# Patient Record
Sex: MALE | Race: WHITE | Employment: OTHER | URBAN - METROPOLITAN AREA
[De-identification: names, ages, dates, MRNs, and addresses within clinical notes are randomized per-mention and may not be internally consistent; named-entity substitution may affect disease eponyms.]

---

## 2017-03-14 DIAGNOSIS — E88.81 METABOLIC SYNDROME X: ICD-10-CM

## 2017-03-14 DIAGNOSIS — I10 ESSENTIAL HYPERTENSION: ICD-10-CM

## 2017-03-14 DIAGNOSIS — E78.5 HYPERLIPIDEMIA WITH TARGET LDL LESS THAN 100: ICD-10-CM

## 2017-03-14 RX ORDER — ATENOLOL 50 MG/1
50 TABLET ORAL DAILY
Qty: 90 TABLET | Refills: 1 | Status: SHIPPED | OUTPATIENT
Start: 2017-03-14 | End: 2017-09-11 | Stop reason: SDUPTHER

## 2017-03-14 RX ORDER — BENAZEPRIL HYDROCHLORIDE 40 MG/1
TABLET, FILM COATED ORAL
Qty: 90 TABLET | Refills: 0 | Status: SHIPPED | OUTPATIENT
Start: 2017-03-14 | End: 2017-06-21 | Stop reason: SDUPTHER

## 2017-03-14 RX ORDER — PRAVASTATIN SODIUM 20 MG
20 TABLET ORAL DAILY
Qty: 90 TABLET | Refills: 1 | Status: SHIPPED | OUTPATIENT
Start: 2017-03-14 | End: 2018-02-19 | Stop reason: SDUPTHER

## 2017-03-17 ENCOUNTER — OFFICE VISIT (OUTPATIENT)
Dept: FAMILY MEDICINE CLINIC | Age: 57
End: 2017-03-17

## 2017-03-17 VITALS
OXYGEN SATURATION: 96 % | DIASTOLIC BLOOD PRESSURE: 76 MMHG | RESPIRATION RATE: 18 BRPM | SYSTOLIC BLOOD PRESSURE: 118 MMHG | HEIGHT: 69 IN | WEIGHT: 315 LBS | TEMPERATURE: 98.5 F | HEART RATE: 68 BPM | BODY MASS INDEX: 46.65 KG/M2

## 2017-03-17 DIAGNOSIS — Z11.4 SCREENING FOR HIV (HUMAN IMMUNODEFICIENCY VIRUS): ICD-10-CM

## 2017-03-17 DIAGNOSIS — E78.5 HYPERLIPIDEMIA WITH TARGET LDL LESS THAN 100: ICD-10-CM

## 2017-03-17 DIAGNOSIS — E88.81 METABOLIC SYNDROME X: ICD-10-CM

## 2017-03-17 DIAGNOSIS — Z23 NEED FOR PNEUMOCOCCAL VACCINATION: ICD-10-CM

## 2017-03-17 DIAGNOSIS — Z11.59 NEED FOR HEPATITIS C SCREENING TEST: ICD-10-CM

## 2017-03-17 DIAGNOSIS — I10 ESSENTIAL HYPERTENSION: ICD-10-CM

## 2017-03-17 DIAGNOSIS — R45.86 MOOD SWINGS: ICD-10-CM

## 2017-03-17 DIAGNOSIS — Z12.5 SPECIAL SCREENING FOR MALIGNANT NEOPLASM OF PROSTATE: ICD-10-CM

## 2017-03-17 DIAGNOSIS — Z12.11 SPECIAL SCREENING FOR MALIGNANT NEOPLASMS, COLON: ICD-10-CM

## 2017-03-17 DIAGNOSIS — I10 ESSENTIAL HYPERTENSION: Primary | ICD-10-CM

## 2017-03-17 LAB
A/G RATIO: 1.6 (ref 1.1–2.2)
ALBUMIN SERPL-MCNC: 4.1 G/DL (ref 3.4–5)
ALP BLD-CCNC: 56 U/L (ref 40–129)
ALT SERPL-CCNC: 28 U/L (ref 10–40)
ANION GAP SERPL CALCULATED.3IONS-SCNC: 13 MMOL/L (ref 3–16)
AST SERPL-CCNC: 20 U/L (ref 15–37)
BILIRUB SERPL-MCNC: 0.6 MG/DL (ref 0–1)
BUN BLDV-MCNC: 16 MG/DL (ref 7–20)
CALCIUM SERPL-MCNC: 9.4 MG/DL (ref 8.3–10.6)
CHLORIDE BLD-SCNC: 103 MMOL/L (ref 99–110)
CHOLESTEROL, TOTAL: 165 MG/DL (ref 0–199)
CO2: 23 MMOL/L (ref 21–32)
CREAT SERPL-MCNC: 0.8 MG/DL (ref 0.9–1.3)
ESTIMATED AVERAGE GLUCOSE: 114 MG/DL
GFR AFRICAN AMERICAN: >60
GFR NON-AFRICAN AMERICAN: >60
GLOBULIN: 2.5 G/DL
GLUCOSE BLD-MCNC: 120 MG/DL (ref 70–99)
HBA1C MFR BLD: 5.6 %
HDLC SERPL-MCNC: 41 MG/DL (ref 40–60)
HEPATITIS C ANTIBODY INTERPRETATION: NORMAL
LDL CHOLESTEROL CALCULATED: 90 MG/DL
POTASSIUM SERPL-SCNC: 4.7 MMOL/L (ref 3.5–5.1)
PROSTATE SPECIFIC ANTIGEN: 0.85 NG/ML (ref 0–4)
SODIUM BLD-SCNC: 139 MMOL/L (ref 136–145)
TOTAL PROTEIN: 6.6 G/DL (ref 6.4–8.2)
TRIGL SERPL-MCNC: 168 MG/DL (ref 0–150)
VLDLC SERPL CALC-MCNC: 34 MG/DL

## 2017-03-17 PROCEDURE — 99214 OFFICE O/P EST MOD 30 MIN: CPT | Performed by: FAMILY MEDICINE

## 2017-03-17 PROCEDURE — 90471 IMMUNIZATION ADMIN: CPT | Performed by: FAMILY MEDICINE

## 2017-03-17 PROCEDURE — 90732 PPSV23 VACC 2 YRS+ SUBQ/IM: CPT | Performed by: FAMILY MEDICINE

## 2017-03-20 LAB — HIV-1 AND HIV-2 ANTIBODIES: NORMAL

## 2017-03-24 ENCOUNTER — NURSE ONLY (OUTPATIENT)
Dept: FAMILY MEDICINE CLINIC | Age: 57
End: 2017-03-24

## 2017-03-24 DIAGNOSIS — Z12.11 SPECIAL SCREENING FOR MALIGNANT NEOPLASMS, COLON: ICD-10-CM

## 2017-03-24 LAB
CONTROL: POSITIVE
HEMOCCULT STL QL: NEGATIVE

## 2017-03-24 PROCEDURE — 82274 ASSAY TEST FOR BLOOD FECAL: CPT | Performed by: FAMILY MEDICINE

## 2017-06-21 DIAGNOSIS — I10 ESSENTIAL HYPERTENSION: ICD-10-CM

## 2017-06-21 DIAGNOSIS — E88.81 METABOLIC SYNDROME X: ICD-10-CM

## 2017-06-21 RX ORDER — BENAZEPRIL HYDROCHLORIDE 40 MG/1
TABLET, FILM COATED ORAL
Qty: 90 TABLET | Refills: 0 | Status: SHIPPED | OUTPATIENT
Start: 2017-06-21 | End: 2017-09-11 | Stop reason: SDUPTHER

## 2017-08-10 ENCOUNTER — TELEPHONE (OUTPATIENT)
Dept: FAMILY MEDICINE CLINIC | Age: 57
End: 2017-08-10

## 2017-08-11 ENCOUNTER — OFFICE VISIT (OUTPATIENT)
Dept: FAMILY MEDICINE CLINIC | Age: 57
End: 2017-08-11

## 2017-08-11 VITALS
TEMPERATURE: 98.7 F | DIASTOLIC BLOOD PRESSURE: 72 MMHG | WEIGHT: 315 LBS | HEIGHT: 69 IN | SYSTOLIC BLOOD PRESSURE: 124 MMHG | RESPIRATION RATE: 20 BRPM | HEART RATE: 58 BPM | BODY MASS INDEX: 46.65 KG/M2

## 2017-08-11 DIAGNOSIS — I10 ESSENTIAL HYPERTENSION: Primary | ICD-10-CM

## 2017-08-11 DIAGNOSIS — E78.5 HYPERLIPIDEMIA WITH TARGET LDL LESS THAN 100: ICD-10-CM

## 2017-08-11 DIAGNOSIS — R45.4 OUTBURSTS OF ANGER: ICD-10-CM

## 2017-08-11 DIAGNOSIS — F51.01 PRIMARY INSOMNIA: ICD-10-CM

## 2017-08-11 DIAGNOSIS — M48.061 SPINAL STENOSIS OF LUMBAR REGION: ICD-10-CM

## 2017-08-11 DIAGNOSIS — E88.81 METABOLIC SYNDROME X: ICD-10-CM

## 2017-08-11 DIAGNOSIS — G47.33 OBSTRUCTIVE SLEEP APNEA SYNDROME: ICD-10-CM

## 2017-08-11 DIAGNOSIS — R45.86 MOOD SWINGS: ICD-10-CM

## 2017-08-11 LAB — HBA1C MFR BLD: 6.2 %

## 2017-08-11 PROCEDURE — 99214 OFFICE O/P EST MOD 30 MIN: CPT | Performed by: FAMILY MEDICINE

## 2017-08-11 PROCEDURE — 83036 HEMOGLOBIN GLYCOSYLATED A1C: CPT | Performed by: FAMILY MEDICINE

## 2017-08-11 RX ORDER — BUSPIRONE HYDROCHLORIDE 15 MG/1
TABLET ORAL
Qty: 60 TABLET | Refills: 1 | Status: SHIPPED | OUTPATIENT
Start: 2017-08-11 | End: 2018-08-16

## 2017-08-11 RX ORDER — AMITRIPTYLINE HYDROCHLORIDE 50 MG/1
50 TABLET, FILM COATED ORAL NIGHTLY
Qty: 30 TABLET | Refills: 3 | Status: SHIPPED | OUTPATIENT
Start: 2017-08-11 | End: 2017-11-17

## 2017-09-11 DIAGNOSIS — I10 ESSENTIAL HYPERTENSION: ICD-10-CM

## 2017-09-11 DIAGNOSIS — E88.81 METABOLIC SYNDROME X: ICD-10-CM

## 2017-09-11 RX ORDER — BENAZEPRIL HYDROCHLORIDE 40 MG/1
TABLET, FILM COATED ORAL
Qty: 90 TABLET | Refills: 0 | Status: SHIPPED | OUTPATIENT
Start: 2017-09-11 | End: 2017-11-17 | Stop reason: SDUPTHER

## 2017-09-11 RX ORDER — ATENOLOL 50 MG/1
50 TABLET ORAL DAILY
Qty: 90 TABLET | Refills: 1 | Status: SHIPPED | OUTPATIENT
Start: 2017-09-11 | End: 2017-09-18

## 2017-09-18 ENCOUNTER — TELEPHONE (OUTPATIENT)
Dept: FAMILY MEDICINE CLINIC | Age: 57
End: 2017-09-18

## 2017-09-18 RX ORDER — METOPROLOL SUCCINATE 50 MG/1
50 TABLET, EXTENDED RELEASE ORAL DAILY
Qty: 90 TABLET | Refills: 1 | Status: SHIPPED | OUTPATIENT
Start: 2017-09-18 | End: 2018-03-09 | Stop reason: SDUPTHER

## 2017-10-02 ENCOUNTER — OFFICE VISIT (OUTPATIENT)
Dept: FAMILY MEDICINE CLINIC | Age: 57
End: 2017-10-02

## 2017-10-02 VITALS
SYSTOLIC BLOOD PRESSURE: 120 MMHG | HEIGHT: 69 IN | BODY MASS INDEX: 46.65 KG/M2 | DIASTOLIC BLOOD PRESSURE: 70 MMHG | RESPIRATION RATE: 18 BRPM | TEMPERATURE: 98.3 F | HEART RATE: 62 BPM | WEIGHT: 315 LBS

## 2017-10-02 DIAGNOSIS — G89.29 CHRONIC NONINTRACTABLE HEADACHE, UNSPECIFIED HEADACHE TYPE: ICD-10-CM

## 2017-10-02 DIAGNOSIS — R51.9 CHRONIC NONINTRACTABLE HEADACHE, UNSPECIFIED HEADACHE TYPE: ICD-10-CM

## 2017-10-02 DIAGNOSIS — R45.86 MOOD SWINGS: Primary | ICD-10-CM

## 2017-10-02 DIAGNOSIS — J30.9 ALLERGIC RHINITIS, UNSPECIFIED ALLERGIC RHINITIS TRIGGER, UNSPECIFIED RHINITIS SEASONALITY: ICD-10-CM

## 2017-10-02 PROCEDURE — 99214 OFFICE O/P EST MOD 30 MIN: CPT | Performed by: FAMILY MEDICINE

## 2017-10-02 RX ORDER — MONTELUKAST SODIUM 10 MG/1
10 TABLET ORAL NIGHTLY
Qty: 30 TABLET | Refills: 3 | Status: SHIPPED | OUTPATIENT
Start: 2017-10-02 | End: 2019-06-20

## 2017-10-02 RX ORDER — DULOXETIN HYDROCHLORIDE 30 MG/1
CAPSULE, DELAYED RELEASE ORAL
Qty: 30 CAPSULE | Refills: 0 | Status: SHIPPED | OUTPATIENT
Start: 2017-10-02 | End: 2017-11-17

## 2017-10-02 RX ORDER — DULOXETIN HYDROCHLORIDE 60 MG/1
60 CAPSULE, DELAYED RELEASE ORAL DAILY
Qty: 30 CAPSULE | Refills: 3 | Status: SHIPPED | OUTPATIENT
Start: 2017-10-02 | End: 2018-03-09 | Stop reason: SDUPTHER

## 2017-10-02 RX ORDER — FLUTICASONE PROPIONATE 50 MCG
1 SPRAY, SUSPENSION (ML) NASAL DAILY
Qty: 1 BOTTLE | Refills: 3 | Status: SHIPPED | OUTPATIENT
Start: 2017-10-02 | End: 2021-03-16

## 2017-10-02 ASSESSMENT — PATIENT HEALTH QUESTIONNAIRE - PHQ9
2. FEELING DOWN, DEPRESSED OR HOPELESS: 0
SUM OF ALL RESPONSES TO PHQ QUESTIONS 1-9: 0
1. LITTLE INTEREST OR PLEASURE IN DOING THINGS: 0
SUM OF ALL RESPONSES TO PHQ9 QUESTIONS 1 & 2: 0

## 2017-10-02 NOTE — PROGRESS NOTES
CHART REVIEW  Health Maintenance   Topic Date Due    Flu vaccine (1) 09/01/2017    Colon Cancer Screen FIT/FOBT  03/24/2018    Diabetes screen  08/11/2020    Lipid screen  03/17/2022    DTaP/Tdap/Td vaccine (4 - Td) 09/01/2025    Hepatitis C screen  Completed    HIV screen  Completed     Social History     Social History    Marital status:      Spouse name: N/A    Number of children: N/A    Years of education: N/A     Occupational History          Social History Main Topics    Smoking status: Never Smoker    Smokeless tobacco: Never Used      Comment: Advised to not start    Alcohol use 0.0 oz/week     0 Standard drinks or equivalent per week      Comment: 3 beers/9 weeks    Drug use: No    Sexual activity: Not Asked     Other Topics Concern    None     Social History Narrative    /deliveries    Lives alone. Exercise: physical job loading trucks     Seatbelt use: Always    Living will: no,   additional information provided                 Prior to Visit Medications    Medication Sig Taking? Authorizing Provider   montelukast (SINGULAIR) 10 MG tablet Take 1 tablet by mouth nightly Yes Vanesa Rodriguez MD   fluticasone (FLONASE) 50 MCG/ACT nasal spray 1 spray by Nasal route daily Yes Vanesa Rodriguez MD   DULoxetine (CYMBALTA) 30 MG extended release capsule Take 1 daily for 7 days then take 2 daily until gone. Then start the 60 mg.  Yes Vanesa Rodriguez MD   DULoxetine (CYMBALTA) 60 MG extended release capsule Take 1 capsule by mouth daily Yes Vanesa Rodriguez MD   metoprolol succinate (TOPROL XL) 50 MG extended release tablet Take 1 tablet by mouth daily Yes Vanesa Rodriguez MD   metFORMIN (GLUCOPHAGE) 1000 MG tablet Take 2 tablets by mouth daily (with breakfast) Yes Vanesa Rodriguez MD   benazepril (LOTENSIN) 40 MG tablet TAKE ONE TABLET BY MOUTH ONCE DAILY Yes Vanesa Rodriguez MD   busPIRone (BUSPAR) 15 MG tablet Take 1/3 tab BID for 5 days, then 1/2 tab BID for 5 days, then 2/3 tab BID for 5 days, then 1 po BID. Yes Maribel Azul MD   amitriptyline (ELAVIL) 50 MG tablet Take 1 tablet by mouth nightly Yes Maribel Azul MD   pravastatin (PRAVACHOL) 20 MG tablet Take 1 tablet by mouth daily Evening preferred Yes Maribel Azul MD   etodolac (LODINE) 400 MG tablet TAKE ONE TABLET BY MOUTH TWICE DAILY AS NEEDED Yes Maribel Azul MD   omeprazole (PRILOSEC) 20 MG capsule Take 1 capsule by mouth daily Yes Maribel Azul MD   albuterol sulfate  (90 BASE) MCG/ACT inhaler Inhale 2 puffs into the lungs every 4 hours as needed for Wheezing May substitute for insurance preferred (Ventolin, Proventil, ProAir) Yes Maribel Azul MD   aspirin 81 MG tablet Take 81 mg by mouth daily. Yes Historical Provider, MD   multivitamin [facility]) per tablet Take 1 tablet by mouth daily. Yes Historical Provider, MD   Garlic (ODORLESS GARLIC) 3816 MG TABS Take 1 tablet by mouth daily.    Yes Historical Provider, MD      Patient Active Problem List   Diagnosis    Special screening for malignant neoplasms, colon    Special screening for malignant neoplasm of prostate    Plantar fasciitis    Lumbosacral radiculopathy-L5 by EMG, Epidural spring 2011    Spinal stenosis    HTN (hypertension)    Metabolic syndrome X    Hyperlipidemia with target LDL less than 100    Seborrheic dermatitis    Mood swings (HCC)    Insomnia    Obesity    Foot drop    Tinnitus    History of nephrolithiasis    Allergic rhinitis    Needs flu shot    Well adult health check    Chronic headache    Obstructive sleep apnea syndrome    Outbursts of anger    Primary insomnia      Cholesterol, Total   Date Value Ref Range Status   03/17/2017 165 0 - 199 mg/dL Final     LDL Calculated   Date Value Ref Range Status   03/17/2017 90 <100 mg/dL Final     HDL   Date Value Ref Range Status   03/17/2017 41 40 - 60 mg/dL Final   05/21/2012 41 40 - 60 mg/dl Final     Triglycerides   Date Value Ref Range Status   03/17/2017 168 (H) 0 - 150 mg/dL Final     Lab Results   Component Value Date    GLUCOSE 120 (H) 03/17/2017     Lab Results   Component Value Date     03/17/2017    K 4.7 03/17/2017    CREATININE 0.8 (L) 03/17/2017     Lab Results   Component Value Date    WBC 9.2 07/03/2014    HGB 15.7 07/03/2014    HCT 46.1 07/03/2014    MCV 95.2 07/03/2014     07/03/2014     Lab Results   Component Value Date    ALT 28 03/17/2017    AST 20 03/17/2017    ALKPHOS 56 03/17/2017    BILITOT 0.6 03/17/2017     No results found for: TSH  Lab Results   Component Value Date    LABA1C 6.2 08/11/2017     VISIT NOTE  Subjective:     Chief Complaint   Patient presents with    Check-Up     6 week mood check up       Frantz Hernandez is a 59 y.o. male who presents for follow up of mood issue and sinus    6 weeks of buspar. No change in irritability, feeling more anxious. Sleep better    HISTORY  Are you working with a psychologist / psychiatrist?  No  Have you felt your symptoms are better, worse, or unchanged since your last visit   worse  Mood is fair. Sleep is good. Patient denies depression symptoms of: recurrent thoughts of death and suicidal intention  Patient denies anxiety symptoms of: feelings of losing control, losing control. Sinus act up off on x yrs. Frontal and pressure behind nose, ears plugged. Nasal drainage constant but clear. Occasional sneeze. Has failed zyrtec and flonase no help. Not related to eating. Review of Systems  Pertinent items are noted in HPI. Patient's medications, allergies, past medical, surgical, social and family histories were reviewed and updated as appropriate. Objective:   PHYSICAL EXAM  /70 (Site: Right Arm, Position: Sitting, Cuff Size: Large Adult)  Pulse 62  Temp 98.3 °F (36.8 °C) (Oral)   Resp 18  Ht 5' 9\" (1.753 m)  Wt (!) 358 lb (162.4 kg)  BMI 52.87 kg/u0Naefoj is increased. Blood pressure is Excellent.   BP Readings from Last 3 Encounters:   10/02/17 120/70   08/11/17 124/72 patient questions answered. Pt voiced understanding. INSTRUCTIONS  NEXT APPOINTMENT: Please schedule check-up in 2 months. · PLEASE TAKE THIS FORM TO CHECK-OUT WINDOW TO SCHEDULE NEXT VISIT.   · REFILL POLICY:  If not getting refills today, then PLEASE make next appointment on way out today. Will need to see that future appointment scheudled when pharamcy contacts Dr. Herve Smith for a refill. · Return sooner if having any problems. · STOP sudafed  · Restart Flonase  · Add singulair  · See allergist  · Continue zyrtec  · Taper off buspar half tab twice a day or 3 days then stop. · Start cymbalta- 30 mg daily for 7 days then 60 mg daily.

## 2017-10-02 NOTE — LETTER
For questions regarding your [employer] account call [phone]. If you have a clinical question, please call your doctor's office.     Sincerely,  Pablo Bolden MD

## 2017-10-02 NOTE — PATIENT INSTRUCTIONS
INSTRUCTIONS  NEXT APPOINTMENT: Please schedule check-up in 2 months. · PLEASE TAKE THIS FORM TO CHECK-OUT WINDOW TO SCHEDULE NEXT VISIT.   · REFILL POLICY:  If not getting refills today, then PLEASE make next appointment on way out today. Will need to see that future appointment scheudled when pharamcy contacts Dr. Jorge De La Fuente for a refill. · Return sooner if having any problems. · STOP sudafed  · Restart Flonase  · Add singulair  · See allergist  · Continue zyrtec  · Taper off buspar half tab twice a day or 3 days then stop. · Start cymbalta- 30 mg daily for 7 days then 60 mg daily. ALLERGIC RHINITIS    Overview   What is an allergy? You have an allergy when your body overreacts to things that don't cause problems for most people. These things are called allergens. Your body's overreaction to the allergens is what causes symptoms. What is hay fever? Hay fever, also known as allergic rhinitis (say: rine-EYE-tis), is an allergic reaction to pollen. Symptoms of hay fever are seasonal, meaning you will feel worse when the pollens that affect you are at their highest levels. Hay fever is the most common form of allergy. It affects 1 in 5 people. Symptoms   What are common allergy symptoms? Symptoms vary depending on the severity of your allergies. Symptoms can include:  Sneezing   Coughing   Itching (mostly eyes, nose, mouth, throat and skin)   Runny nose   Stuffy nose   Headache   Pressure in the nose and cheeks   Ear fullness and popping   Sore throat   Watery, red or swollen eyes   Dark circles under your eyes   Trouble smelling   Hives     How does hay fever differ from a cold or the flu? Hay fever lasts longer than a cold or the fluup to several weeksand does not cause fever. The nasal discharge from hay fever is thin, watery and clear, while nasal discharge from a cold or the flu tends to be thicker.  Itching (mostly eyes, nose, mouth, throat and skin) is common with hay fever but not with a cold or the flu. Sneezing is more prominent with hay fever and can occur in rather violent bouts. When should I see a doctor? If your symptoms interfere with your life, consider seeing your family doctor. Your doctor will probably do a physical exam and ask you questions about your symptoms. Keeping a record of your symptoms over a period of time can help your doctor determine what triggers your allergies. Causes & Risk Factors   What causes allergies? You have an allergy when your body overreacts to things that don't cause problems for most people. These things are called allergens. If you have allergies, when you are exposed to an allergen, your body releases chemicals. One type of chemical that your body releases is called histamine. Histamine is your bodys defense against the allergen. The release of histamine causes your symptoms. What are the most common allergens? Pollen from trees, grass and weeds. Allergies that occur in the spring (late April and May) are often due to tree pollen. Allergies that occur in the summer (late May to mid-July) are often due to grass and weed pollen. Allergies that occur in the fall (late August to the first frost) are often due to ragweed. If you are allergic to pollen, you will notice your symptoms are worse on hot, dry days when wind carries the pollen. On rainy days, pollen often is washed to the ground, which means you are less likely to breathe it. Mold. Mold is common where water tends to collect, such as shower curtains, window moldings and damp basements. It can also be found in rotting logs, hay, mulches, commercial peat bonilla, compost piles and leaf litter. This allergy is usually worse during humid and rainy weather. Animal dander. Proteins found in the skin, saliva, and urine of furry pets such as cats and dogs are allergens. You can be exposed to dander when handling an animal or from house dust that contains dander.     Dust. Many allergens, Cover mattress and pillows with plastic covers. Lower the humidity in your home using a dehumidifier. Things that can make your allergy symptoms worse  Aerosol sprays   Air pollution   Cold temperatures   Humidity   Irritating fumes   Tobacco smoke   Wind   Wood smoke               EUSTACHIAN TUBE DYSFUNCTION    Overview   What is eustachian tube dysfunction? The eustachian tubes are small passageways that connect the upper part of your throat (pharynx) to your middle ears. When you sneeze, swallow or yawn, your eustachian tubes open, allowing air to flow in and out. But sometimes a eustachian tube might get plugged. This is called eustachian tube dysfunction. When this happens, sounds may be muffled and your ear may feel full, or you may have ear pain. Anatomy of the ear  Symptoms   What are symptoms of eustachian tube dysfunction? If you have eustachian tube dysfunction:   Your ears may feel plugged or full. Sounds may seem muffled. You may feel a popping or clicking sensation (children may say their ear tickles). You may have pain in one or both ears. You may hear ringing in your ears (called tinnitus). You may sometimes have trouble keeping your balance. Your symptoms may get worse when you are flying (because of altitude changes). Riding in [employer], driving through [address] [place] [address] or diving may also make your symptoms worse. Causes & Risk Factors   What causes eustachian tube dysfunction? The most common cause of eustachian tube dysfunction is excessive mucus and inflammation of the tube caused by a cold, the flu, a sinus infection or allergies. Who is at risk for eustachian tube dysfunction? Children are at greater risk of eustachian tube dysfunction because their tubes are shorter and straighter than those of an adult. This makes it easier for germs to reach the middle ear and for fluid to become trapped there.  Also, childrens immune systems are not fully developed, so its harder

## 2017-10-02 NOTE — MR AVS SNAPSHOT
After Visit Summary             Frantz Hernandez   10/2/2017 8:00 AM   Office Visit    Description:  Male : 1960   Provider:  Chloé Soriano MD   Department:  [address] [place] [address] and Future Appointments         Below is a list of your follow-up and future appointments. This may not be a complete list as you may have made appointments directly with providers that we are not aware of or your providers may have made some for you. Please call your providers to confirm appointments. It is important to keep your appointments. Please bring your current insurance card, photo ID, co-pay, and all medication bottles to your appointment. If self-pay, payment is expected at the time of service. Your To-Do List     Follow-Up    Return in about 6 weeks (around 2017) for Diabetes, Mood. Information from Your Visit        Department     Name Address Phone Fax    [address]  [facility]. [address] [place] [address] [phone]      You Were Seen for:         Comments    Mood swings [facility])   [[id number]]         Vital Signs     Blood Pressure Pulse Temperature Respirations Height Weight    120/70 (Site: Right Arm, Position: Sitting, Cuff Size: Large Adult) 62 98.3 °F (36.8 °C) (Oral) 18 5' 9\" (1.753 m) 358 lb (162.4 kg)    Body Mass Index Smoking Status                52.87 kg/m2 Never Smoker          Additional Information about your Body Mass Index (BMI)           Your BMI as listed above is considered obese (30 or more). BMI is an estimate of body fat, calculated from your height and weight. The higher your BMI, the greater your risk of heart disease, high blood pressure, type 2 diabetes, stroke, gallstones, arthritis, sleep apnea, and certain cancers. BMI is not perfect. It may overestimate body fat in athletes and people who are more muscular.   Even a small weight loss (between 5 and 10 percent of your current weight) by decreasing your calorie intake and becoming more physically active will help lower your risk of developing or worsening diseases associated with obesity. Learn more at: Spire.co.uk          Instructions    INSTRUCTIONS  NEXT APPOINTMENT: Please schedule check-up in 2 months. · PLEASE TAKE THIS FORM TO CHECK-OUT WINDOW TO SCHEDULE NEXT VISIT.   · REFILL POLICY:  If not getting refills today, then PLEASE make next appointment on way out today. Will need to see that future appointment scheudled when phadebbie contacts Dr. Eduardo August for a refill. · Return sooner if having any problems. · STOP sudafed  · Restart Flonase  · Add singulair  · See allergist  · Continue zyrtec  · Taper off buspar half tab twice a day or 3 days then stop. · Start cymbalta- 30 mg daily for 7 days then 60 mg daily. ALLERGIC RHINITIS    Overview   What is an allergy? You have an allergy when your body overreacts to things that don't cause problems for most people. These things are called allergens. Your body's overreaction to the allergens is what causes symptoms. What is hay fever? Hay fever, also known as allergic rhinitis (say: rine-EYE-tis), is an allergic reaction to pollen. Symptoms of hay fever are seasonal, meaning you will feel worse when the pollens that affect you are at their highest levels. Hay fever is the most common form of allergy. It affects 1 in 5 people. Symptoms   What are common allergy symptoms? Symptoms vary depending on the severity of your allergies. Symptoms can include:  Sneezing   Coughing   Itching (mostly eyes, nose, mouth, throat and skin)   Runny nose   Stuffy nose   Headache   Pressure in the nose and cheeks   Ear fullness and popping   Sore throat   Watery, red or swollen eyes   Dark circles under your eyes   Trouble smelling   Hives     How does hay fever differ from a cold or the flu? rotting logs, hay, mulches, commercial peat bonilla, compost piles and leaf litter. This allergy is usually worse during humid and rainy weather. Animal dander. Proteins found in the skin, saliva, and urine of furry pets such as cats and dogs are allergens. You can be exposed to dander when handling an animal or from house dust that contains dander. Dust. Many allergens, including dust mites, are in dust. Dust mites are tiny living creatures found in bedding, mattresses, carpeting and upholstered furniture. They live on dead skin cells and other things found in house dust.    Diagnosis & Tests   How do I know what Im allergic to? Your doctor will do a physical exam and will ask you about your symptoms and when they occur. Your doctor may also want to do an allergy skin test to help determine exactly what is causing your allergy. An allergy skin test puts tiny amounts of allergens onto your skin to see which ones you react to. Once you know which allergens you are allergic to, you and your doctor can decide the best treatment. Your doctor may also decide to do a blood test, such as the radioallergosorbent test (called RAST). Treatment   How are allergies treated? Several medicines can be used to treat allergies. Your doctor will help you determine what medicine is best for you depending on your symptoms, age and overall health. These medicines are more useful if you use them before you're exposed to allergens. Antihistamines help reduce the sneezing, runny nose and itchiness of allergies. They're more useful if you use them before you're exposed to allergens. Some antihistamines come in pill form (some brand names: Zyrtec, Claritin) and some are nasal sprays (one brand name: Astelin). Some antihistamines can cause drowsiness and dry mouth. Others are less likely to cause these side effects, but some of these require a prescription. Ask your doctor which kind is best for you. Decongestants, such as pseudoephedrine and phenylephrine, help temporarily relieve the stuffy nose of allergies. Decongestants are found in many medicines and come as pills, nose sprays and nose drops (some brand names: Sudafed, Afrin, Sinex). They are best used only for a short time. Nose sprays and drops shouldn't be used for more than 3 days because you can become dependent on them. This causes you to feel even more stopped-up when you try to quit using them. You can buy decongestants without a doctor's prescription. However, decongestants can raise your blood pressure, so it's a good idea to talk to your family doctor before using them, especially if you have high blood pressure. Cromolyn sodium is a nasal spray (one brand name: NasalCrom) that helps prevent the body's reaction to allergens. Cromolyn sodium is more helpful if you use it before you're exposed to allergens. This medicine may take 2 to 4 weeks to start working. It is available without a prescription. Nasal steroid sprays reduce the reaction of the nasal tissues to inhaled allergens. This helps relieve the swelling in your nose so that you feel less stopped-up. They are the most effective at treating patients who have chronic symptoms Nasal steroid sprays are available with a prescription from your doctor. You won't notice their benefits for up to 2 weeks after starting them. Eye drops. If your other medicines are not helping enough with your itchy, watery eyes, your doctor may prescribe eye drops for you. Allergy shots (also called immunotherapy) are an option for people who try other treatments but still have allergy symptoms. These shots contain a very small amount of the allergen you are allergic to. They're given on a regular schedule so that your body gets used to the allergens and no longer overreacts to them. This helps decrease your bodys sensitivity to the allergen. Over time, your allergy symptoms will become less severe. Allergy shots are only used when the allergens you're sensitive to can be identified and when you can't avoid them. It takes a few months to years to finish treatment, and you may need to have treatments throughout your life. Prevention   How can I avoid allergens? Pollens. Shower or bathe before bedtime to wash off pollen and other allergens in your hair and on your skin. Avoid going outside, especially on dry, windy days. Keep windows and doors shut, and use an air conditioner at home and in your car. Mold. You can reduce the amount of mold in your home by removing houseplants and by frequently cleaning shower curtains, bathroom windows, damp walls, areas with dry rot and indoor trash cans. Use a mix of water and chlorine bleach to kill mold. Open doors and windows and use fans to increase air movement and help prevent mold. Don't carpet bathrooms or other damp rooms and use mold-proof paint instead of wallpaper. Reducing the humidity in your home to 50% or less can also help. You can control your home air quality by using a dehumidifier, keeping the temperature set at 70 degrees, and cleaning or replacing small-particle filters in your central air system. Pet dander. If your allergies are severe, you may need to give your pets away or at least keep them outside. Cat or dog dander often collects in house dust and takes 4 weeks or more to die down. However, there are ways to reduce the amounts of pet dander in your home. Using allergen-resistant bedding, bathing your pet frequently, and using an air filter can help reduce pet dander. Ask your  for other ways to reduce pet dander in your home. Dust and dust mites. To reduce dust mites in your home, remove drapes, feather pillows, upholstered furniture, non-washable comforters and soft toys. Replace carpets with linoleum or wood. Polished floors are best. Mop the floor often with a damp mop and wipe surfaces with a damp cloth. and inflammation of the tube caused by a cold, the flu, a sinus infection or allergies. Who is at risk for eustachian tube dysfunction? Children are at greater risk of eustachian tube dysfunction because their tubes are shorter and straighter than those of an adult. This makes it easier for germs to reach the middle ear and for fluid to become trapped there. Also, childrens immune systems are not fully developed, so its harder for them to fight off infections. Smoking and obesity are also risk factors. Smoking damages the cilia (the tiny hairs that sweep mucus from the middle ear to the back of the nose). This can allow mucus to gather in the tubes. In people who are obese, fatty deposits around the tubes can also lead to eustachian tube dysfunction. Diagnosis & Tests   How will my doctor know I have eustachian tube dysfunction? Your doctor will be able to tell if you have eustachian tube dysfunction by talking to you about your symptoms and by examining you. Your doctor will examine your ear canals and eardrums, and your nasal passages and the back of your throat. Treatment   How is eustachian tube dysfunction treated? Symptoms of eustachian tube dysfunction usually go away without treatment. Eustachian tube exercises, such as swallowing, yawning or chewing gum, can help open the eustachian tubes. You can help relieve the full ear feeling by taking a deep breath and blowing with your mouth shut and your nostrils pinched closed. If you think your baby may have eustachian tube dysfunction, give him or her a bottle or a pacifier to encourage the swallow reflex. If these strategies dont help, your doctor may suggest other options. These can include:  Using a decongestant to reduce the swelling of the lining of the tubes. Taking an antihistamine or using a steroid nasal spray to reduce the allergic response if allergies are a factor. [facility]     Phone:  [phone]     DULoxetine 30 MG extended release capsule    DULoxetine 60 MG extended release capsule    fluticasone 50 MCG/ACT nasal spray    montelukast 10 MG tablet               Your Current Medications Are              montelukast (SINGULAIR) 10 MG tablet Take 1 tablet by mouth nightly    fluticasone (FLONASE) 50 MCG/ACT nasal spray 1 spray by Nasal route daily    DULoxetine (CYMBALTA) 30 MG extended release capsule Take 1 daily for 7 days then take 2 daily until gone. Then start the 60 mg. DULoxetine (CYMBALTA) 60 MG extended release capsule Take 1 capsule by mouth daily    metoprolol succinate (TOPROL XL) 50 MG extended release tablet Take 1 tablet by mouth daily    metFORMIN (GLUCOPHAGE) 1000 MG tablet Take 2 tablets by mouth daily (with breakfast)    benazepril (LOTENSIN) 40 MG tablet TAKE ONE TABLET BY MOUTH ONCE DAILY    busPIRone (BUSPAR) 15 MG tablet Take 1/3 tab BID for 5 days, then 1/2 tab BID for 5 days, then 2/3 tab BID for 5 days, then 1 po BID.    amitriptyline (ELAVIL) 50 MG tablet Take 1 tablet by mouth nightly    pravastatin (PRAVACHOL) 20 MG tablet Take 1 tablet by mouth daily Evening preferred    etodolac (LODINE) 400 MG tablet TAKE ONE TABLET BY MOUTH TWICE DAILY AS NEEDED    omeprazole (PRILOSEC) 20 MG capsule Take 1 capsule by mouth daily    albuterol sulfate  (90 BASE) MCG/ACT inhaler Inhale 2 puffs into the lungs every 4 hours as needed for Wheezing May substitute for insurance preferred (Ventolin, Proventil, ProAir)    aspirin 81 MG tablet Take 81 mg by mouth daily. multivitamin (THERAGRAN) per tablet Take 1 tablet by mouth daily. Garlic (ODORLESS GARLIC) 9900 MG TABS Take 1 tablet by mouth daily.         Allergies              Codeine Nausea And Vomiting, Rash      We Ordered/Performed the following           External Referral To Allergy     Scheduling Instructions:    Gabo Byrd notes, and more. How Do I Sign Up? 1. In your Internet browser, go to [email or web address]  2. Click on the Sign Up Now link in the Sign In box. You will see the New Member Sign Up page. 3. Enter your icanbuyt Access Code exactly as it appears below. You will not need to use this code after youve completed the sign-up process. If you do not sign up before the expiration date, you must request a new code. [employer] Access Code: [email or web address]  Expires: 10/10/2017  3:56 PM    4. Enter your Social Security Number ([id number]) and Date of Birth (mm/dd/yyyy) as indicated and click Submit. You will be taken to the next sign-up page. 5. Create a icanbuyt ID. This will be your Medicalis login ID and cannot be changed, so think of one that is secure and easy to remember. 6. Create a [employer] password. You can change your password at any time. 7. Enter your Password Reset Question and Answer. This can be used at a later time if you forget your password. 8. Enter your e-mail address. You will receive e-mail notification when new information is available in [address]. 9. Click Sign Up. You can now view your medical record. Additional Information  If you have questions, please contact the physician practice where you receive care. Remember, Medicalis is NOT to be used for urgent needs. For medical emergencies, dial 911. For questions regarding your [employer]is account call [phone]. If you have a clinical question, please call your doctor's office.

## 2017-11-17 ENCOUNTER — OFFICE VISIT (OUTPATIENT)
Dept: FAMILY MEDICINE CLINIC | Age: 57
End: 2017-11-17

## 2017-11-17 VITALS
TEMPERATURE: 97.9 F | HEIGHT: 69 IN | HEART RATE: 67 BPM | SYSTOLIC BLOOD PRESSURE: 130 MMHG | RESPIRATION RATE: 16 BRPM | WEIGHT: 315 LBS | BODY MASS INDEX: 46.65 KG/M2 | DIASTOLIC BLOOD PRESSURE: 74 MMHG

## 2017-11-17 DIAGNOSIS — Z23 NEEDS FLU SHOT: ICD-10-CM

## 2017-11-17 DIAGNOSIS — E88.81 METABOLIC SYNDROME X: Primary | ICD-10-CM

## 2017-11-17 DIAGNOSIS — R45.86 MOOD SWINGS: ICD-10-CM

## 2017-11-17 DIAGNOSIS — Z88.7 HISTORY OF INFLUENZA VACCINE ALLERGY: ICD-10-CM

## 2017-11-17 DIAGNOSIS — E88.81 METABOLIC SYNDROME X: ICD-10-CM

## 2017-11-17 DIAGNOSIS — E78.5 HYPERLIPIDEMIA WITH TARGET LDL LESS THAN 100: ICD-10-CM

## 2017-11-17 DIAGNOSIS — T78.40XA ALLERGIC REACTION, INITIAL ENCOUNTER: ICD-10-CM

## 2017-11-17 DIAGNOSIS — I10 ESSENTIAL HYPERTENSION: ICD-10-CM

## 2017-11-17 DIAGNOSIS — F51.01 PRIMARY INSOMNIA: ICD-10-CM

## 2017-11-17 LAB — HBA1C MFR BLD: 6.4 %

## 2017-11-17 PROCEDURE — 99214 OFFICE O/P EST MOD 30 MIN: CPT | Performed by: FAMILY MEDICINE

## 2017-11-17 PROCEDURE — 90630 INFLUENZA, QUADV, 18-64 YRS, ID, PF, MICRO INJ, 0.1ML (FLUZONE QUADV, PF): CPT | Performed by: FAMILY MEDICINE

## 2017-11-17 PROCEDURE — 83036 HEMOGLOBIN GLYCOSYLATED A1C: CPT | Performed by: FAMILY MEDICINE

## 2017-11-17 PROCEDURE — 90471 IMMUNIZATION ADMIN: CPT | Performed by: FAMILY MEDICINE

## 2017-11-17 RX ORDER — AMITRIPTYLINE HYDROCHLORIDE 50 MG/1
TABLET, FILM COATED ORAL
Qty: 30 TABLET | Refills: 3 | Status: SHIPPED | OUTPATIENT
Start: 2017-11-17 | End: 2018-08-16 | Stop reason: SDUPTHER

## 2017-11-17 RX ORDER — BENAZEPRIL HYDROCHLORIDE 40 MG/1
TABLET, FILM COATED ORAL
Qty: 90 TABLET | Refills: 0 | Status: SHIPPED | OUTPATIENT
Start: 2017-11-17 | End: 2018-03-09 | Stop reason: SDUPTHER

## 2017-11-17 RX ORDER — METHYLPREDNISOLONE 4 MG/1
TABLET ORAL
Qty: 1 KIT | Refills: 0 | Status: SHIPPED | OUTPATIENT
Start: 2017-11-17 | End: 2018-08-16

## 2017-11-17 NOTE — PROGRESS NOTES
is an 59 y.o. male who presents for follow up of following chronic problems:   Metabolic syndrome X     Essential hypertension     Mood swings (HCC)     Class 3 obesity due to excess calories with serious comorbidity and body mass index (BMI) of 50.0 to 59.9 in adult [facility])     Hyperlipidemia with target LDL less than 100      · Patient checks sugars 0  time(s) daily. .   · Patent follows diabetic diet? Yes  · Exercise: Active at work  · Taking medicines daily as directed? Yes  · Is the patient reporting any side effects of medications? No  · Patient checks feet daily? Yes  · Tobacco history updated:  reports that he has never smoked. He has never used smokeless tobacco.  · Blood pressure taken correctly and will be repeated if > 130/80  · See Health maintenance list below for last retinal exam and microalbumin. · See med list below for diabetes, blood pressure or OTC meds and whether taking aspirin    Mood really good  Rare inahler use    ROS:    General ROS: negative for - fever or night sweats  Ophthalmic ROS: negative for - blurry vision or decreased vision  Endocrine ROS: negative for - malaise/lethargy or unexpected weight changes  Respiratory ROS: no cough, shortness of breath, or wheezing  Cardiovascular ROS: no chest pain or dyspnea on exertion  Gastrointestinal ROS: no abdominal pain, change in bowel habits, or black or bloody stools  Genito-Urinary ROS: no dysuria, trouble voiding, or hematuria  Neurological ROS: no TIA or stroke symptoms  negative for - numbness/tingling in feet  Dermatological ROS: negative for - rash or sores on feet     HISTORY:  Patient's medications, allergies, past medical, surgical, social and family histories were reviewed and updated as appropriate (See above).        Objective:   PHYSICAL EXAM   /74 (Site: Right Arm, Position: Sitting, Cuff Size: Medium Adult)   Pulse 67   Temp 97.9 °F (36.6 °C) (Oral)   Resp 16   Ht 5' 9\" (1.753 m)   Wt (!) 353 lb (160.1 kg) methylPREDNISolone (MEDROL DOSEPACK) 4 MG tablet     RISK FACTORS AND COUNSELLING  · Behavioral Risks- :\"None identified\". Counseling provided on the following healthy behaviors: N/A. INSTRUCTIONS  NEXT APPOINTMENT: Please schedule annual complete physical (30 minutes) in 6 months. · PLEASE TAKE THIS FORM TO CHECK-OUT WINDOW TO SCHEDULE NEXT VISIT.   · REFILL POLICY:  If not getting refills today, then PLEASE make next appointment on way out today. Will need to see that future appointment scheudled when pharmacy contacts Dr. Eduardo August for a refill. · TAKE MEDROL DOSE PACK. · For allergies, patient may take OTC antihistamines such as Claritin/Allovert/loratidine or Zyrtec/certrizine or Allegra/fexofenadine. If allergies severe, may also take OTC Benadryl/diphenhydramine.   · ICE PACK AS NEEDED  · IF THROAT TIGHTENING, CALL [phone]

## 2018-02-19 DIAGNOSIS — E78.5 HYPERLIPIDEMIA WITH TARGET LDL LESS THAN 100: ICD-10-CM

## 2018-02-19 RX ORDER — PRAVASTATIN SODIUM 20 MG
20 TABLET ORAL DAILY
Qty: 90 TABLET | Refills: 1 | Status: SHIPPED | OUTPATIENT
Start: 2018-02-19 | End: 2018-08-16 | Stop reason: SDUPTHER

## 2018-02-19 NOTE — TELEPHONE ENCOUNTER
Pt is requesting that he get his medicine before he leaves town today. I spoke to the pt and told him that the doctor hs 24-72 hours to refill any medication request.    Please advise. Thanks.

## 2018-03-09 DIAGNOSIS — E88.81 METABOLIC SYNDROME X: ICD-10-CM

## 2018-03-09 DIAGNOSIS — I10 ESSENTIAL HYPERTENSION: ICD-10-CM

## 2018-03-10 RX ORDER — DULOXETIN HYDROCHLORIDE 60 MG/1
60 CAPSULE, DELAYED RELEASE ORAL DAILY
Qty: 30 CAPSULE | Refills: 3 | Status: SHIPPED | OUTPATIENT
Start: 2018-03-10 | End: 2018-08-16

## 2018-03-10 RX ORDER — METOPROLOL SUCCINATE 50 MG/1
50 TABLET, EXTENDED RELEASE ORAL DAILY
Qty: 90 TABLET | Refills: 1 | Status: SHIPPED | OUTPATIENT
Start: 2018-03-10 | End: 2018-08-16 | Stop reason: SDUPTHER

## 2018-03-10 RX ORDER — BENAZEPRIL HYDROCHLORIDE 40 MG/1
TABLET, FILM COATED ORAL
Qty: 90 TABLET | Refills: 0 | Status: SHIPPED | OUTPATIENT
Start: 2018-03-10 | End: 2018-05-24

## 2018-05-24 DIAGNOSIS — I10 ESSENTIAL HYPERTENSION: ICD-10-CM

## 2018-05-24 DIAGNOSIS — E88.81 METABOLIC SYNDROME X: ICD-10-CM

## 2018-05-24 RX ORDER — BENAZEPRIL HYDROCHLORIDE 40 MG/1
TABLET, FILM COATED ORAL
Qty: 90 TABLET | Refills: 0 | Status: SHIPPED | OUTPATIENT
Start: 2018-05-24 | End: 2018-08-16 | Stop reason: SDUPTHER

## 2018-08-16 ENCOUNTER — OFFICE VISIT (OUTPATIENT)
Dept: FAMILY MEDICINE CLINIC | Age: 58
End: 2018-08-16

## 2018-08-16 VITALS
BODY MASS INDEX: 46.65 KG/M2 | RESPIRATION RATE: 18 BRPM | HEART RATE: 64 BPM | TEMPERATURE: 98.2 F | WEIGHT: 315 LBS | HEIGHT: 69 IN | DIASTOLIC BLOOD PRESSURE: 78 MMHG | SYSTOLIC BLOOD PRESSURE: 118 MMHG

## 2018-08-16 DIAGNOSIS — E78.5 HYPERLIPIDEMIA WITH TARGET LDL LESS THAN 100: ICD-10-CM

## 2018-08-16 DIAGNOSIS — I10 ESSENTIAL HYPERTENSION: ICD-10-CM

## 2018-08-16 DIAGNOSIS — E88.81 METABOLIC SYNDROME X: ICD-10-CM

## 2018-08-16 DIAGNOSIS — F51.01 PRIMARY INSOMNIA: ICD-10-CM

## 2018-08-16 DIAGNOSIS — Z12.5 SCREENING PSA (PROSTATE SPECIFIC ANTIGEN): ICD-10-CM

## 2018-08-16 DIAGNOSIS — M25.50 POLYARTHRALGIA: ICD-10-CM

## 2018-08-16 DIAGNOSIS — E88.81 METABOLIC SYNDROME X: Primary | ICD-10-CM

## 2018-08-16 DIAGNOSIS — Z23 NEED FOR SHINGLES VACCINE: ICD-10-CM

## 2018-08-16 LAB
A/G RATIO: 1.4 (ref 1.1–2.2)
ALBUMIN SERPL-MCNC: 4.4 G/DL (ref 3.4–5)
ALP BLD-CCNC: 54 U/L (ref 40–129)
ALT SERPL-CCNC: 29 U/L (ref 10–40)
ANION GAP SERPL CALCULATED.3IONS-SCNC: 18 MMOL/L (ref 3–16)
AST SERPL-CCNC: 25 U/L (ref 15–37)
BILIRUB SERPL-MCNC: 0.7 MG/DL (ref 0–1)
BUN BLDV-MCNC: 17 MG/DL (ref 7–20)
CALCIUM SERPL-MCNC: 9.7 MG/DL (ref 8.3–10.6)
CHLORIDE BLD-SCNC: 102 MMOL/L (ref 99–110)
CHOLESTEROL, TOTAL: 143 MG/DL (ref 0–199)
CO2: 19 MMOL/L (ref 21–32)
CREAT SERPL-MCNC: 0.8 MG/DL (ref 0.8–1.3)
GFR AFRICAN AMERICAN: >60
GFR NON-AFRICAN AMERICAN: >60
GLOBULIN: 3.2 G/DL
GLUCOSE BLD-MCNC: 107 MG/DL (ref 70–99)
HBA1C MFR BLD: 6.1 %
HDLC SERPL-MCNC: 42 MG/DL (ref 40–60)
LDL CHOLESTEROL CALCULATED: 67 MG/DL
POTASSIUM SERPL-SCNC: 4.8 MMOL/L (ref 3.5–5.1)
PROSTATE SPECIFIC ANTIGEN: 0.63 NG/ML (ref 0–4)
SODIUM BLD-SCNC: 139 MMOL/L (ref 136–145)
TOTAL PROTEIN: 7.6 G/DL (ref 6.4–8.2)
TRIGL SERPL-MCNC: 170 MG/DL (ref 0–150)
VLDLC SERPL CALC-MCNC: 34 MG/DL

## 2018-08-16 PROCEDURE — 99214 OFFICE O/P EST MOD 30 MIN: CPT | Performed by: FAMILY MEDICINE

## 2018-08-16 PROCEDURE — 83036 HEMOGLOBIN GLYCOSYLATED A1C: CPT | Performed by: FAMILY MEDICINE

## 2018-08-16 PROCEDURE — 90750 HZV VACC RECOMBINANT IM: CPT | Performed by: FAMILY MEDICINE

## 2018-08-16 PROCEDURE — 90471 IMMUNIZATION ADMIN: CPT | Performed by: FAMILY MEDICINE

## 2018-08-16 RX ORDER — BENAZEPRIL HYDROCHLORIDE 40 MG/1
TABLET, FILM COATED ORAL
Qty: 90 TABLET | Refills: 1 | Status: SHIPPED | OUTPATIENT
Start: 2018-08-16 | End: 2019-03-13 | Stop reason: SDUPTHER

## 2018-08-16 RX ORDER — METOPROLOL SUCCINATE 50 MG/1
50 TABLET, EXTENDED RELEASE ORAL DAILY
Qty: 90 TABLET | Refills: 1 | Status: SHIPPED | OUTPATIENT
Start: 2018-08-16 | End: 2019-03-13 | Stop reason: SDUPTHER

## 2018-08-16 RX ORDER — AMITRIPTYLINE HYDROCHLORIDE 50 MG/1
TABLET, FILM COATED ORAL
Qty: 90 TABLET | Refills: 1 | Status: SHIPPED | OUTPATIENT
Start: 2018-08-16 | End: 2019-03-13 | Stop reason: SDUPTHER

## 2018-08-16 RX ORDER — ETODOLAC 400 MG/1
TABLET, FILM COATED ORAL
Qty: 180 TABLET | Refills: 1 | Status: SHIPPED | OUTPATIENT
Start: 2018-08-16 | End: 2019-06-20 | Stop reason: SDUPTHER

## 2018-08-16 RX ORDER — PRAVASTATIN SODIUM 20 MG
20 TABLET ORAL DAILY
Qty: 90 TABLET | Refills: 1 | Status: SHIPPED | OUTPATIENT
Start: 2018-08-16 | End: 2019-03-13 | Stop reason: SDUPTHER

## 2018-08-16 ASSESSMENT — PATIENT HEALTH QUESTIONNAIRE - PHQ9
2. FEELING DOWN, DEPRESSED OR HOPELESS: 0
SUM OF ALL RESPONSES TO PHQ9 QUESTIONS 1 & 2: 0
1. LITTLE INTEREST OR PLEASURE IN DOING THINGS: 0
SUM OF ALL RESPONSES TO PHQ QUESTIONS 1-9: 0
SUM OF ALL RESPONSES TO PHQ QUESTIONS 1-9: 0

## 2018-08-16 NOTE — LETTER
[address]  [address] [place] [address]  Phone: [phone]  Fax: [phone]    Karon Angulo MD        August 16, 2018     Patient: Susie Hill   YOB: 1960   Date of Visit: 8/16/2018       To Whom It May Concern: It is my medical opinion that Susie Hill has well controlled metabolic syndrome. Lab Results   Component Value Date    LABA1C 6.1 08/16/2018     If you have any questions or concerns, please don't hesitate to call.     Sincerely,         Karon Angulo MD

## 2018-08-16 NOTE — PROGRESS NOTES
DIABETES MELLITUS FOLOW-UP  Scribe documentation   ILydia am scribing for Allyn Metzger MD. Electronically signed by Lydia Perea  on 8/16/2018 at 11:12 AM  MD Attestation: Fanta Fajardo,  personally performed the services described in this documentation, as scribed by the user listed above, and it is both accurate and complete. I agree with the Chief Complaint, ROS, and Past Histories independently gathered by the clinical support staff.       CHART REVIEW  Health Maintenance   Topic Date Due    Shingles Vaccine (1 of 2 - 2 Dose Series) 06/20/2008    Potassium monitoring  03/17/2018    Creatinine monitoring  03/17/2018    Colon Cancer Screen FIT/FOBT  03/24/2018    A1C test (Diabetic or Prediabetic)  11/17/2018    Lipid screen  03/17/2022    DTaP/Tdap/Td vaccine (4 - Td) 09/01/2025    Hepatitis C screen  Completed    HIV screen  Completed      Patient Active Problem List   Diagnosis    Special screening for malignant neoplasm of prostate    Plantar fasciitis    Lumbosacral radiculopathy-L5 by EMG, Epidural spring 2011    Spinal stenosis    HTN (hypertension)    Metabolic syndrome X    Hyperlipidemia with target LDL less than 100    Seborrheic dermatitis    Mood swings (HCC)    Insomnia    Obesity    Foot drop    Tinnitus    History of nephrolithiasis    Allergic rhinitis    Needs flu shot    Well adult health check    Chronic headache    Obstructive sleep apnea syndrome    Outbursts of anger    Primary insomnia    History of influenza vaccine allergy     The 10-year ASCVD risk score (Cassandra Velazquez et al., 2013) is: 8.5%    Values used to calculate the score:      Age: 59 years      Sex: Male      Is Non- : No      Diabetic: No      Tobacco smoker: No      Systolic Blood Pressure: 169 mmHg      Is BP treated: Yes      HDL Cholesterol: 41 mg/dL      Total Cholesterol: 165 mg/dL  Current Outpatient Prescriptions   Medication Sig Dispense Refill    118/78 (Site: Right Arm, Position: Sitting, Cuff Size: Large Adult)   Pulse 64   Temp 98.2 °F (36.8 °C) (Oral)   Resp 18   Ht 5' 9\" (1.753 m)   Wt (!) 342 lb (155.1 kg)   BMI 50.50 kg/m²   Blood pressure is good. BP Readings from Last 5 Encounters:   08/16/18 118/78   11/17/17 130/74   10/02/17 120/70   08/11/17 124/72   03/17/17 118/76     Weight is decreased. Wt Readings from Last 5 Encounters:   08/16/18 (!) 342 lb (155.1 kg)   11/17/17 (!) 353 lb (160.1 kg)   10/02/17 (!) 358 lb (162.4 kg)   08/11/17 (!) 352 lb (159.7 kg)   03/17/17 (!) 358 lb (162.4 kg)      GENERAL:   · well-developed, well-nourished, obese, alert, no distress. EYES:   · External findings: lids and lashes normal and conjunctivae and sclerae normal  LUNGS:    · Breathing unlabored  · clear to auscultation bilaterally and good air movement  CARDIOVASC:   · regular rate and rhythm, S1, S2 normal. No murmur, click, rub or gallop  · Apical impulse normal  · LEGS:  Lower extremity edema: none    SKIN: warm and dry  PSYCH:    · Alert and oriented  · Normal reasoning, insight good  · Facial expressions full, mood appropriate  · No memory disturbance noted  MUSCULOSKEL:    · No significant finger or nail findings  NEURO:   · CN 2-12 intact     Assessment and Plan:      Diagnosis Orders   1. Metabolic syndrome X  Lipid Panel    COMPREHENSIVE METABOLIC PANEL    POCT glycosylated hemoglobin (Hb A1C)    metFORMIN (GLUCOPHAGE) 1000 MG tablet   2. Essential hypertension  Lipid Panel    COMPREHENSIVE METABOLIC PANEL    benazepril (LOTENSIN) 40 MG tablet    metoprolol succinate (TOPROL XL) 50 MG extended release tablet   3. Screening PSA (prostate specific antigen)  Psa screening   4. Need for shingles vaccine  Zoster recombinant [facility])   5. Hyperlipidemia with target LDL less than 100  pravastatin (PRAVACHOL) 20 MG tablet   6. Primary insomnia  amitriptyline (ELAVIL) 50 MG tablet   7.  Polyarthralgia  etodolac (LODINE) 400 MG tablet   RISK

## 2019-03-13 DIAGNOSIS — I10 ESSENTIAL HYPERTENSION: ICD-10-CM

## 2019-03-13 DIAGNOSIS — E78.5 HYPERLIPIDEMIA WITH TARGET LDL LESS THAN 100: ICD-10-CM

## 2019-03-13 DIAGNOSIS — E88.81 METABOLIC SYNDROME X: ICD-10-CM

## 2019-03-13 DIAGNOSIS — F51.01 PRIMARY INSOMNIA: ICD-10-CM

## 2019-03-13 RX ORDER — BENAZEPRIL HYDROCHLORIDE 40 MG/1
TABLET, FILM COATED ORAL
Qty: 90 TABLET | Refills: 1 | Status: SHIPPED | OUTPATIENT
Start: 2019-03-13 | End: 2019-03-14 | Stop reason: SDUPTHER

## 2019-03-13 RX ORDER — METOPROLOL SUCCINATE 50 MG/1
50 TABLET, EXTENDED RELEASE ORAL DAILY
Qty: 90 TABLET | Refills: 1 | Status: SHIPPED | OUTPATIENT
Start: 2019-03-13 | End: 2019-03-14 | Stop reason: SDUPTHER

## 2019-03-13 RX ORDER — AMITRIPTYLINE HYDROCHLORIDE 50 MG/1
TABLET, FILM COATED ORAL
Qty: 90 TABLET | Refills: 1 | Status: SHIPPED | OUTPATIENT
Start: 2019-03-13 | End: 2019-03-14 | Stop reason: SDUPTHER

## 2019-03-13 RX ORDER — PRAVASTATIN SODIUM 20 MG
20 TABLET ORAL DAILY
Qty: 90 TABLET | Refills: 1 | Status: SHIPPED | OUTPATIENT
Start: 2019-03-13 | End: 2019-03-14 | Stop reason: SDUPTHER

## 2019-03-14 RX ORDER — AMITRIPTYLINE HYDROCHLORIDE 50 MG/1
TABLET, FILM COATED ORAL
Qty: 90 TABLET | Refills: 1 | Status: SHIPPED | OUTPATIENT
Start: 2019-03-14 | End: 2019-06-20 | Stop reason: SDUPTHER

## 2019-03-14 RX ORDER — BENAZEPRIL HYDROCHLORIDE 40 MG/1
TABLET, FILM COATED ORAL
Qty: 90 TABLET | Refills: 1 | Status: SHIPPED | OUTPATIENT
Start: 2019-03-14 | End: 2019-06-20 | Stop reason: SDUPTHER

## 2019-03-14 RX ORDER — METOPROLOL SUCCINATE 50 MG/1
TABLET, EXTENDED RELEASE ORAL
Qty: 90 TABLET | Refills: 1 | Status: SHIPPED | OUTPATIENT
Start: 2019-03-14 | End: 2019-06-20 | Stop reason: SDUPTHER

## 2019-03-14 RX ORDER — PRAVASTATIN SODIUM 20 MG
TABLET ORAL
Qty: 90 TABLET | Refills: 1 | Status: SHIPPED | OUTPATIENT
Start: 2019-03-14 | End: 2019-06-20 | Stop reason: SDUPTHER

## 2019-06-20 ENCOUNTER — OFFICE VISIT (OUTPATIENT)
Dept: FAMILY MEDICINE CLINIC | Age: 59
End: 2019-06-20
Payer: COMMERCIAL

## 2019-06-20 VITALS
SYSTOLIC BLOOD PRESSURE: 130 MMHG | RESPIRATION RATE: 16 BRPM | HEART RATE: 65 BPM | TEMPERATURE: 97.5 F | BODY MASS INDEX: 46.65 KG/M2 | HEIGHT: 69 IN | DIASTOLIC BLOOD PRESSURE: 80 MMHG | WEIGHT: 315 LBS

## 2019-06-20 DIAGNOSIS — E78.5 HYPERLIPIDEMIA WITH TARGET LDL LESS THAN 100: ICD-10-CM

## 2019-06-20 DIAGNOSIS — E88.810 METABOLIC SYNDROME X: ICD-10-CM

## 2019-06-20 DIAGNOSIS — E88.81 METABOLIC SYNDROME X: Primary | ICD-10-CM

## 2019-06-20 DIAGNOSIS — M25.50 POLYARTHRALGIA: ICD-10-CM

## 2019-06-20 DIAGNOSIS — I10 ESSENTIAL HYPERTENSION: ICD-10-CM

## 2019-06-20 DIAGNOSIS — J30.9 ALLERGIC RHINITIS, UNSPECIFIED SEASONALITY, UNSPECIFIED TRIGGER: ICD-10-CM

## 2019-06-20 DIAGNOSIS — F51.01 PRIMARY INSOMNIA: ICD-10-CM

## 2019-06-20 LAB
A/G RATIO: 1.5 (ref 1.1–2.2)
ALBUMIN SERPL-MCNC: 4.3 G/DL (ref 3.4–5)
ALP BLD-CCNC: 53 U/L (ref 40–129)
ALT SERPL-CCNC: 30 U/L (ref 10–40)
ANION GAP SERPL CALCULATED.3IONS-SCNC: 16 MMOL/L (ref 3–16)
AST SERPL-CCNC: 31 U/L (ref 15–37)
BILIRUB SERPL-MCNC: 0.7 MG/DL (ref 0–1)
BUN BLDV-MCNC: 15 MG/DL (ref 7–20)
CALCIUM SERPL-MCNC: 9.4 MG/DL (ref 8.3–10.6)
CHLORIDE BLD-SCNC: 107 MMOL/L (ref 99–110)
CHOLESTEROL, TOTAL: 143 MG/DL (ref 0–199)
CO2: 20 MMOL/L (ref 21–32)
CREAT SERPL-MCNC: 0.8 MG/DL (ref 0.8–1.3)
GFR AFRICAN AMERICAN: >60
GFR NON-AFRICAN AMERICAN: >60
GLOBULIN: 2.9 G/DL
GLUCOSE BLD-MCNC: 126 MG/DL (ref 70–99)
HDLC SERPL-MCNC: 44 MG/DL (ref 40–60)
LDL CHOLESTEROL CALCULATED: 67 MG/DL
POTASSIUM SERPL-SCNC: 4.7 MMOL/L (ref 3.5–5.1)
SODIUM BLD-SCNC: 143 MMOL/L (ref 136–145)
TOTAL PROTEIN: 7.2 G/DL (ref 6.4–8.2)
TRIGL SERPL-MCNC: 160 MG/DL (ref 0–150)
VLDLC SERPL CALC-MCNC: 32 MG/DL

## 2019-06-20 PROCEDURE — 99214 OFFICE O/P EST MOD 30 MIN: CPT | Performed by: FAMILY MEDICINE

## 2019-06-20 RX ORDER — AMITRIPTYLINE HYDROCHLORIDE 50 MG/1
TABLET, FILM COATED ORAL
Qty: 90 TABLET | Refills: 1 | Status: SHIPPED | OUTPATIENT
Start: 2019-06-20 | End: 2020-03-17 | Stop reason: SDUPTHER

## 2019-06-20 RX ORDER — PRAVASTATIN SODIUM 20 MG
TABLET ORAL
Qty: 90 TABLET | Refills: 1 | Status: SHIPPED | OUTPATIENT
Start: 2019-06-20 | End: 2020-03-17 | Stop reason: SDUPTHER

## 2019-06-20 RX ORDER — METOPROLOL SUCCINATE 50 MG/1
TABLET, EXTENDED RELEASE ORAL
Qty: 90 TABLET | Refills: 1 | Status: SHIPPED | OUTPATIENT
Start: 2019-06-20 | End: 2020-03-17 | Stop reason: SDUPTHER

## 2019-06-20 RX ORDER — ETODOLAC 400 MG/1
TABLET, FILM COATED ORAL
Qty: 180 TABLET | Refills: 1 | Status: SHIPPED | OUTPATIENT
Start: 2019-06-20 | End: 2020-09-10 | Stop reason: SDUPTHER

## 2019-06-20 RX ORDER — BENAZEPRIL HYDROCHLORIDE 40 MG/1
TABLET, FILM COATED ORAL
Qty: 90 TABLET | Refills: 1 | Status: SHIPPED | OUTPATIENT
Start: 2019-06-20 | End: 2020-03-17 | Stop reason: SDUPTHER

## 2019-06-20 ASSESSMENT — PATIENT HEALTH QUESTIONNAIRE - PHQ9
SUM OF ALL RESPONSES TO PHQ9 QUESTIONS 1 & 2: 0
1. LITTLE INTEREST OR PLEASURE IN DOING THINGS: 0
2. FEELING DOWN, DEPRESSED OR HOPELESS: 0
SUM OF ALL RESPONSES TO PHQ QUESTIONS 1-9: 0
SUM OF ALL RESPONSES TO PHQ QUESTIONS 1-9: 0

## 2019-06-20 NOTE — PROGRESS NOTES
CARDIOVASCULAR VISIT NOTE   Subjective:   HPI CHRONIC:   Chief Complaint   Patient presents with    Hypertension      Patient here for follow-up of multiple chronic conditions includin. Metabolic syndrome X    2. Hyperlipidemia with target LDL less than 100    3. Essential hypertension    4. Allergic rhinitis, unspecified seasonality, unspecified trigger    5. Primary insomnia    6. Polyarthralgia      Complaints: pt states he is wonderful. · Following appropriate diet? No  · Exercise: no never  · Taking medicines daily as directed? Yes  · Any side effects of medications? No    Review of Systems   General ROS: fever? No,    Night sweats? No  Endocrine ROS: fatigue? No   Unexpected weight changes? No  Hematological and Lymphatic ROS: easy bruising? No   Swollen lymph nodes? No  Respiratory ROS: cough? No   Wheezing? No  Cardiovascular ROS: chest pain? No   Shortness of breath? No  Neurological ROS: TIA or stroke symptoms? No     * Documentation provided by medical assistant reviewed and updated by provider. HISTORY:  Patient's medications, allergies, past medical, and social histories were reviewed and updated as appropriate.      CHART REVIEW  Health Maintenance   Topic Date Due    Colon Cancer Screen FIT/FOBT  2018    Shingles Vaccine (2 of 2) 10/11/2018    A1C test (Diabetic or Prediabetic)  2019    Potassium monitoring  2019    Creatinine monitoring  2019    Lipid screen  2023    DTaP/Tdap/Td vaccine (4 - Td) 2025    Hepatitis C screen  Completed    HIV screen  Completed    Pneumococcal 0-64 years Vaccine  Aged Out     The 10-year ASCVD risk score (Caryn Bates et al., 2013) is: 9.4%    Values used to calculate the score:      Age: 62 years      Sex: Male      Is Non- : No      Diabetic: No      Tobacco smoker: No      Systolic Blood Pressure: 876 mmHg      Is BP treated: Yes      HDL Cholesterol: 42 mg/dL      Total Cholesterol: 143 mg/dL  Prior to Visit Medications    Medication Sig Taking? Authorizing Provider   metoprolol succinate (TOPROL XL) 50 MG extended release tablet TAKE 1 TABLET BY MOUTH ONCE DAILY Yes Cassandra Cuenca MD   benazepril (LOTENSIN) 40 MG tablet TAKE 1 TABLET BY MOUTH ONCE DAILY Yes Cassandra Cuenca MD   amitriptyline (ELAVIL) 50 MG tablet TAKE 1 TABLET BY MOUTH ONCE DAILY AT NIGHT Yes Cassandra Cuenca MD   metFORMIN (GLUCOPHAGE) 1000 MG tablet TAKE 2 TABLETS BY MOUTH ONCE DAILY WITH BREAKFAST Yes Cassandra Cuenca MD   pravastatin (PRAVACHOL) 20 MG tablet TAKE 1 TABLET BY MOUTH ONCE DAILY IN THE EVENING Yes Cassandra Cuenca MD   etodolac (LODINE) 400 MG tablet TAKE ONE TABLET BY MOUTH TWICE DAILY AS NEEDED Yes Cassandra Cuenca MD   fluticasone (FLONASE) 50 MCG/ACT nasal spray 1 spray by Nasal route daily Yes Cassandra Cuenca MD   albuterol sulfate  (90 BASE) MCG/ACT inhaler Inhale 2 puffs into the lungs every 4 hours as needed for Wheezing May substitute for insurance preferred (Ventolin, Proventil, ProAir) Yes Cassandra Cuenca MD   aspirin 81 MG tablet Take 81 mg by mouth daily. Yes Historical Provider, MD   multivitamin [facility]) per tablet Take 1 tablet by mouth daily. Yes Historical Provider, MD   Garlic (ODORLESS GARLIC) 5687 MG TABS Take 1 tablet by mouth daily. Yes Historical Provider, MD      Family History   Problem Relation Age of Onset    Kidney Disease Unknown     Alcohol Abuse Mother     Diabetes Father     Heart Disease Father     Stroke Father     Diabetes Sister     Stroke Sister     Coronary Art Dis Sister      Social History     Tobacco Use    Smoking status: Never Smoker    Smokeless tobacco: Never Used    Tobacco comment: Advised to not start   Substance Use Topics    Alcohol use:  Yes     Alcohol/week: 0.0 oz     Comment: 3 beers/9 weeks    Drug use: No      LAST LABS  Cholesterol, Total   Date Value Ref Range Status   08/16/2018 143 0 - 199 mg/dL Final     LDL Calculated   Date Value Ref Range

## 2019-06-20 NOTE — PATIENT INSTRUCTIONS
Due for Shingrix #2 if we have. INSTRUCTIONS  NEXT APPOINTMENT: Please schedule annual complete physical (30 minutes) in 6-7 months. · PLEASE TAKE THIS FORM TO CHECK-OUT WINDOW TO SCHEDULE NEXT VISIT. · PLEASE GET BLOODWORK DRAWN TODAY ON FIRST FLOOR in 170. Take orders with you. RESULTS- most blood tests back in couple days. We will call you if any problems. If bloodwork good, you will get letter in mail or notified thru [address] (if signed up) within 2 weeks. If you do not, please call office. · Please get flu vaccine when available in fall. Can get either at this office or at stores such as [employer] and [employer].

## 2019-06-21 LAB
ESTIMATED AVERAGE GLUCOSE: 137 MG/DL
HBA1C MFR BLD: 6.4 %

## 2019-06-27 ENCOUNTER — TELEPHONE (OUTPATIENT)
Dept: FAMILY MEDICINE CLINIC | Age: 59
End: 2019-06-27

## 2020-03-16 ENCOUNTER — TELEPHONE (OUTPATIENT)
Dept: FAMILY MEDICINE CLINIC | Age: 60
End: 2020-03-16

## 2020-03-17 RX ORDER — PRAVASTATIN SODIUM 20 MG
TABLET ORAL
Qty: 90 TABLET | Refills: 1 | Status: CANCELLED | OUTPATIENT
Start: 2020-03-17

## 2020-03-17 RX ORDER — BENAZEPRIL HYDROCHLORIDE 40 MG/1
TABLET, FILM COATED ORAL
Qty: 90 TABLET | Refills: 1 | Status: SHIPPED | OUTPATIENT
Start: 2020-03-17 | End: 2020-09-10 | Stop reason: SDUPTHER

## 2020-03-17 RX ORDER — METOPROLOL SUCCINATE 50 MG/1
TABLET, EXTENDED RELEASE ORAL
Qty: 90 TABLET | Refills: 1 | Status: SHIPPED | OUTPATIENT
Start: 2020-03-17 | End: 2020-09-10 | Stop reason: SDUPTHER

## 2020-03-17 RX ORDER — AMITRIPTYLINE HYDROCHLORIDE 50 MG/1
TABLET, FILM COATED ORAL
Qty: 90 TABLET | Refills: 1 | Status: CANCELLED | OUTPATIENT
Start: 2020-03-17

## 2020-03-17 RX ORDER — METOPROLOL SUCCINATE 50 MG/1
TABLET, EXTENDED RELEASE ORAL
Qty: 90 TABLET | Refills: 1 | Status: CANCELLED | OUTPATIENT
Start: 2020-03-17

## 2020-03-17 RX ORDER — BENAZEPRIL HYDROCHLORIDE 40 MG/1
TABLET, FILM COATED ORAL
Qty: 90 TABLET | Refills: 1 | Status: CANCELLED | OUTPATIENT
Start: 2020-03-17

## 2020-03-17 RX ORDER — AMOXICILLIN 500 MG/1
1000 CAPSULE ORAL 2 TIMES DAILY
Qty: 40 CAPSULE | Refills: 0 | Status: SHIPPED | OUTPATIENT
Start: 2020-03-17 | End: 2020-03-27

## 2020-03-17 RX ORDER — PRAVASTATIN SODIUM 20 MG
TABLET ORAL
Qty: 90 TABLET | Refills: 0 | Status: SHIPPED | OUTPATIENT
Start: 2020-03-17 | End: 2020-08-05 | Stop reason: SDUPTHER

## 2020-03-17 RX ORDER — AMITRIPTYLINE HYDROCHLORIDE 50 MG/1
TABLET, FILM COATED ORAL
Qty: 90 TABLET | Refills: 1 | Status: SHIPPED | OUTPATIENT
Start: 2020-03-17 | End: 2020-09-10 | Stop reason: SDUPTHER

## 2020-03-23 ENCOUNTER — TELEPHONE (OUTPATIENT)
Dept: FAMILY MEDICINE CLINIC | Age: 60
End: 2020-03-23

## 2020-03-23 NOTE — TELEPHONE ENCOUNTER
Called and spoke to pt  He is feeling somewhat better, cough is gone, no fever, no SOB. Still has ear pain in Rt ear. Thinks this is from sinus drainage. Has some clear fluid coming from ear. Has 4 days left of abx. Pt advised to stay home for now, continue meds, rest and hydrate. Will call us Wednesday if ear pain has not improved. Please advise with any further instruction.

## 2020-08-05 ENCOUNTER — TELEPHONE (OUTPATIENT)
Dept: FAMILY MEDICINE CLINIC | Age: 60
End: 2020-08-05

## 2020-08-05 RX ORDER — AMLODIPINE BESYLATE 5 MG/1
5 TABLET ORAL DAILY
Qty: 30 TABLET | Refills: 5 | Status: SHIPPED | OUTPATIENT
Start: 2020-08-05 | End: 2020-09-10 | Stop reason: SDUPTHER

## 2020-08-05 RX ORDER — PRAVASTATIN SODIUM 20 MG
TABLET ORAL
Qty: 90 TABLET | Refills: 0 | Status: SHIPPED | OUTPATIENT
Start: 2020-08-05 | End: 2020-09-10 | Stop reason: SDUPTHER

## 2020-08-05 NOTE — TELEPHONE ENCOUNTER
Pt called at 1:40 today for his appt. This was changed from a VV to an OV but he states he was not notified of the change. He is very upset as he is an  and leaving town again tmrw. His bp has been high for the past few months, 171/95, 166/95.156/95,164/96. Is taking bp meds as prescribed. Has been out of cholesterol med for 2 months. States he called office and was told to call the pharmacy and they will send us refill request.  There was never a message put back that he needed his medication. I was very apologetic. He is scheduled for an OV on 08/20 when he returns. Med is pending. Please advise if bp med can be adjusted in the meantime. He states if it is sent to his [facility] they are always able to pull it over to where he is at the time.

## 2020-08-20 ENCOUNTER — OFFICE VISIT (OUTPATIENT)
Dept: FAMILY MEDICINE CLINIC | Age: 60
End: 2020-08-20
Payer: COMMERCIAL

## 2020-08-20 VITALS
DIASTOLIC BLOOD PRESSURE: 70 MMHG | TEMPERATURE: 97 F | OXYGEN SATURATION: 96 % | WEIGHT: 315 LBS | SYSTOLIC BLOOD PRESSURE: 134 MMHG | HEIGHT: 69 IN | HEART RATE: 70 BPM | RESPIRATION RATE: 14 BRPM | BODY MASS INDEX: 46.65 KG/M2

## 2020-08-20 LAB — HBA1C MFR BLD: 6.9 %

## 2020-08-20 PROCEDURE — 99214 OFFICE O/P EST MOD 30 MIN: CPT | Performed by: FAMILY MEDICINE

## 2020-08-20 PROCEDURE — 83036 HEMOGLOBIN GLYCOSYLATED A1C: CPT | Performed by: FAMILY MEDICINE

## 2020-08-20 ASSESSMENT — PATIENT HEALTH QUESTIONNAIRE - PHQ9
1. LITTLE INTEREST OR PLEASURE IN DOING THINGS: 0
SUM OF ALL RESPONSES TO PHQ QUESTIONS 1-9: 0
SUM OF ALL RESPONSES TO PHQ9 QUESTIONS 1 & 2: 0
2. FEELING DOWN, DEPRESSED OR HOPELESS: 0
SUM OF ALL RESPONSES TO PHQ QUESTIONS 1-9: 0

## 2020-08-20 NOTE — PATIENT INSTRUCTIONS
INSTRUCTIONS  NEXT APPOINTMENT: Please schedule annual complete physical (30 minutes) in 6 months. · PLEASE TAKE THIS FORM TO CHECK-OUT WINDOW TO SCHEDULE NEXT VISIT. · PLEASE GET Fasting BLOODWORK DRAWN in 3-4 weeks ON FIRST FLOOR in 170. RESULTS- most blood tests back in couple days. We will call you if any problems. If bloodwork good, you will get letter in mail or notified thru [address] (if signed up) within 2 weeks. If you do not, please call office. · Please get flu vaccine when available in fall. Can get either at this office or at stores such as [employer]

## 2020-08-20 NOTE — PROGRESS NOTES
56 years      Sex: Male      Is Non- : No      Diabetic: No      Tobacco smoker: No      Systolic Blood Pressure: 008 mmHg      Is BP treated: Yes      HDL Cholesterol: 44 mg/dL      Total Cholesterol: 143 mg/dL  Prior to Visit Medications    Medication Sig Taking? Authorizing Provider   pravastatin (PRAVACHOL) 20 MG tablet TAKE 1 TABLET BY MOUTH ONCE DAILY IN THE EVENING Yes Kassi Conley MD   amLODIPine (NORVASC) 5 MG tablet Take 1 tablet by mouth daily Yes Kassi Conley MD   metoprolol succinate (TOPROL XL) 50 MG extended release tablet TAKE 1 TABLET BY MOUTH ONCE DAILY Yes Kassi Conley MD   benazepril (LOTENSIN) 40 MG tablet TAKE 1 TABLET BY MOUTH ONCE DAILY Yes Kassi Conley MD   amitriptyline (ELAVIL) 50 MG tablet TAKE 1 TABLET BY MOUTH ONCE DAILY AT NIGHT Yes Kassi Conley MD   metFORMIN (GLUCOPHAGE) 1000 MG tablet TAKE 2 TABLETS BY MOUTH ONCE DAILY WITH BREAKFAST Yes Kassi Conley MD   etodolac (LODINE) 400 MG tablet TAKE ONE TABLET BY MOUTH TWICE DAILY AS NEEDED Yes Kassi Conley MD   fluticasone (FLONASE) 50 MCG/ACT nasal spray 1 spray by Nasal route daily Yes Kassi Conley MD   albuterol sulfate  (90 BASE) MCG/ACT inhaler Inhale 2 puffs into the lungs every 4 hours as needed for Wheezing May substitute for insurance preferred (Ventolin, Proventil, ProAir) Yes Kassi Conley MD   aspirin 81 MG tablet Take 81 mg by mouth daily. Yes Historical Provider, MD   multivitamin [facility]) per tablet Take 1 tablet by mouth daily. Yes Historical Provider, MD   Garlic (ODORLESS GARLIC) 7332 MG TABS Take 1 tablet by mouth daily.    Yes Historical Provider, MD      Family History   Problem Relation Age of Onset    Kidney Disease Unknown     Alcohol Abuse Mother     Diabetes Father     Heart Disease Father     Stroke Father     Diabetes Sister     Stroke Sister     Coronary Art Dis Sister      Social History     Tobacco Use    Smoking status: Never Smoker    Smokeless tobacco: Never Used    Tobacco comment: Advised to not start   Substance Use Topics    Alcohol use: Yes     Alcohol/week: 0.0 standard drinks     Comment: 3 beers/9 weeks    Drug use: No      LAST LABS  Cholesterol, Total   Date Value Ref Range Status   06/20/2019 143 0 - 199 mg/dL Final     LDL Calculated   Date Value Ref Range Status   06/20/2019 67 <100 mg/dL Final     HDL   Date Value Ref Range Status   06/20/2019 44 40 - 60 mg/dL Final   05/21/2012 41 40 - 60 mg/dl Final     Triglycerides   Date Value Ref Range Status   06/20/2019 160 (H) 0 - 150 mg/dL Final     Lab Results   Component Value Date    GLUCOSE 126 (H) 06/20/2019     Lab Results   Component Value Date     06/20/2019    K 4.7 06/20/2019    CREATININE 0.8 06/20/2019     Lab Results   Component Value Date    WBC 9.2 07/03/2014    HGB 15.7 07/03/2014    HCT 46.1 07/03/2014    MCV 95.2 07/03/2014     07/03/2014     Lab Results   Component Value Date    ALT 30 06/20/2019    AST 31 06/20/2019    ALKPHOS 53 06/20/2019    BILITOT 0.7 06/20/2019     No results found for: TSH  Lab Results   Component Value Date    LABA1C 6.4 06/20/2019        Objective:   PHYSICAL EXAM   /70   Pulse 70   Temp 97 °F (36.1 °C)   Resp 14   Ht 5' 9\" (1.753 m)   Wt (!) 349 lb (158.3 kg)   SpO2 96%   BMI 51.54 kg/m²   BP Readings from Last 5 Encounters:   08/20/20 134/70   06/20/19 130/80   08/16/18 118/78   11/17/17 130/74   10/02/17 120/70     Wt Readings from Last 5 Encounters:   08/20/20 (!) 349 lb (158.3 kg)   06/20/19 (!) 350 lb (158.8 kg)   08/16/18 (!) 342 lb (155.1 kg)   11/17/17 (!) 353 lb (160.1 kg)   10/02/17 (!) 358 lb (162.4 kg)      GENERAL:   · well-developed, well-nourished, alert, no distress.      EYES:   · External findings: lids and lashes normal and conjunctivae and sclerae normal  LUNGS:    · Breathing unlabored  · clear to auscultation bilaterally and good air movement  CARDIOVASC:   · regular rate and rhythm, S1, S2 normal. No murmur, click, rub or gallop  · LEGS:  Lower extremity edema: none    SKIN: warm and dry  PSYCH:    · Alert and oriented  · Normal reasoning, insight good  · Facial expressions full, mood appropriate  · No memory disturbance noted     Assessment and Plan:      Diagnosis Orders   1. Metabolic syndrome X  POCT glycosylated hemoglobin (Hb A1C)   2. Essential hypertension     3. Hyperlipidemia with target LDL less than 100     4. Class 3 severe obesity due to excess calories with serious comorbidity and body mass index (BMI) of 50.0 to 59.9 in adult (HCC)     Stable. Continue current Tx plan except for changes marked below. INSTRUCTIONS  NEXT APPOINTMENT: Please schedule annual complete physical (30 minutes) in 6 months. · PLEASE TAKE THIS FORM TO CHECK-OUT WINDOW TO SCHEDULE NEXT VISIT. · PLEASE GET Fasting BLOODWORK DRAWN in 3-4 weeks ON FIRST FLOOR in 170. RESULTS- most blood tests back in couple days. We will call you if any problems. If bloodwork good, you will get letter in mail or notified thru [address] (if signed up) within 2 weeks. If you do not, please call office. · Please get flu vaccine when available in fall. Can get either at this office or at stores such as [employer].

## 2020-09-10 RX ORDER — ETODOLAC 400 MG/1
TABLET, FILM COATED ORAL
Qty: 180 TABLET | Refills: 1 | Status: SHIPPED | OUTPATIENT
Start: 2020-09-10 | End: 2020-10-19 | Stop reason: SDUPTHER

## 2020-09-10 RX ORDER — AMLODIPINE BESYLATE 5 MG/1
5 TABLET ORAL DAILY
Qty: 90 TABLET | Refills: 1 | Status: SHIPPED | OUTPATIENT
Start: 2020-09-10 | End: 2021-03-22 | Stop reason: SDUPTHER

## 2020-09-10 RX ORDER — METOPROLOL SUCCINATE 50 MG/1
TABLET, EXTENDED RELEASE ORAL
Qty: 90 TABLET | Refills: 1 | Status: SHIPPED | OUTPATIENT
Start: 2020-09-10 | End: 2021-03-16

## 2020-09-10 RX ORDER — AMITRIPTYLINE HYDROCHLORIDE 50 MG/1
TABLET, FILM COATED ORAL
Qty: 90 TABLET | Refills: 1 | Status: SHIPPED | OUTPATIENT
Start: 2020-09-10 | End: 2021-03-16

## 2020-09-10 RX ORDER — PRAVASTATIN SODIUM 20 MG
TABLET ORAL
Qty: 90 TABLET | Refills: 1 | Status: SHIPPED | OUTPATIENT
Start: 2020-09-10 | End: 2021-03-16

## 2020-09-10 RX ORDER — BENAZEPRIL HYDROCHLORIDE 40 MG/1
TABLET, FILM COATED ORAL
Qty: 90 TABLET | Refills: 1 | Status: SHIPPED | OUTPATIENT
Start: 2020-09-10 | End: 2021-03-05 | Stop reason: SDUPTHER

## 2020-09-10 NOTE — TELEPHONE ENCOUNTER
----- Message from Elias Hutchinson sent at 9/10/2020  8:31 AM EDT -----  Subject: Message to Provider    QUESTIONS  Information for Provider? etodolac 400 Mg -2x a day-6 pills left send to   [address]  ---------------------------------------------------------------------------  --------------  CALL BACK INFO  What is the best way for the office to contact you? OK to leave message on   voicemail  Preferred Call Back Phone Number? [phone]  ---------------------------------------------------------------------------  --------------  SCRIPT ANSWERS  Relationship to Patient?  Self

## 2020-10-19 RX ORDER — ETODOLAC 400 MG/1
TABLET, FILM COATED ORAL
Qty: 180 TABLET | Refills: 1 | Status: SHIPPED | OUTPATIENT
Start: 2020-10-19 | End: 2021-03-16

## 2020-12-21 ENCOUNTER — TELEPHONE (OUTPATIENT)
Dept: FAMILY MEDICINE CLINIC | Age: 60
End: 2020-12-21

## 2021-03-16 ENCOUNTER — ANESTHESIA (OUTPATIENT)
Dept: OPERATING ROOM | Age: 61
End: 2021-03-16
Payer: COMMERCIAL

## 2021-03-16 ENCOUNTER — APPOINTMENT (OUTPATIENT)
Dept: CT IMAGING | Age: 61
End: 2021-03-16
Payer: COMMERCIAL

## 2021-03-16 ENCOUNTER — ANESTHESIA EVENT (OUTPATIENT)
Dept: OPERATING ROOM | Age: 61
End: 2021-03-16
Payer: COMMERCIAL

## 2021-03-16 ENCOUNTER — APPOINTMENT (OUTPATIENT)
Dept: GENERAL RADIOLOGY | Age: 61
End: 2021-03-16
Payer: COMMERCIAL

## 2021-03-16 ENCOUNTER — HOSPITAL ENCOUNTER (OUTPATIENT)
Age: 61
Setting detail: OBSERVATION
Discharge: HOME | End: 2021-03-17
Attending: EMERGENCY MEDICINE | Admitting: FAMILY MEDICINE
Payer: COMMERCIAL

## 2021-03-16 VITALS
SYSTOLIC BLOOD PRESSURE: 145 MMHG | RESPIRATION RATE: 14 BRPM | DIASTOLIC BLOOD PRESSURE: 88 MMHG | OXYGEN SATURATION: 100 %

## 2021-03-16 DIAGNOSIS — N20.0 KIDNEY STONE: Primary | ICD-10-CM

## 2021-03-16 PROBLEM — N20.1 URETERAL STONE: Status: ACTIVE | Noted: 2021-03-16

## 2021-03-16 LAB
ALBUMIN SERPL-MCNC: 4 G/DL (ref 3.5–5.2)
ALP BLD-CCNC: 53 U/L (ref 40–129)
ALT SERPL-CCNC: 33 U/L (ref 0–40)
ANION GAP SERPL CALCULATED.3IONS-SCNC: 16 MMOL/L (ref 7–16)
AST SERPL-CCNC: 28 U/L (ref 0–39)
BACTERIA: ABNORMAL /HPF
BASOPHILS ABSOLUTE: 0.11 E9/L (ref 0–0.2)
BASOPHILS RELATIVE PERCENT: 0.9 % (ref 0–2)
BILIRUB SERPL-MCNC: 0.7 MG/DL (ref 0–1.2)
BILIRUBIN URINE: ABNORMAL
BLOOD, URINE: ABNORMAL
BUN BLDV-MCNC: 21 MG/DL (ref 8–23)
CALCIUM SERPL-MCNC: 9.5 MG/DL (ref 8.6–10.2)
CHLORIDE BLD-SCNC: 101 MMOL/L (ref 98–107)
CHOLESTEROL, TOTAL: 144 MG/DL (ref 0–199)
CLARITY: CLEAR
CO2: 20 MMOL/L (ref 22–29)
COLOR: YELLOW
CREAT SERPL-MCNC: 1.2 MG/DL (ref 0.7–1.2)
EOSINOPHILS ABSOLUTE: 0.19 E9/L (ref 0.05–0.5)
EOSINOPHILS RELATIVE PERCENT: 1.5 % (ref 0–6)
GFR AFRICAN AMERICAN: >60
GFR NON-AFRICAN AMERICAN: >60 ML/MIN/1.73
GLUCOSE BLD-MCNC: 207 MG/DL (ref 74–99)
GLUCOSE URINE: NEGATIVE MG/DL
HBA1C MFR BLD: 6.5 % (ref 4–5.6)
HCT VFR BLD CALC: 45.7 % (ref 37–54)
HDLC SERPL-MCNC: 43 MG/DL
HEMOGLOBIN: 15.6 G/DL (ref 12.5–16.5)
IMMATURE GRANULOCYTES #: 0.19 E9/L
IMMATURE GRANULOCYTES %: 1.5 % (ref 0–5)
KETONES, URINE: NEGATIVE MG/DL
LACTIC ACID: 2.1 MMOL/L (ref 0.5–2.2)
LDL CHOLESTEROL CALCULATED: 73 MG/DL (ref 0–99)
LEUKOCYTE ESTERASE, URINE: NEGATIVE
LIPASE: 27 U/L (ref 13–60)
LYMPHOCYTES ABSOLUTE: 2.05 E9/L (ref 1.5–4)
LYMPHOCYTES RELATIVE PERCENT: 16.7 % (ref 20–42)
MCH RBC QN AUTO: 32.4 PG (ref 26–35)
MCHC RBC AUTO-ENTMCNC: 34.1 % (ref 32–34.5)
MCV RBC AUTO: 95 FL (ref 80–99.9)
METER GLUCOSE: 127 MG/DL (ref 74–99)
METER GLUCOSE: 137 MG/DL (ref 74–99)
METER GLUCOSE: 165 MG/DL (ref 74–99)
MONOCYTES ABSOLUTE: 1.08 E9/L (ref 0.1–0.95)
MONOCYTES RELATIVE PERCENT: 8.8 % (ref 2–12)
NEUTROPHILS ABSOLUTE: 8.67 E9/L (ref 1.8–7.3)
NEUTROPHILS RELATIVE PERCENT: 70.6 % (ref 43–80)
NITRITE, URINE: NEGATIVE
PDW BLD-RTO: 11.9 FL (ref 11.5–15)
PH UA: 5.5 (ref 5–9)
PLATELET # BLD: 269 E9/L (ref 130–450)
PMV BLD AUTO: 10.1 FL (ref 7–12)
POTASSIUM SERPL-SCNC: 4.1 MMOL/L (ref 3.5–5)
PROTEIN UA: NEGATIVE MG/DL
RBC # BLD: 4.81 E12/L (ref 3.8–5.8)
RBC UA: >20 /HPF (ref 0–2)
SODIUM BLD-SCNC: 137 MMOL/L (ref 132–146)
SPECIFIC GRAVITY UA: >=1.03 (ref 1–1.03)
T4 FREE: 1 NG/DL (ref 0.93–1.7)
TOTAL PROTEIN: 7.6 G/DL (ref 6.4–8.3)
TRIGL SERPL-MCNC: 141 MG/DL (ref 0–149)
TSH SERPL DL<=0.05 MIU/L-ACNC: 10.18 UIU/ML (ref 0.27–4.2)
UROBILINOGEN, URINE: 0.2 E.U./DL
VLDLC SERPL CALC-MCNC: 28 MG/DL
WBC # BLD: 12.3 E9/L (ref 4.5–11.5)
WBC UA: ABNORMAL /HPF (ref 0–5)

## 2021-03-16 PROCEDURE — G0378 HOSPITAL OBSERVATION PER HR: HCPCS

## 2021-03-16 PROCEDURE — 3700000000 HC ANESTHESIA ATTENDED CARE: Performed by: UROLOGY

## 2021-03-16 PROCEDURE — C1769 GUIDE WIRE: HCPCS | Performed by: UROLOGY

## 2021-03-16 PROCEDURE — 84439 ASSAY OF FREE THYROXINE: CPT

## 2021-03-16 PROCEDURE — 74176 CT ABD & PELVIS W/O CONTRAST: CPT

## 2021-03-16 PROCEDURE — 81001 URINALYSIS AUTO W/SCOPE: CPT

## 2021-03-16 PROCEDURE — 80053 COMPREHEN METABOLIC PANEL: CPT

## 2021-03-16 PROCEDURE — 6360000002 HC RX W HCPCS: Performed by: UROLOGY

## 2021-03-16 PROCEDURE — 2580000003 HC RX 258: Performed by: UROLOGY

## 2021-03-16 PROCEDURE — 3700000001 HC ADD 15 MINUTES (ANESTHESIA): Performed by: UROLOGY

## 2021-03-16 PROCEDURE — 96374 THER/PROPH/DIAG INJ IV PUSH: CPT

## 2021-03-16 PROCEDURE — 99284 EMERGENCY DEPT VISIT MOD MDM: CPT

## 2021-03-16 PROCEDURE — C2617 STENT, NON-COR, TEM W/O DEL: HCPCS | Performed by: UROLOGY

## 2021-03-16 PROCEDURE — 7100000000 HC PACU RECOVERY - FIRST 15 MIN: Performed by: UROLOGY

## 2021-03-16 PROCEDURE — 6360000002 HC RX W HCPCS: Performed by: NURSE PRACTITIONER

## 2021-03-16 PROCEDURE — 2580000003 HC RX 258: Performed by: NURSE ANESTHETIST, CERTIFIED REGISTERED

## 2021-03-16 PROCEDURE — 80061 LIPID PANEL: CPT

## 2021-03-16 PROCEDURE — 2580000003 HC RX 258: Performed by: EMERGENCY MEDICINE

## 2021-03-16 PROCEDURE — 88300 SURGICAL PATH GROSS: CPT

## 2021-03-16 PROCEDURE — 7100000001 HC PACU RECOVERY - ADDTL 15 MIN: Performed by: UROLOGY

## 2021-03-16 PROCEDURE — 3600000004 HC SURGERY LEVEL 4 BASE: Performed by: UROLOGY

## 2021-03-16 PROCEDURE — 2580000003 HC RX 258: Performed by: NURSE PRACTITIONER

## 2021-03-16 PROCEDURE — 6360000002 HC RX W HCPCS

## 2021-03-16 PROCEDURE — 85025 COMPLETE CBC W/AUTO DIFF WBC: CPT

## 2021-03-16 PROCEDURE — 6360000002 HC RX W HCPCS: Performed by: FAMILY MEDICINE

## 2021-03-16 PROCEDURE — 83605 ASSAY OF LACTIC ACID: CPT

## 2021-03-16 PROCEDURE — 82962 GLUCOSE BLOOD TEST: CPT

## 2021-03-16 PROCEDURE — 2709999900 HC NON-CHARGEABLE SUPPLY: Performed by: UROLOGY

## 2021-03-16 PROCEDURE — 96375 TX/PRO/DX INJ NEW DRUG ADDON: CPT

## 2021-03-16 PROCEDURE — 87088 URINE BACTERIA CULTURE: CPT

## 2021-03-16 PROCEDURE — 6360000002 HC RX W HCPCS: Performed by: NURSE ANESTHETIST, CERTIFIED REGISTERED

## 2021-03-16 PROCEDURE — 84443 ASSAY THYROID STIM HORMONE: CPT

## 2021-03-16 PROCEDURE — 3600000014 HC SURGERY LEVEL 4 ADDTL 15MIN: Performed by: UROLOGY

## 2021-03-16 PROCEDURE — 6370000000 HC RX 637 (ALT 250 FOR IP): Performed by: UROLOGY

## 2021-03-16 PROCEDURE — 83036 HEMOGLOBIN GLYCOSYLATED A1C: CPT

## 2021-03-16 PROCEDURE — 96376 TX/PRO/DX INJ SAME DRUG ADON: CPT

## 2021-03-16 PROCEDURE — 6360000002 HC RX W HCPCS: Performed by: EMERGENCY MEDICINE

## 2021-03-16 PROCEDURE — 2720000010 HC SURG SUPPLY STERILE: Performed by: UROLOGY

## 2021-03-16 PROCEDURE — 82365 CALCULUS SPECTROSCOPY: CPT

## 2021-03-16 PROCEDURE — 83690 ASSAY OF LIPASE: CPT

## 2021-03-16 PROCEDURE — 36415 COLL VENOUS BLD VENIPUNCTURE: CPT

## 2021-03-16 PROCEDURE — 3209999900 FLUORO FOR SURGICAL PROCEDURES

## 2021-03-16 DEVICE — URETERAL STENT
Type: IMPLANTABLE DEVICE | Site: URETER | Status: FUNCTIONAL
Brand: PERCUFLEX™

## 2021-03-16 RX ORDER — DIPHENHYDRAMINE HYDROCHLORIDE 50 MG/ML
25 INJECTION INTRAMUSCULAR; INTRAVENOUS EVERY 6 HOURS PRN
Status: DISCONTINUED | OUTPATIENT
Start: 2021-03-16 | End: 2021-03-18 | Stop reason: HOSPADM

## 2021-03-16 RX ORDER — ONDANSETRON 2 MG/ML
4 INJECTION INTRAMUSCULAR; INTRAVENOUS EVERY 6 HOURS PRN
Status: DISCONTINUED | OUTPATIENT
Start: 2021-03-16 | End: 2021-03-16 | Stop reason: SDUPTHER

## 2021-03-16 RX ORDER — ONDANSETRON 2 MG/ML
4 INJECTION INTRAMUSCULAR; INTRAVENOUS EVERY 6 HOURS PRN
Status: CANCELLED | OUTPATIENT
Start: 2021-03-16

## 2021-03-16 RX ORDER — KETOROLAC TROMETHAMINE 30 MG/ML
30 INJECTION, SOLUTION INTRAMUSCULAR; INTRAVENOUS EVERY 6 HOURS
Status: DISCONTINUED | OUTPATIENT
Start: 2021-03-16 | End: 2021-03-18 | Stop reason: HOSPADM

## 2021-03-16 RX ORDER — TRAMADOL HYDROCHLORIDE 50 MG/1
50 TABLET ORAL EVERY 6 HOURS PRN
Status: CANCELLED | OUTPATIENT
Start: 2021-03-16

## 2021-03-16 RX ORDER — FENTANYL CITRATE 50 UG/ML
25 INJECTION, SOLUTION INTRAMUSCULAR; INTRAVENOUS ONCE
Status: COMPLETED | OUTPATIENT
Start: 2021-03-16 | End: 2021-03-16

## 2021-03-16 RX ORDER — METOPROLOL SUCCINATE 50 MG/1
50 TABLET, EXTENDED RELEASE ORAL DAILY
COMMUNITY
End: 2021-03-22 | Stop reason: SDUPTHER

## 2021-03-16 RX ORDER — PHENAZOPYRIDINE HYDROCHLORIDE 100 MG/1
100 TABLET, FILM COATED ORAL 3 TIMES DAILY PRN
Status: DISCONTINUED | OUTPATIENT
Start: 2021-03-16 | End: 2021-03-18 | Stop reason: HOSPADM

## 2021-03-16 RX ORDER — IPRATROPIUM BROMIDE AND ALBUTEROL SULFATE 2.5; .5 MG/3ML; MG/3ML
1 SOLUTION RESPIRATORY (INHALATION) EVERY 4 HOURS PRN
Status: DISCONTINUED | OUTPATIENT
Start: 2021-03-16 | End: 2021-03-18 | Stop reason: HOSPADM

## 2021-03-16 RX ORDER — PRAVASTATIN SODIUM 20 MG
20 TABLET ORAL NIGHTLY
COMMUNITY
End: 2021-03-22 | Stop reason: SDUPTHER

## 2021-03-16 RX ORDER — TRAMADOL HYDROCHLORIDE 50 MG/1
100 TABLET ORAL EVERY 6 HOURS PRN
Status: CANCELLED | OUTPATIENT
Start: 2021-03-16

## 2021-03-16 RX ORDER — BENAZEPRIL HYDROCHLORIDE 40 MG/1
40 TABLET, FILM COATED ORAL DAILY
COMMUNITY
End: 2021-06-14 | Stop reason: SDUPTHER

## 2021-03-16 RX ORDER — SODIUM CHLORIDE 9 MG/ML
INJECTION, SOLUTION INTRAVENOUS CONTINUOUS
Status: DISCONTINUED | OUTPATIENT
Start: 2021-03-16 | End: 2021-03-16 | Stop reason: SDUPTHER

## 2021-03-16 RX ORDER — AMLODIPINE BESYLATE 5 MG/1
5 TABLET ORAL DAILY
Status: DISCONTINUED | OUTPATIENT
Start: 2021-03-16 | End: 2021-03-18 | Stop reason: HOSPADM

## 2021-03-16 RX ORDER — AMITRIPTYLINE HYDROCHLORIDE 25 MG/1
50 TABLET, FILM COATED ORAL NIGHTLY
Status: DISCONTINUED | OUTPATIENT
Start: 2021-03-16 | End: 2021-03-18 | Stop reason: HOSPADM

## 2021-03-16 RX ORDER — KETOROLAC TROMETHAMINE 30 MG/ML
30 INJECTION, SOLUTION INTRAMUSCULAR; INTRAVENOUS ONCE
Status: COMPLETED | OUTPATIENT
Start: 2021-03-16 | End: 2021-03-16

## 2021-03-16 RX ORDER — SODIUM CHLORIDE 0.9 % (FLUSH) 0.9 %
10 SYRINGE (ML) INJECTION EVERY 12 HOURS SCHEDULED
Status: DISCONTINUED | OUTPATIENT
Start: 2021-03-16 | End: 2021-03-18 | Stop reason: HOSPADM

## 2021-03-16 RX ORDER — METOPROLOL SUCCINATE 50 MG/1
50 TABLET, EXTENDED RELEASE ORAL DAILY
Status: DISCONTINUED | OUTPATIENT
Start: 2021-03-16 | End: 2021-03-18 | Stop reason: HOSPADM

## 2021-03-16 RX ORDER — MIDAZOLAM HYDROCHLORIDE 1 MG/ML
INJECTION INTRAMUSCULAR; INTRAVENOUS PRN
Status: DISCONTINUED | OUTPATIENT
Start: 2021-03-16 | End: 2021-03-16 | Stop reason: SDUPTHER

## 2021-03-16 RX ORDER — ACETAMINOPHEN 325 MG/1
650 TABLET ORAL EVERY 4 HOURS PRN
Status: DISCONTINUED | OUTPATIENT
Start: 2021-03-16 | End: 2021-03-18 | Stop reason: HOSPADM

## 2021-03-16 RX ORDER — LEVOTHYROXINE SODIUM 0.03 MG/1
50 TABLET ORAL DAILY
Status: DISCONTINUED | OUTPATIENT
Start: 2021-03-16 | End: 2021-03-17

## 2021-03-16 RX ORDER — ONDANSETRON 2 MG/ML
4 INJECTION INTRAMUSCULAR; INTRAVENOUS EVERY 6 HOURS PRN
Status: DISCONTINUED | OUTPATIENT
Start: 2021-03-16 | End: 2021-03-18 | Stop reason: HOSPADM

## 2021-03-16 RX ORDER — SODIUM CHLORIDE 9 MG/ML
INJECTION, SOLUTION INTRAVENOUS CONTINUOUS
Status: DISCONTINUED | OUTPATIENT
Start: 2021-03-16 | End: 2021-03-18 | Stop reason: HOSPADM

## 2021-03-16 RX ORDER — PROPOFOL 10 MG/ML
INJECTION, EMULSION INTRAVENOUS CONTINUOUS PRN
Status: DISCONTINUED | OUTPATIENT
Start: 2021-03-16 | End: 2021-03-16 | Stop reason: SDUPTHER

## 2021-03-16 RX ORDER — LISINOPRIL 20 MG/1
40 TABLET ORAL DAILY
Refills: 0 | Status: DISCONTINUED | OUTPATIENT
Start: 2021-03-16 | End: 2021-03-18 | Stop reason: HOSPADM

## 2021-03-16 RX ORDER — PROMETHAZINE HYDROCHLORIDE 25 MG/1
12.5 TABLET ORAL EVERY 6 HOURS PRN
Status: CANCELLED | OUTPATIENT
Start: 2021-03-16

## 2021-03-16 RX ORDER — PRAVASTATIN SODIUM 20 MG
20 TABLET ORAL NIGHTLY
Status: DISCONTINUED | OUTPATIENT
Start: 2021-03-16 | End: 2021-03-18 | Stop reason: HOSPADM

## 2021-03-16 RX ORDER — POLYETHYLENE GLYCOL 3350 17 G/17G
17 POWDER, FOR SOLUTION ORAL DAILY PRN
Status: DISCONTINUED | OUTPATIENT
Start: 2021-03-16 | End: 2021-03-18 | Stop reason: HOSPADM

## 2021-03-16 RX ORDER — ASPIRIN 81 MG/1
81 TABLET, CHEWABLE ORAL DAILY
Status: DISCONTINUED | OUTPATIENT
Start: 2021-03-16 | End: 2021-03-18 | Stop reason: HOSPADM

## 2021-03-16 RX ORDER — SODIUM CHLORIDE 0.9 % (FLUSH) 0.9 %
10 SYRINGE (ML) INJECTION PRN
Status: DISCONTINUED | OUTPATIENT
Start: 2021-03-16 | End: 2021-03-18 | Stop reason: HOSPADM

## 2021-03-16 RX ORDER — FENTANYL CITRATE 50 UG/ML
INJECTION, SOLUTION INTRAMUSCULAR; INTRAVENOUS PRN
Status: DISCONTINUED | OUTPATIENT
Start: 2021-03-16 | End: 2021-03-16 | Stop reason: SDUPTHER

## 2021-03-16 RX ORDER — SODIUM CHLORIDE 9 MG/ML
INJECTION, SOLUTION INTRAVENOUS CONTINUOUS PRN
Status: DISCONTINUED | OUTPATIENT
Start: 2021-03-16 | End: 2021-03-16 | Stop reason: SDUPTHER

## 2021-03-16 RX ORDER — IBUPROFEN 800 MG/1
800 TABLET ORAL EVERY 6 HOURS PRN
Status: DISCONTINUED | OUTPATIENT
Start: 2021-03-16 | End: 2021-03-18 | Stop reason: HOSPADM

## 2021-03-16 RX ORDER — AMITRIPTYLINE HYDROCHLORIDE 50 MG/1
50 TABLET, FILM COATED ORAL NIGHTLY
COMMUNITY
End: 2021-03-22 | Stop reason: SDUPTHER

## 2021-03-16 RX ORDER — DIPHENHYDRAMINE HYDROCHLORIDE 50 MG/ML
INJECTION INTRAMUSCULAR; INTRAVENOUS
Status: DISPENSED
Start: 2021-03-16 | End: 2021-03-17

## 2021-03-16 RX ADMIN — MIDAZOLAM 2 MG: 1 INJECTION INTRAMUSCULAR; INTRAVENOUS at 13:57

## 2021-03-16 RX ADMIN — AMLODIPINE BESYLATE 5 MG: 5 TABLET ORAL at 21:54

## 2021-03-16 RX ADMIN — INSULIN LISPRO 1 UNITS: 100 INJECTION, SOLUTION INTRAVENOUS; SUBCUTANEOUS at 21:59

## 2021-03-16 RX ADMIN — SODIUM CHLORIDE: 9 INJECTION, SOLUTION INTRAVENOUS at 13:57

## 2021-03-16 RX ADMIN — PRAVASTATIN SODIUM 20 MG: 20 TABLET ORAL at 21:57

## 2021-03-16 RX ADMIN — SODIUM CHLORIDE, PRESERVATIVE FREE 10 ML: 5 INJECTION INTRAVENOUS at 21:57

## 2021-03-16 RX ADMIN — AMITRIPTYLINE HYDROCHLORIDE 50 MG: 25 TABLET, FILM COATED ORAL at 21:56

## 2021-03-16 RX ADMIN — KETOROLAC TROMETHAMINE 30 MG: 30 INJECTION, SOLUTION INTRAMUSCULAR at 17:11

## 2021-03-16 RX ADMIN — FENTANYL CITRATE 25 MCG: 0.05 INJECTION, SOLUTION INTRAMUSCULAR; INTRAVENOUS at 04:45

## 2021-03-16 RX ADMIN — DIPHENHYDRAMINE HYDROCHLORIDE 25 MG: 50 INJECTION, SOLUTION INTRAMUSCULAR; INTRAVENOUS at 23:00

## 2021-03-16 RX ADMIN — HYDROMORPHONE HYDROCHLORIDE 0.5 MG: 1 INJECTION, SOLUTION INTRAMUSCULAR; INTRAVENOUS; SUBCUTANEOUS at 22:05

## 2021-03-16 RX ADMIN — HYDROMORPHONE HYDROCHLORIDE 0.5 MG: 1 INJECTION, SOLUTION INTRAMUSCULAR; INTRAVENOUS; SUBCUTANEOUS at 12:25

## 2021-03-16 RX ADMIN — KETOROLAC TROMETHAMINE 30 MG: 30 INJECTION, SOLUTION INTRAMUSCULAR at 23:14

## 2021-03-16 RX ADMIN — PHENAZOPYRIDINE HYDROCHLORIDE 100 MG: 100 TABLET ORAL at 21:54

## 2021-03-16 RX ADMIN — SODIUM CHLORIDE: 9 INJECTION, SOLUTION INTRAVENOUS at 04:12

## 2021-03-16 RX ADMIN — SODIUM CHLORIDE: 9 INJECTION, SOLUTION INTRAVENOUS at 21:53

## 2021-03-16 RX ADMIN — SODIUM CHLORIDE: 9 INJECTION, SOLUTION INTRAVENOUS at 12:31

## 2021-03-16 RX ADMIN — CEFTRIAXONE SODIUM 1000 MG: 1 INJECTION, POWDER, FOR SOLUTION INTRAMUSCULAR; INTRAVENOUS at 12:28

## 2021-03-16 RX ADMIN — FENTANYL CITRATE 50 MCG: 50 INJECTION, SOLUTION INTRAMUSCULAR; INTRAVENOUS at 14:19

## 2021-03-16 RX ADMIN — FENTANYL CITRATE 50 MCG: 50 INJECTION, SOLUTION INTRAMUSCULAR; INTRAVENOUS at 14:13

## 2021-03-16 RX ADMIN — LISINOPRIL 40 MG: 10 TABLET ORAL at 21:53

## 2021-03-16 RX ADMIN — FENTANYL CITRATE 25 MCG: 50 INJECTION, SOLUTION INTRAMUSCULAR; INTRAVENOUS at 08:25

## 2021-03-16 RX ADMIN — PROPOFOL 75 MCG/KG/MIN: 10 INJECTION, EMULSION INTRAVENOUS at 14:09

## 2021-03-16 RX ADMIN — ASPIRIN 81 MG: 81 TABLET, CHEWABLE ORAL at 17:12

## 2021-03-16 RX ADMIN — ONDANSETRON HYDROCHLORIDE 4 MG: 2 SOLUTION INTRAMUSCULAR; INTRAVENOUS at 04:13

## 2021-03-16 RX ADMIN — KETOROLAC TROMETHAMINE 30 MG: 30 INJECTION, SOLUTION INTRAMUSCULAR at 04:12

## 2021-03-16 RX ADMIN — FENTANYL CITRATE 50 MCG: 50 INJECTION, SOLUTION INTRAMUSCULAR; INTRAVENOUS at 14:09

## 2021-03-16 RX ADMIN — METOPROLOL SUCCINATE 50 MG: 50 TABLET, EXTENDED RELEASE ORAL at 17:12

## 2021-03-16 RX ADMIN — FENTANYL CITRATE 50 MCG: 50 INJECTION, SOLUTION INTRAMUSCULAR; INTRAVENOUS at 14:59

## 2021-03-16 RX ADMIN — HYDROMORPHONE HYDROCHLORIDE 0.5 MG: 1 INJECTION, SOLUTION INTRAMUSCULAR; INTRAVENOUS; SUBCUTANEOUS at 17:53

## 2021-03-16 ASSESSMENT — PAIN SCALES - GENERAL
PAINLEVEL_OUTOF10: 10
PAINLEVEL_OUTOF10: 8
PAINLEVEL_OUTOF10: 5
PAINLEVEL_OUTOF10: 8
PAINLEVEL_OUTOF10: 8
PAINLEVEL_OUTOF10: 3
PAINLEVEL_OUTOF10: 0
PAINLEVEL_OUTOF10: 10
PAINLEVEL_OUTOF10: 10
PAINLEVEL_OUTOF10: 0
PAINLEVEL_OUTOF10: 4
PAINLEVEL_OUTOF10: 6
PAINLEVEL_OUTOF10: 0

## 2021-03-16 ASSESSMENT — PULMONARY FUNCTION TESTS
PIF_VALUE: 0
PIF_VALUE: 1
PIF_VALUE: 0

## 2021-03-16 ASSESSMENT — PAIN DESCRIPTION - LOCATION
LOCATION: ABDOMEN;GROIN
LOCATION: ABDOMEN;PENIS
LOCATION: ABDOMEN;PELVIS
LOCATION: ABDOMEN

## 2021-03-16 ASSESSMENT — PAIN DESCRIPTION - PAIN TYPE
TYPE: ACUTE PAIN
TYPE: SURGICAL PAIN
TYPE: ACUTE PAIN;SURGICAL PAIN
TYPE: ACUTE PAIN;SURGICAL PAIN

## 2021-03-16 ASSESSMENT — PAIN DESCRIPTION - PROGRESSION
CLINICAL_PROGRESSION: GRADUALLY IMPROVING
CLINICAL_PROGRESSION: NOT CHANGED

## 2021-03-16 ASSESSMENT — PAIN DESCRIPTION - DESCRIPTORS
DESCRIPTORS: ACHING;BURNING;CRAMPING
DESCRIPTORS: ACHING;BURNING
DESCRIPTORS: SHARP

## 2021-03-16 NOTE — PROGRESS NOTES
Pt arrived to OR completely clothed from the Er. Pts belongings placed in a patient belonging bag.  Modesto Gold RN

## 2021-03-16 NOTE — CONSULTS
3/16/2021 9:22 AM  Service: Urology  Group: [facility] (Moustapha/Radha/Laura)    Aster Mcgrath  [id number]     Chief Complaint:    13 mm left distal ureteral calculi    History of Present Illness: The patient is a 56 y.o. male patient who presented to the emergency department early this morning with complaints of left flank pain radiating in the left groin that began 1 day prior. He denies any fever or chills. Admits to nausea. He states that he is never seen a urologist in the past.  He does report history of kidney stones many years ago, but believes any processes on his own and never required any surgical intervention. He had CT abdomen pelvis performed that showed a 13 mm left distal ureteral calculi with left hydronephrosis. Past Medical History:   Diagnosis Date    Arthritis     High blood pressure     History of influenza vaccine allergy 11/17/2017    HTN (hypertension)     Hyperlipidemia LDL goal < 100     Insomnia     Lumbosacral radiculopathy-L5 by EMG     epidural spring 2126    Metabolic syndrome X     Nephrolithiasis 7/3/2013    Obesity     Pneumonia 2008    Tinnitus 12/20/2012         Past Surgical History:   Procedure Laterality Date    CHOLECYSTECTOMY  03/2005    lap       Medications Prior to Admission:    Not in a hospital admission. Allergies:    Influenza vaccines and Codeine    Social History:    reports that he has never smoked. He has never used smokeless tobacco. He reports current alcohol use. He reports that he does not use drugs. Family History:   Non-contributory to this Urological problem  family history includes Alcohol Abuse in his mother; Coronary Art Dis in his sister; Diabetes in his father and sister; Heart Disease in his father; Kidney Disease in an other family member; Stroke in his father and sister.     Review of Systems:  Constitutional: No fever or chills   Respiratory: negative for cough and hemoptysis  Cardiovascular: negative for chest pain and dyspnea  Gastrointestinal: Positive for left-sided abdominal pain and nausea  Derm: negative for rash and skin lesion(s)  Neurological: negative for seizures and tremors  Musculoskeletal: Negative    Psychiatric: Negative   : As above in the HPI, otherwise negative  All other reviews are negative    Physical Exam:     Vitals:  BP (!) 163/95   Pulse 80   Temp 97.3 °F (36.3 °C) (Temporal)   Resp 20   Ht 5' 10\" (1.778 m)   Wt (!) 355 lb (161 kg)   SpO2 96%   BMI 50.94 kg/m²     General:  Awake, alert, oriented X 3. No apparent distress. HEENT:  Normocephalic, atraumatic. Lungs:  Respirations symmetric and non-labored. Abdomen:  soft, nontender, no masses  Extremities:  No clubbing, cyanosis, or edema  Skin:  Warm and dry, no open lesions or rashes  Neuro: There are no motor or sensory deficits in the 4 quadrant extremities   Rectal: deferred  Genitourinary:  Deferred, patient in hallway bed     Labs:     Recent Labs     03/16/21  0118   WBC 12.3*   RBC 4.81   HGB 15.6   HCT 45.7   MCV 95.0   MCH 32.4   MCHC 34.1   RDW 11.9      MPV 10.1         Recent Labs     03/16/21  0118   CREATININE 1.2       Imaging:   Narrative   EXAMINATION:   CT OF THE ABDOMEN AND PELVIS WITHOUT CONTRAST 3/16/2021 4:16 am       TECHNIQUE:   CT of the abdomen and pelvis was performed without the administration of   intravenous contrast. Multiplanar reformatted images are provided for review.    Dose modulation, iterative reconstruction, and/or weight based adjustment of   the mA/kV was utilized to reduce the radiation dose to as low as reasonably   achievable.       COMPARISON:   None.       HISTORY:   ORDERING SYSTEM PROVIDED HISTORY: abdominal pain groin pain   TECHNOLOGIST PROVIDED HISTORY:   Reason for exam:->abdominal pain groin pain   Additional Contrast?->None   Decision Support Exception->Emergency Medical Condition (MA)   What reading provider will be dictating this exam?->CRC       FINDINGS:   Lower Chest: The lung bases are unremarkable.       Organs: Hepatic steatosis.  Evidence for cholecystectomy.  Splenomegaly at   13.0 cm.  The adrenal glands, right kidney and pancreas are unremarkable.  13   mm obstructing calculus distal left ureter with severe left hydronephrosis   and perinephric fat stranding.  3 mm calculus inferior pole left kidney.       GI/Bowel: Large and small bowel are unremarkable.       Pelvis: Normal appearing urinary bladder.       Peritoneum/Retroperitoneum: No free air or free fluid.       Bones/Soft Tissues: Fat containing umbilical and supraumbilical hernias. Multilevel degenerative changes thoracolumbar spine.  Bilateral L5 pars   defects with grade 1 anterolisthesis L5 over S1.           Impression   13 mm obstructing calculus distal left ureter with severe left hydronephrosis   and perinephric fat stranding.       3 mm calculus inferior pole left kidney.       Small umbilical and supraumbilical fat containing hernias.       Bilateral L5 pars defects with grade 1 anterolisthesis L5 over S1.       Splenomegaly at 13.0 cm. Assessment/plan:  13mm left distal ureteral calculi   Left hydronephrosis   Additional nonobstructing left renal calculi     Cont to watch the creatinine  UA reviewed  Urine culture ordered  CTAP reviewed, 13mm left distal ureteral calculi with left hydronephrosis and additional nonobstructing left renal calculi  Keep NPO  Will plan to proceed to OR this afternoon for cystoscopy, retrograde pyelogram, possible ureteroscopy, possible laser lithotripsy, and left stent insertion  He consented to the above  Ancef on call to OR  Will follow       Electronically signed by KAMILLE Irving CNP on 3/16/2021 at 9:22 AM         The patient was seen and examined. I have reviewed the medical record in detail. I agree with the plan as outlined by RISHABH Irving.     Shayna Elder MD  11:58 AM  3/16/2021

## 2021-03-16 NOTE — ANESTHESIA PRE PROCEDURE
Department of Anesthesiology  Preprocedure Note       Name:  Oswald Delacruz   Age:  56 y.o.  :  1960                                          MRN:  [id number]         Date:  3/16/2021      Surgeon: Sushant Tomas):  Jyoti Braswell MD    Procedure: Procedure(s):  CYSTOSCOPY RETROGRADE PYELOGRAM, POSSIBLE URETEROSCOPY, POSSIBLE  LASER LITHOTRIPSY POSSIBLE STENT INSERTION    Medications prior to admission:   Prior to Admission medications    Medication Sig Start Date End Date Taking? Authorizing Provider   amitriptyline (ELAVIL) 50 MG tablet Take 50 mg by mouth nightly   Yes Historical Provider, MD   benazepril (LOTENSIN) 40 MG tablet Take 40 mg by mouth daily   Yes Historical Provider, MD   metFORMIN (GLUCOPHAGE) 1000 MG tablet Take 2,000 mg by mouth daily (with breakfast)   Yes Historical Provider, MD   metoprolol succinate (TOPROL XL) 50 MG extended release tablet Take 50 mg by mouth daily   Yes Historical Provider, MD   pravastatin (PRAVACHOL) 20 MG tablet Take 20 mg by mouth nightly   Yes Historical Provider, MD   amLODIPine (NORVASC) 5 MG tablet Take 1 tablet by mouth daily 9/10/20  Yes Shelby Jennings MD   multivitamin [facility]) per tablet Take 1 tablet by mouth daily. Yes Historical Provider, MD   aspirin 81 MG tablet Take 81 mg by mouth daily.     Historical Provider, MD       Current medications:    Current Facility-Administered Medications   Medication Dose Route Frequency Provider Last Rate Last Admin    ondansetron (ZOFRAN) injection 4 mg  4 mg Intravenous Q6H PRN Sunny Pleitez MD   4 mg at 21 0413    ceFAZolin (ANCEF) 2000 mg in sterile water 20 mL IV syringe  2,000 mg Intravenous On Call to [address], APRN - CNP        0.9 % sodium chloride infusion   Intravenous Continuous Dillan Later, APRN -  mL/hr at 21 1231 New Bag at 21 1231    sodium chloride flush 0.9 % injection 10 mL  10 mL Intravenous 2 times per day Dillan Later, APRN - NP        sodium chloride flush 0.9 % injection 10 mL  10 mL Intravenous PRN Myrna Cane, APRN - NP        acetaminophen (TYLENOL) tablet 650 mg  650 mg Oral Q4H PRN Myrna Cane, APRN - NP        polyethylene glycol (GLYCOLAX) packet 17 g  17 g Oral Daily PRN Myrna Cane, APRN - NP        ketorolac (TORADOL) injection 30 mg  30 mg Intravenous Q6H Chrissy D German Balderrama, APRN - NP        ipratropium-albuterol (DUONEB) nebulizer solution 1 ampule  1 ampule Inhalation Q4H PRN Myrna Cane, APRN - NP        amitriptyline (ELAVIL) tablet 50 mg  50 mg Oral Nightly Chrissy D German Balderrama, APRN - NP        amLODIPine (NORVASC) tablet 5 mg  5 mg Oral Daily Chrissy D German Balderrama, APRN - NP        aspirin tablet 81 mg  81 mg Oral Daily Chrissy D German Balderrama, APRN - NP        lisinopril (PRINIVIL;ZESTRIL) tablet 40 mg  40 mg Oral Daily Chrissy D German Balderrama, APRN - NP        metoprolol succinate (TOPROL XL) extended release tablet 50 mg  1 tablet Oral Daily Chrissy D German Balderrama, APRN - NP        pravastatin (PRAVACHOL) tablet 20 mg  20 mg Oral Nightly Chrissy Redell Catena, APRN - NP        insulin lispro (HUMALOG) injection vial 0-6 Units  0-6 Units Subcutaneous TID WC Myrna Cane, APRN - NP   Stopped at 03/16/21 1209    insulin lispro (HUMALOG) injection vial 0-3 Units  0-3 Units Subcutaneous Nightly Chrissy D German Balderrama, APRN - NP        cefTRIAXone (ROCEPHIN) 1,000 mg in sterile water 10 mL IV syringe  1,000 mg Intravenous Q24H Myrna Cane, APRN - NP   1,000 mg at 03/16/21 1228    HYDROmorphone (DILAUDID) injection 0.25 mg  0.25 mg Intravenous Q3H PRN Myrna Cane, APRN - NP        Or    HYDROmorphone (DILAUDID) injection 0.5 mg  0.5 mg Intravenous Q3H PRN Myrna Cane, APRN - NP   0.5 mg at 03/16/21 1225    levothyroxine (SYNTHROID) tablet 50 mcg  50 mcg Oral Daily Chrissy D German Balderrama, APRN - NP         Current Outpatient Medications   Medication Sig Dispense Refill    amitriptyline (ELAVIL) 50 MG tablet Take 50 mg by mouth nightly      benazepril (LOTENSIN) 40 MG tablet Take 40 mg by mouth daily      metFORMIN (GLUCOPHAGE) 1000 MG tablet Take 2,000 mg by mouth daily (with breakfast)      metoprolol succinate (TOPROL XL) 50 MG extended release tablet Take 50 mg by mouth daily      pravastatin (PRAVACHOL) 20 MG tablet Take 20 mg by mouth nightly      amLODIPine (NORVASC) 5 MG tablet Take 1 tablet by mouth daily 90 tablet 1    multivitamin (THERAGRAN) per tablet Take 1 tablet by mouth daily.  aspirin 81 MG tablet Take 81 mg by mouth daily. Allergies:     Allergies   Allergen Reactions    Influenza Vaccines Hives     IMMEDIATE REACTION    Codeine Nausea And Vomiting and Rash       Problem List:    Patient Active Problem List   Diagnosis Code    Plantar fasciitis M72.2    Lumbosacral radiculopathy-L5 by EMG, Epidural spring 2011 M54.17    Spinal stenosis M48.00    Essential hypertension M09    Metabolic syndrome X G49.55    Hyperlipidemia with target LDL less than 100 E78.5    Seborrheic dermatitis L21.9    Mood swings R45.86    Insomnia G47.00    Obesity E66.9    Foot drop M21.379    Tinnitus H93.19    History of nephrolithiasis Z87.442    Allergic rhinitis J30.9    Needs flu shot Z23    Chronic headache R51.9, G89.29    Obstructive sleep apnea syndrome G47.33    Outbursts of anger R45.4    Primary insomnia F51.01    History of influenza vaccine allergy Z88.7    Ureteral stone N20.1       Past Medical History:        Diagnosis Date    Arthritis     High blood pressure     History of influenza vaccine allergy 11/17/2017    HTN (hypertension)     Hyperlipidemia LDL goal < 100     Insomnia     Lumbosacral radiculopathy-L5 by EMG     epidural spring 8122    Metabolic syndrome X     Nephrolithiasis 7/3/2013    Obesity     Pneumonia 2008    Tinnitus 12/20/2012       Past Surgical History:        Procedure Laterality Date    CHOLECYSTECTOMY  03/2005    lap       Social History:    Social History     Tobacco Use    Smoking status: Never Smoker    Smokeless tobacco: Never Used    Tobacco comment: Advised to not start   Substance Use Topics    Alcohol use: Yes     Alcohol/week: 0.0 standard drinks     Comment: 3 beers/9 weeks                                Counseling given: Not Answered  Comment: Advised to not start      Vital Signs (Current):   Vitals:    03/16/21 0046 03/16/21 0110 03/16/21 0413 03/16/21 0614   BP:  (!) 171/102 (!) 175/99 (!) 163/95   Pulse: 84 77 83 80   Resp:  17 20 20   Temp: 36.4 °C (97.5 °F) 36.3 °C (97.3 °F)     TempSrc: Skin Temporal     SpO2: 96% 96% 97% 96%   Weight:  (!) 355 lb (161 kg)     Height:  5' 10\" (1.778 m)                                                BP Readings from Last 3 Encounters:   03/16/21 (!) 163/95   08/20/20 134/70   06/20/19 130/80       NPO Status:                                                                                 BMI:   Wt Readings from Last 3 Encounters:   03/16/21 (!) 355 lb (161 kg)   08/20/20 (!) 349 lb (158.3 kg)   06/20/19 (!) 350 lb (158.8 kg)     Body mass index is 50.94 kg/m². CBC:   Lab Results   Component Value Date    WBC 12.3 03/16/2021    RBC 4.81 03/16/2021    HGB 15.6 03/16/2021    HCT 45.7 03/16/2021    MCV 95.0 03/16/2021    RDW 11.9 03/16/2021     03/16/2021       CMP:   Lab Results   Component Value Date     03/16/2021    K 4.1 03/16/2021     03/16/2021    CO2 20 03/16/2021    BUN 21 03/16/2021    CREATININE 1.2 03/16/2021    GFRAA >60 03/16/2021    GFRAA >60 12/20/2012    AGRATIO 1.5 06/20/2019    LABGLOM >60 03/16/2021    LABGLOM 88.3 06/27/2011    GLUCOSE 207 03/16/2021    GLUCOSE 79 06/27/2011    PROT 7.6 03/16/2021    PROT 6.8 12/20/2012    CALCIUM 9.5 03/16/2021    BILITOT 0.7 03/16/2021    ALKPHOS 53 03/16/2021    AST 28 03/16/2021    ALT 33 03/16/2021       POC Tests: No results for input(s): POCGLU, POCNA, POCK, POCCL, POCBUN, POCHEMO, POCHCT in the last 72 hours.     Coags: No results found for: PROTIME, INR, APTT HCG (If Applicable): No results found for: PREGTESTUR, PREGSERUM, HCG, HCGQUANT     ABGs: No results found for: PHART, PO2ART, HUC4CID, GAF3LWW, BEART, X4ANTSOM     Type & Screen (If Applicable):  No results found for: LABABO, LABRH    Drug/Infectious Status (If Applicable):  No results found for: HIV, HEPCAB    COVID-19 Screening (If Applicable): No results found for: COVID19        Anesthesia Evaluation  Patient summary reviewed and Nursing notes reviewed  Airway: Mallampati: II  TM distance: >3 FB   Neck ROM: full  Mouth opening: > = 3 FB Dental:      Comment: Poor condition    Pulmonary: breath sounds clear to auscultation  (+) sleep apnea:                             Cardiovascular:    (+) hypertension:,         Rhythm: regular  Rate: normal                    Neuro/Psych:   (+) neuromuscular disease:, headaches:,             GI/Hepatic/Renal:   (+) renal disease: kidney stones, morbid obesity          Endo/Other:    (+) DiabetesType II DM, , .                  ROS comment: Metabolic syndrome X Abdominal:   (+) obese,         Vascular: negative vascular ROS. Anesthesia Plan      MAC     ASA 3       Induction: intravenous. Anesthetic plan and risks discussed with patient. Plan discussed with CRNA.                   KAMILLE Warren - DELFIN   3/16/2021

## 2021-03-16 NOTE — H&P
Hospitalist History & Physical      PCP: Robbie Castillo MD    Date of Admission: 3/16/2021    Date of Service: Pt seen/examined on 3/16/2021 and is placed in observation. Chief Complaint:  had concerns including Abdominal Pain (left lower abd and then the left groin, pt states that it started at 1300 today. 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 ). History Of Present Illness:    Mr. Maris Edward, a 56y.o. year old male  who  has a past medical history of Arthritis, High blood pressure, History of influenza vaccine allergy, HTN (hypertension), Hyperlipidemia LDL goal < 100, Insomnia, Lumbosacral radiculopathy-L5 by EMG, Metabolic syndrome X, Nephrolithiasis, Obesity, Pneumonia, and Tinnitus. Patient presented to the ED with severe left-sided abdominal abdominal and groin pain beginning at 1 PM the afternoon prior to arrival.  He states the pain came on suddenly, is constant, severe, worsened and relieved by nothing. He reports no fevers, chills, nausea, or vomiting. He does state that his whole body is cramping. He has a history of kidney stone several years ago that did not need surgically removed. He never saw a urologist.  He states that he has been urinating since this began and the urine looks normal.      ER COURSE:  On arrival he was found to have mild leukocytosis with a white blood cell count of 12.3. Glucose was elevated 207. UA was positive for bilirubin and large amount of blood. CT of the abdomen with a 13 mm obstructing calculus in the left ureter with severe left hydronephrosis and perinephric fat stranding, as well as a nonobstructing 3 mm calculus in the left kidney. Incidental findings of splenomegaly, umbilical hernia, and degenerative spine changes. Urology was consulted and there are plans for OR this afternoon for cystoscopy, retrograde pyelogram, possible ureteroscopy, possible laser lithotripsy, and left stent insertion.   He is being admitted under medical service due to extensive medical history. Past Medical History:        Diagnosis Date    Arthritis     High blood pressure     History of influenza vaccine allergy 11/17/2017    HTN (hypertension)     Hyperlipidemia LDL goal < 100     Insomnia     Lumbosacral radiculopathy-L5 by EMG     epidural spring 2180    Metabolic syndrome X     Nephrolithiasis 7/3/2013    Obesity     Pneumonia 2008    Tinnitus 12/20/2012       Past Surgical History:        Procedure Laterality Date    CHOLECYSTECTOMY  03/2005    lap       Medications Prior to Admission:      Prior to Admission medications    Medication Sig Start Date End Date Taking? Authorizing Provider   benazepril (LOTENSIN) 40 MG tablet TAKE 1 TABLET BY MOUTH ONCE DAILY 3/5/21   Colten Finley MD   metFORMIN (GLUCOPHAGE) 1000 MG tablet TAKE 2 TABLETS BY MOUTH ONCE DAILY WITH BREAKFAST 10/19/20   Misty Mohr MD   etodolac (LODINE) 400 MG tablet TAKE ONE TABLET BY MOUTH TWICE DAILY AS NEEDED 10/19/20   Misty Mohr MD   pravastatin (PRAVACHOL) 20 MG tablet TAKE 1 TABLET BY MOUTH ONCE DAILY IN THE EVENING 9/10/20   Colten Finley MD   amLODIPine (NORVASC) 5 MG tablet Take 1 tablet by mouth daily 9/10/20   Colten Finley MD   metoprolol succinate (TOPROL XL) 50 MG extended release tablet TAKE 1 TABLET BY MOUTH ONCE DAILY 9/10/20   Colten Finley MD   amitriptyline (ELAVIL) 50 MG tablet TAKE 1 TABLET BY MOUTH ONCE DAILY AT NIGHT 9/10/20   Colten Finley MD   fluticasone [facility]) 50 MCG/ACT nasal spray 1 spray by Nasal route daily 10/2/17   Colten Finley MD   albuterol sulfate  (90 BASE) MCG/ACT inhaler Inhale 2 puffs into the lungs every 4 hours as needed for Wheezing May substitute for insurance preferred (Ventolin, Proventil, ProAir) 12/28/15 3/17/26  Colten Finley MD   aspirin 81 MG tablet Take 81 mg by mouth daily. Historical Provider, MD   multivitamin [facility]) per tablet Take 1 tablet by mouth daily.       Historical Provider, MD Chavez (ODORLESS GARLIC) 9539 MG TABS Take 1 tablet by mouth daily. Historical Provider, MD       Allergies:  Influenza vaccines and Codeine    Social History:    RESIDENCE: Private residence  TOBACCO:   reports that he has never smoked. He has never used smokeless tobacco.  ETOH:   reports current alcohol use. Family History:    Reviewed in detail and negative for DM, CAD, Cancer, CVA. Positive as follows\"      Problem Relation Age of Onset    Alcohol Abuse Mother     Diabetes Father     Heart Disease Father     Stroke Father     Kidney Disease Other     Diabetes Sister     Stroke Sister     Coronary Art Dis Sister        REVIEW OF SYSTEMS:   Pertinent positives as noted in the HPI. All other systems reviewed and negative. PHYSICAL EXAM:  BP (!) 163/95   Pulse 80   Temp 97.3 °F (36.3 °C) (Temporal)   Resp 20   Ht 5' 10\" (1.778 m)   Wt (!) 355 lb (161 kg)   SpO2 96%   BMI 50.94 kg/m²      General appearance: Middle aged obese male in no apparent distress, appears stated age and cooperative. HEENT: Normal cephalic, atraumatic without obvious deformity. Pupils equal, round, and reactive to light. Extra ocular muscles intact. Conjunctivae/corneas clear. Neck: Supple, with full range of motion. No jugular venous distention. Trachea midline. Respiratory:  Clear to auscultation bilaterally. No apparent distress. Cardiovascular:  Regular rate and rhythm. S1, S2 without murmurs, rubs, or gallops. PV: Brisk capillary refill. +2 pedal and radial pulses bilaterally. No clubbing, cyanosis, edema of bilateral lower extremities. Abdomen: Soft, obese, diffusely tender. +BS + gaurding  Musculoskeletal: No obvious deformities or erythematous or edematous joints. Skin: Normal skin color. No rashes or lesions. Neurologic:  Neurovascularly intact without any focal sensory/motor deficits.  Cranial nerves: II-XII intact, grossly non-focal.  Psychiatric: Alert and oriented, thought content appropriate, normal insight    Reviewed EKG and CXR personally      CBC:   Recent Labs     03/16/21  0118   WBC 12.3*   RBC 4.81   HGB 15.6   HCT 45.7   MCV 95.0   RDW 11.9        BMP:   Recent Labs     03/16/21  0118      K 4.1      CO2 20*   BUN 21   CREATININE 1.2     LFT:  Recent Labs     03/16/21 0118   PROT 7.6   ALKPHOS 53   ALT 33   AST 28   BILITOT 0.7   LIPASE 27     ESR:   Lab Results   Component Value Date    SEDRATE 24 (H) 07/03/2014     Lactic Acid:   Lab Results   Component Value Date    LACTA 2.1 03/16/2021       Oupatient labs:  Lab Results   Component Value Date    CHOL 143 06/20/2019    TRIG 160 (H) 06/20/2019    HDL 44 06/20/2019    LDLCALC 67 06/20/2019    PSA 0.63 08/16/2018    LABA1C 6.9 08/20/2020     Urinalysis:    Lab Results   Component Value Date    NITRU Negative 03/16/2021    WBCUA 0-1 03/16/2021    BACTERIA RARE 03/16/2021    RBCUA >20 03/16/2021    BLOODU LARGE 03/16/2021    SPECGRAV >=1.030 03/16/2021    GLUCOSEU Negative 03/16/2021       Imaging:  Ct Abdomen Pelvis Wo Contrast Additional Contrast? None    Result Date: 3/16/2021  13 mm obstructing calculus distal left ureter with severe left hydronephrosis and perinephric fat stranding. 3 mm calculus inferior pole left kidney. Small umbilical and supraumbilical fat containing hernias. Bilateral L5 pars defects with grade 1 anterolisthesis L5 over S1. Splenomegaly at 13.0 cm. ASSESSMENT/PLAN:  Left ureteral stone, obstructing urology following, for OR this afternoon. Pain control check uric acid. Severe left hydronephrosis 2/2 to above, monitor renal fxn. Leukocytosis UA negative, urine culture pending. Ceftriaxone IV for now. Hypertension resume home Norvasc, lisinopril, Toprol-XL. Monitor. Diabetes mellitus low-dose sliding scale, monitor BGL. Check A1c  Hyperlipidemia check lipids. Resume home pravastatin.   Obesity encourage weight loss  Asthma without exacerbation    Diet: Diet NPO Effective Now  Code Status:

## 2021-03-16 NOTE — LETTER
[address] [facility]  [address] [phone]  Phone: [phone]  Fax: [phone]             March 17, 2021    Patient: Dereje Mccarthy   YOB: 1960   Date of Visit: 3/16/2021       To Whom It May Concern:    Dereje Mccarthy was seen and treated in our facility  beginning 3/16/2021 until . He may return to work on 03/19/2021 with no restrictions.       Sincerely,       KAMILLE Marquez NP         Signature:__________________________________

## 2021-03-16 NOTE — BRIEF OP NOTE
Brief Postoperative Note      Patient: Fradny Shell  YOB: 1960  MRN: [id number]    Date of Procedure: 3/16/2021    Pre-Op Diagnosis: Left distal ureteral stone    Post-Op Diagnosis: Same       Procedure(s):  CYSTOSCOPY RETROGRADE PYELOGRAM, LEFT URETEROSCOPY, LEFT  LASER LITHOTRIPSY, STENT INSERTION    Surgeon(s):  Chandler Linares MD    Assistant:  * No surgical staff found *    Anesthesia: Monitor Anesthesia Care    Estimated Blood Loss (mL): Minimal    Complications: None    Specimens:   ID Type Source Tests Collected by Time Destination   A : LEFT KIDNEY STONE Tissue Tissue SURGICAL PATHOLOGY Chandler Linares MD 3/16/2021 1503        Implants:  Implant Name Type Inv.  Item Serial No.  Lot No. LRB No. Used Action   STENT URET 7FR L28CM PERCFLX FIRM DUROMETER DBL PGTL TAPR  STENT URET 7FR L28CM PERCFLX FIRM DUROMETER DBL PGTL TAPR  [employer] [facility] UROLOGY- [id number] Left 1 Implanted         Drains: * No LDAs found *    Findings: Stone    Electronically signed by Chandler Linares MD on 3/16/2021 at 3:15 PM

## 2021-03-16 NOTE — ED PROVIDER NOTES
HPI:  3/16/21, Time: 3:50 AM EDT         Bhumi Cazares is a 56 y.o. male presenting to the ED for left-sided abdominal pain and groin pain, beginning hours ago. The complaint has been persistent, moderate in severity, and worsened by nothing. Patient reporting left-sided abdominal pain as well as groin pain that started yesterday afternoon. Patient reporting nausea but no vomiting. Patient reporting no fever no chills he reports no chest pain. He does report chronic back pain. Patient reporting no headache. Patient reporting no fever chills or cough. ROS:   Pertinent positives and negatives are stated within HPI, all other systems reviewed and are negative.  --------------------------------------------- PAST HISTORY ---------------------------------------------  Past Medical History:  has a past medical history of Arthritis, High blood pressure, History of influenza vaccine allergy, HTN (hypertension), Hyperlipidemia LDL goal < 100, Insomnia, Lumbosacral radiculopathy-L5 by EMG, Metabolic syndrome X, Nephrolithiasis, Obesity, Pneumonia, and Tinnitus. Past Surgical History:  has a past surgical history that includes Cholecystectomy (03/2005). Social History:  reports that he has never smoked. He has never used smokeless tobacco. He reports current alcohol use. He reports that he does not use drugs. Family History: family history includes Alcohol Abuse in his mother; Coronary Art Dis in his sister; Diabetes in his father and sister; Heart Disease in his father; Kidney Disease in an other family member; Stroke in his father and sister. The patients home medications have been reviewed.     Allergies: Influenza vaccines and Codeine    ---------------------------------------------------PHYSICAL EXAM--------------------------------------    Constitutional/General: Alert and oriented x3, mild distress  Head: Normocephalic and atraumatic  Eyes: PERRL, EOMI  Mouth: Oropharynx clear, handling secretions, no trismus  Neck: Supple, full ROM, non tender to palpation in the midline, no stridor, no crepitus, no meningeal signs  Pulmonary: Lungs clear to auscultation bilaterally, no wheezes, rales, or rhonchi. Not in respiratory distress  Cardiovascular:  Regular rate. Regular rhythm. No murmurs, gallops, or rubs. 2+ distal pulses  Chest: no chest wall tenderness  Abdomen: Soft. Tender left lower abdomen Non distended. +BS. No rebound, guarding, or rigidity. No pulsatile masses appreciated. Musculoskeletal: Moves all extremities x 4. Warm and well perfused, no clubbing, cyanosis, or edema. Capillary refill <3 seconds  Skin: warm and dry. No rashes. Neurologic: GCS 15, CN 2-12 grossly intact, no focal deficits, symmetric strength 5/5 in the upper and lower extremities bilaterally  Psych: Normal Affect    -------------------------------------------------- RESULTS -------------------------------------------------  I have personally reviewed all laboratory and imaging results for this patient. Results are listed below.      LABS:  Results for orders placed or performed during the hospital encounter of 03/16/21   CBC auto differential   Result Value Ref Range    WBC 12.3 (H) 4.5 - 11.5 E9/L    RBC 4.81 3.80 - 5.80 E12/L    Hemoglobin 15.6 12.5 - 16.5 g/dL    Hematocrit 45.7 37.0 - 54.0 %    MCV 95.0 80.0 - 99.9 fL    MCH 32.4 26.0 - 35.0 pg    MCHC 34.1 32.0 - 34.5 %    RDW 11.9 11.5 - 15.0 fL    Platelets 208 021 - 884 E9/L    MPV 10.1 7.0 - 12.0 fL    Neutrophils % 70.6 43.0 - 80.0 %    Immature Granulocytes % 1.5 0.0 - 5.0 %    Lymphocytes % 16.7 (L) 20.0 - 42.0 %    Monocytes % 8.8 2.0 - 12.0 %    Eosinophils % 1.5 0.0 - 6.0 %    Basophils % 0.9 0.0 - 2.0 %    Neutrophils Absolute 8.67 (H) 1.80 - 7.30 E9/L    Immature Granulocytes # 0.19 E9/L    Lymphocytes Absolute 2.05 1.50 - 4.00 E9/L    Monocytes Absolute 1.08 (H) 0.10 - 0.95 E9/L    Eosinophils Absolute 0.19 0.05 - 0.50 E9/L    Basophils Absolute 0.11 0.00 - 0.20 E9/L   Comprehensive Metabolic Panel   Result Value Ref Range    Sodium 137 132 - 146 mmol/L    Potassium 4.1 3.5 - 5.0 mmol/L    Chloride 101 98 - 107 mmol/L    CO2 20 (L) 22 - 29 mmol/L    Anion Gap 16 7 - 16 mmol/L    Glucose 207 (H) 74 - 99 mg/dL    BUN 21 8 - 23 mg/dL    CREATININE 1.2 0.7 - 1.2 mg/dL    GFR Non-African American >60 >=60 mL/min/1.73    GFR African American >60     Calcium 9.5 8.6 - 10.2 mg/dL    Total Protein 7.6 6.4 - 8.3 g/dL    Albumin 4.0 3.5 - 5.2 g/dL    Total Bilirubin 0.7 0.0 - 1.2 mg/dL    Alkaline Phosphatase 53 40 - 129 U/L    ALT 33 0 - 40 U/L    AST 28 0 - 39 U/L   Lipase   Result Value Ref Range    Lipase 27 13 - 60 U/L   Lactic Acid, Plasma   Result Value Ref Range    Lactic Acid 2.1 0.5 - 2.2 mmol/L   Urinalysis   Result Value Ref Range    Color, UA Yellow Straw/Yellow    Clarity, UA Clear Clear    Glucose, Ur Negative Negative mg/dL    Bilirubin Urine SMALL (A) Negative    Ketones, Urine Negative Negative mg/dL    Specific Gravity, UA >=1.030 1.005 - 1.030    Blood, Urine LARGE (A) Negative    pH, UA 5.5 5.0 - 9.0    Protein, UA Negative Negative mg/dL    Urobilinogen, Urine 0.2 <2.0 E.U./dL    Nitrite, Urine Negative Negative    Leukocyte Esterase, Urine Negative Negative   Microscopic Urinalysis   Result Value Ref Range    WBC, UA 0-1 0 - 5 /HPF    RBC, UA >20 0 - 2 /HPF    Bacteria, UA RARE (A) None Seen /HPF       RADIOLOGY:  Interpreted by Radiologist.  CT ABDOMEN PELVIS WO CONTRAST Additional Contrast? None   Final Result   13 mm obstructing calculus distal left ureter with severe left hydronephrosis   and perinephric fat stranding. 3 mm calculus inferior pole left kidney. Small umbilical and supraumbilical fat containing hernias. Bilateral L5 pars defects with grade 1 anterolisthesis L5 over S1.       Splenomegaly at 13.0 cm.               }      ------------------------- NURSING NOTES AND VITALS REVIEWED ---------------------------   The nursing notes within the ED encounter and vital signs as below have been reviewed by myself. BP (!) 163/95   Pulse 80   Temp 97.3 °F (36.3 °C) (Temporal)   Resp 20   Ht 5' 10\" (1.778 m)   Wt (!) 355 lb (161 kg)   SpO2 96%   BMI 50.94 kg/m²   Oxygen Saturation Interpretation: Normal    The patients available past medical records and past encounters were reviewed. ------------------------------ ED COURSE/MEDICAL DECISION MAKING----------------------  Medications   0.9 % sodium chloride infusion ( Intravenous New Bag 3/16/21 0412)   ondansetron (ZOFRAN) injection 4 mg (4 mg Intravenous Given 3/16/21 0413)   fentaNYL (SUBLIMAZE) injection 25 mcg (has no administration in time range)   ketorolac (TORADOL) injection 30 mg (30 mg Intravenous Given 3/16/21 0412)   fentaNYL (SUBLIMAZE) injection 25 mcg (25 mcg Intravenous Given 3/16/21 5049)             Medical Decision Making:    Presenting here because of left flank pain and abdominal pain. Patient labs noted reviewed he does have noted hematuria. CT abdomen pelvis shows 30 mm kidney stone left distal ureter. Patient medicated for pain here. Patient required another dose of IV pain medications while here in the emergency department. Urology was consulted    Re-Evaluations:             Reevaluate several times here in the emergency department. Patient medicated and had no relief with Toradol. He was medicated with fentanyl x2. Patient made aware of CT findings and plan. Consultations:             Urology consulted    Critical Care: This patient's ED course included: a personal history and physicial eaxmination    This patient has been closely monitored during their ED course. Counseling: The emergency provider has spoken with the patient and discussed todays results, in addition to providing specific details for the plan of care and counseling regarding the diagnosis and prognosis.   Questions are answered at this time and they are agreeable with the plan.       --------------------------------- IMPRESSION AND DISPOSITION ---------------------------------    IMPRESSION  1. Kidney stone        DISPOSITION  Disposition: Disposition pending urology consultation  Patient condition is stable        NOTE: This report was transcribed using voice recognition software.  Every effort was made to ensure accuracy; however, inadvertent computerized transcription errors may be present          Denton De Leon MD  03/16/21 Lula Good MD  03/16/21 3065

## 2021-03-16 NOTE — PROGRESS NOTES
Patient cleaned for moderate amount pink urine. Patient missed the urinal and urine went on the bed. Patient repositioned with 2 assists.

## 2021-03-16 NOTE — PROGRESS NOTES
Floor Called, nurse to nurse given. Spoke with [employer] . Patients test results review, VS reported to receiving nurse. Any and all important information regarding patient disclosed. Patient transferred to floor on bed in stable condition with ancillary staff.

## 2021-03-16 NOTE — LETTER
[address] [place] [address]  Phone: [phone]  Fax: [phone]           March 17, 2021    Patient: Martina Tang   YOB: 1960   Date of Visit: 3/16/2021       To Whom It May Concern:    Martina Tang was seen and treated in our facility  beginning 3/16/2021 until . He may return to work as a  on [id number] no restrictions.       Sincerely,       KAMILLE Ace NP         Signature:__________________________________

## 2021-03-16 NOTE — OP NOTE
[facility] Urology [address]  Urology Post-operative Note    Nolan Vargas  YOB: 1960  1960    Pre-operative Diagnosis: Large left distal ureteral stone    Post-operative Diagnosis:  Same    Procedure: Cystourethroscopy, left semirigid and flexible ureteroscopy with intracorporeal holmium laser lithotripsy, left ureteral stone basketing and extraction, left JJ ureteral stent insertion, rectal exam    Surgeon: Guanakito Villafuerte MD, ALIS Mcgraw    Anesthesia:   [facility]    Estimated Blood Loss:   Minimal    Complications: None    Drains: Left 7 Kosovan by 28 cm JJ ureteral stent with strings attached    Specimen(s): Stone    Clinical Note:   24-year-old male who presented to the ER with left flank pain. He is a  from [facility]. Imaging identified a very large stone in the left distal ureter causing hydronephrosis. He presents for the above listed procedure. The risks and benefits of the procedure including but not limited to infection, bleeding, bladder perforation or injury, possible inability to remove the stone requiring further procedures, possible need for a stent with stent irritation etc. were discussed in detail and he elected to proceed    Operative Description:   He was taken to the operating room placed in the OR table in supine position. He received IV Rocephin and Ancef preoperatively. Following duction of anesthesia he was repositioned into the dorsolithotomy position. Rectal exam reveals a 40 g smooth nonnodular nontender prostate. He has good sphincter tone. He has no rectal masses. His seminal vesicles are normal.  His external genitalia and abdomen were prepped and draped sterilely. A  film KUB showed a large stone in the area of the left distal ureter. No other stones were identified. A 21 Kosovan cystoscope with 30 degree lens was inserted per urethra and into the bladder.   There were no urethral strictures false passages or tumors. The prostatic urethra was patent. Panendoscopic evaluation of the bladder showed no clots stones tumors or foreign bodies. Both ureteral orifice ease were in the usual position on the trigone and there was clear reflux of urine from each. A sensor wire was inserted through the scope and into the left ureteral orifice and up into the left renal pelvis using intraoperative fluoroscopy. This was secured to the surgical drapes as a safety wire. The cystoscope was removed. High-pressure saline irrigation was utilized. A flexible Olympus ureteroscope was advanced per urethra and into the bladder. The left ureter was atraumatically accessed under direct vision alongside the wire. I passed this 3 to 4 cm inside the ureter and encountered a very large ureteral stones filling the lumen. A 365 µm laser fiber was deployed. The stone was fragmented into multiple small pieces with minimal ureteral injury or damage. The ureteroscope was then removed. A semirigid 6 Urdu ureteroscope was advanced per urethra and into the bladder. This too was passed into the ureter without difficulty and atraumatically. A 0 tip nitinol stone basket was then deployed. Multiple passes in and out of the ureter were made removing the stone fragments. This was done until all stone fragments were completely removed. Stones were collected and sent to pathology for analysis. The cystoscope was reassembled and advanced per urethra and into the bladder over the safety wire. A 7 Western Karol by 28 cm JJ ureteral stent was passed over the wire and into the left renal pelvis. The wire was removed. Fluoroscopy confirmed coiling the stent proximally in the left renal pelvis and it was visualized to coil distally in the bladder in good position. The bladder was emptied and the cystoscope was removed. The strings the stent were brought out per urethra and secured to the dorsum of the penis with benzoin and tape.   He tolerated the procedure well and was taken to the PACU in stable condition. His stent may be removed this Friday by pulling on the strings. He will see a urologist in [place] upon return for metabolic evaluation.       Roxann Brock MD  3/16/2021  3:17 PM

## 2021-03-16 NOTE — ED NOTES
Bed:   Expected date:   Expected time:   Means of arrival:   Comments:  triage     Sophie Richardson, RN  03/16/21 0340

## 2021-03-17 VITALS
SYSTOLIC BLOOD PRESSURE: 141 MMHG | DIASTOLIC BLOOD PRESSURE: 71 MMHG | HEIGHT: 70 IN | RESPIRATION RATE: 16 BRPM | HEART RATE: 66 BPM | TEMPERATURE: 98.8 F | OXYGEN SATURATION: 96 % | BODY MASS INDEX: 45.1 KG/M2 | WEIGHT: 315 LBS

## 2021-03-17 LAB
ANION GAP SERPL CALCULATED.3IONS-SCNC: 8 MMOL/L (ref 7–16)
BASOPHILS ABSOLUTE: 0.07 E9/L (ref 0–0.2)
BASOPHILS RELATIVE PERCENT: 0.8 % (ref 0–2)
BUN BLDV-MCNC: 20 MG/DL (ref 8–23)
CALCIUM SERPL-MCNC: 8.7 MG/DL (ref 8.6–10.2)
CHLORIDE BLD-SCNC: 105 MMOL/L (ref 98–107)
CO2: 24 MMOL/L (ref 22–29)
CREAT SERPL-MCNC: 0.9 MG/DL (ref 0.7–1.2)
EOSINOPHILS ABSOLUTE: 0.34 E9/L (ref 0.05–0.5)
EOSINOPHILS RELATIVE PERCENT: 3.9 % (ref 0–6)
GFR AFRICAN AMERICAN: >60
GFR NON-AFRICAN AMERICAN: >60 ML/MIN/1.73
GLUCOSE BLD-MCNC: 135 MG/DL (ref 74–99)
HCT VFR BLD CALC: 41.8 % (ref 37–54)
HEMOGLOBIN: 13.9 G/DL (ref 12.5–16.5)
IMMATURE GRANULOCYTES #: 0.11 E9/L
IMMATURE GRANULOCYTES %: 1.3 % (ref 0–5)
LYMPHOCYTES ABSOLUTE: 1.87 E9/L (ref 1.5–4)
LYMPHOCYTES RELATIVE PERCENT: 21.7 % (ref 20–42)
MCH RBC QN AUTO: 32.6 PG (ref 26–35)
MCHC RBC AUTO-ENTMCNC: 33.3 % (ref 32–34.5)
MCV RBC AUTO: 97.9 FL (ref 80–99.9)
METER GLUCOSE: 133 MG/DL (ref 74–99)
METER GLUCOSE: 152 MG/DL (ref 74–99)
MONOCYTES ABSOLUTE: 0.84 E9/L (ref 0.1–0.95)
MONOCYTES RELATIVE PERCENT: 9.7 % (ref 2–12)
NEUTROPHILS ABSOLUTE: 5.39 E9/L (ref 1.8–7.3)
NEUTROPHILS RELATIVE PERCENT: 62.6 % (ref 43–80)
PDW BLD-RTO: 11.9 FL (ref 11.5–15)
PLATELET # BLD: 240 E9/L (ref 130–450)
PMV BLD AUTO: 10.3 FL (ref 7–12)
POTASSIUM REFLEX MAGNESIUM: 4.2 MMOL/L (ref 3.5–5)
RBC # BLD: 4.27 E12/L (ref 3.8–5.8)
SODIUM BLD-SCNC: 137 MMOL/L (ref 132–146)
URIC ACID, SERUM: 5.6 MG/DL (ref 3.4–7)
WBC # BLD: 8.6 E9/L (ref 4.5–11.5)

## 2021-03-17 PROCEDURE — G0378 HOSPITAL OBSERVATION PER HR: HCPCS

## 2021-03-17 PROCEDURE — 2580000003 HC RX 258: Performed by: UROLOGY

## 2021-03-17 PROCEDURE — 80048 BASIC METABOLIC PNL TOTAL CA: CPT

## 2021-03-17 PROCEDURE — 84550 ASSAY OF BLOOD/URIC ACID: CPT

## 2021-03-17 PROCEDURE — 82962 GLUCOSE BLOOD TEST: CPT

## 2021-03-17 PROCEDURE — 96376 TX/PRO/DX INJ SAME DRUG ADON: CPT

## 2021-03-17 PROCEDURE — 6370000000 HC RX 637 (ALT 250 FOR IP): Performed by: NURSE PRACTITIONER

## 2021-03-17 PROCEDURE — 6370000000 HC RX 637 (ALT 250 FOR IP): Performed by: UROLOGY

## 2021-03-17 PROCEDURE — 85025 COMPLETE CBC W/AUTO DIFF WBC: CPT

## 2021-03-17 PROCEDURE — 6360000002 HC RX W HCPCS: Performed by: UROLOGY

## 2021-03-17 PROCEDURE — 96375 TX/PRO/DX INJ NEW DRUG ADDON: CPT

## 2021-03-17 PROCEDURE — 36415 COLL VENOUS BLD VENIPUNCTURE: CPT

## 2021-03-17 PROCEDURE — 51798 US URINE CAPACITY MEASURE: CPT

## 2021-03-17 RX ORDER — PHENAZOPYRIDINE HYDROCHLORIDE 100 MG/1
100 TABLET, FILM COATED ORAL 3 TIMES DAILY PRN
Qty: 9 TABLET | Refills: 0 | Status: SHIPPED | OUTPATIENT
Start: 2021-03-17 | End: 2021-03-20

## 2021-03-17 RX ORDER — TAMSULOSIN HYDROCHLORIDE 0.4 MG/1
0.4 CAPSULE ORAL DAILY
Qty: 30 CAPSULE | Refills: 0 | Status: SHIPPED | OUTPATIENT
Start: 2021-03-18 | End: 2021-04-07 | Stop reason: ALTCHOICE

## 2021-03-17 RX ORDER — IBUPROFEN 800 MG/1
400 TABLET ORAL EVERY 8 HOURS PRN
Qty: 120 TABLET | Refills: 0 | Status: SHIPPED | OUTPATIENT
Start: 2021-03-17

## 2021-03-17 RX ORDER — TAMSULOSIN HYDROCHLORIDE 0.4 MG/1
0.4 CAPSULE ORAL DAILY
Status: DISCONTINUED | OUTPATIENT
Start: 2021-03-17 | End: 2021-03-18 | Stop reason: HOSPADM

## 2021-03-17 RX ADMIN — HYDROMORPHONE HYDROCHLORIDE 0.5 MG: 1 INJECTION, SOLUTION INTRAMUSCULAR; INTRAVENOUS; SUBCUTANEOUS at 04:41

## 2021-03-17 RX ADMIN — LISINOPRIL 40 MG: 10 TABLET ORAL at 09:27

## 2021-03-17 RX ADMIN — KETOROLAC TROMETHAMINE 30 MG: 30 INJECTION, SOLUTION INTRAMUSCULAR at 18:34

## 2021-03-17 RX ADMIN — PHENAZOPYRIDINE HYDROCHLORIDE 100 MG: 100 TABLET ORAL at 09:27

## 2021-03-17 RX ADMIN — CEFTRIAXONE SODIUM 1000 MG: 1 INJECTION, POWDER, FOR SOLUTION INTRAMUSCULAR; INTRAVENOUS at 10:52

## 2021-03-17 RX ADMIN — KETOROLAC TROMETHAMINE 30 MG: 30 INJECTION, SOLUTION INTRAMUSCULAR at 06:38

## 2021-03-17 RX ADMIN — TAMSULOSIN HYDROCHLORIDE 0.4 MG: 0.4 CAPSULE ORAL at 09:27

## 2021-03-17 RX ADMIN — HYDROMORPHONE HYDROCHLORIDE 0.5 MG: 1 INJECTION, SOLUTION INTRAMUSCULAR; INTRAVENOUS; SUBCUTANEOUS at 01:26

## 2021-03-17 RX ADMIN — HYDROMORPHONE HYDROCHLORIDE 0.5 MG: 1 INJECTION, SOLUTION INTRAMUSCULAR; INTRAVENOUS; SUBCUTANEOUS at 10:52

## 2021-03-17 RX ADMIN — ASPIRIN 81 MG: 81 TABLET, CHEWABLE ORAL at 09:28

## 2021-03-17 RX ADMIN — KETOROLAC TROMETHAMINE 30 MG: 30 INJECTION, SOLUTION INTRAMUSCULAR at 12:57

## 2021-03-17 RX ADMIN — HYDROMORPHONE HYDROCHLORIDE 0.5 MG: 1 INJECTION, SOLUTION INTRAMUSCULAR; INTRAVENOUS; SUBCUTANEOUS at 07:49

## 2021-03-17 RX ADMIN — INSULIN LISPRO 1 UNITS: 100 INJECTION, SOLUTION INTRAVENOUS; SUBCUTANEOUS at 09:28

## 2021-03-17 RX ADMIN — AMLODIPINE BESYLATE 5 MG: 5 TABLET ORAL at 09:27

## 2021-03-17 RX ADMIN — METOPROLOL SUCCINATE 50 MG: 50 TABLET, EXTENDED RELEASE ORAL at 09:27

## 2021-03-17 RX ADMIN — IBUPROFEN 800 MG: 800 TABLET, FILM COATED ORAL at 16:21

## 2021-03-17 ASSESSMENT — PAIN DESCRIPTION - FREQUENCY
FREQUENCY: CONTINUOUS
FREQUENCY: CONTINUOUS

## 2021-03-17 ASSESSMENT — PAIN SCALES - GENERAL
PAINLEVEL_OUTOF10: 0
PAINLEVEL_OUTOF10: 5
PAINLEVEL_OUTOF10: 3
PAINLEVEL_OUTOF10: 3
PAINLEVEL_OUTOF10: 7
PAINLEVEL_OUTOF10: 6

## 2021-03-17 ASSESSMENT — PAIN DESCRIPTION - DESCRIPTORS
DESCRIPTORS: CONSTANT;SORE;DISCOMFORT
DESCRIPTORS: SORE;ACHING;DISCOMFORT
DESCRIPTORS: CONSTANT;SORE;STABBING
DESCRIPTORS: CONSTANT;DISCOMFORT;STABBING

## 2021-03-17 ASSESSMENT — PAIN - FUNCTIONAL ASSESSMENT
PAIN_FUNCTIONAL_ASSESSMENT: PREVENTS OR INTERFERES SOME ACTIVE ACTIVITIES AND ADLS
PAIN_FUNCTIONAL_ASSESSMENT: PREVENTS OR INTERFERES SOME ACTIVE ACTIVITIES AND ADLS

## 2021-03-17 ASSESSMENT — PAIN DESCRIPTION - PAIN TYPE
TYPE: SURGICAL PAIN
TYPE: SURGICAL PAIN

## 2021-03-17 ASSESSMENT — PAIN DESCRIPTION - ORIENTATION: ORIENTATION: LEFT;LOWER

## 2021-03-17 ASSESSMENT — PAIN DESCRIPTION - ONSET
ONSET: ON-GOING
ONSET: ON-GOING

## 2021-03-17 ASSESSMENT — PAIN DESCRIPTION - LOCATION: LOCATION: ABDOMEN

## 2021-03-17 ASSESSMENT — PAIN DESCRIPTION - PROGRESSION
CLINICAL_PROGRESSION: GRADUALLY IMPROVING
CLINICAL_PROGRESSION: NOT CHANGED

## 2021-03-17 NOTE — PROGRESS NOTES
[facility]  (Moustapha/ Radha/Laura)      PROGRESS NOTE         PATIENT NAME: Analilia Doll   YOB: 1960  ADMISSION DATE: 3/16/2021  3:45 AM   TODAY'S DATE: 3/17/2021        Subjective       Pt feeling well. No issues. String on a stent. Objective     VS:   BP (!) 150/89   Pulse 61   Temp 97.4 °F (36.3 °C) (Temporal)   Resp 18   Ht 5' 10\" (1.778 m)   Wt (!) 355 lb (161 kg)   SpO2 96%   BMI 50.94 kg/m²     I & O - 24hr:    Intake/Output Summary (Last 24 hours) at 3/17/2021 1322  Last data filed at 3/17/2021 8724  Gross per 24 hour   Intake 2778 ml   Output 1200 ml   Net 1578 ml         Physical Exam:  General:  Neck:  Resp:  Abdomen:   No acute distress  Supple  Normal effort  Soft, non-tender, nondistended   :  String on stent. Skin: Skin color, texture, turgor normal, no rashes or lesions       Labs and Imaging Studies   Labs:    CBC:   Recent Labs     03/16/21  0118 03/17/21  0522   WBC 12.3* 8.6   HGB 15.6 13.9   HCT 45.7 41.8   MCV 95.0 97.9    240     BMP:  Recent Labs     03/16/21  0118 03/17/21  0522    137   K 4.1 4.2    105   CO2 20* 24   BUN 21 20   CREATININE 1.2 0.9       Magnesium: No results found for: MG   Phosphate: No results found for: PHOS  PT/INR: No results for input(s): PROTIME, INR in the last 72 hours. U/A:   Lab Results   Component Value Date    LEUKOCYTESUR Negative 03/16/2021    PHUR 5.5 03/16/2021    WBCUA 0-1 03/16/2021    RBCUA >20 03/16/2021    BACTERIA RARE 03/16/2021    SPECGRAV >=1.030 03/16/2021    BLOODU LARGE 03/16/2021    GLUCOSEU Negative 03/16/2021       Urine Culture:       Component Value Date/Time    LABURIN Growth not present, incubation continues 03/16/2021 0159        Blood Culture:     Imaging Studies:          Assessment and Plan       ASSESMENT:  57 y/o M S/p Left J FABRIZIO, stent. PLAN:  Remove stent on Friday. Will follow up in at home as he is a . Continue flomax.    No need for further intervention. Call with questions. Stable for DC. Follow up as needed.        Geneva Alvarez MD  [facility]  3/17/2021  1:22 PM

## 2021-03-17 NOTE — ANESTHESIA POSTPROCEDURE EVALUATION
Department of Anesthesiology  Postprocedure Note    Patient: Maris Edward  MRN: [id number]  YOB: 1960  Date of evaluation: 3/16/2021  Time:  8:59 PM     Procedure Summary     Date: 03/16/21 Room / Location: [facility] OR 11 / [facility]    Anesthesia Start: 7957 Anesthesia Stop: 1518    Procedure: CYSTOSCOPY RETROGRADE PYELOGRAM, LEFT URETEROSCOPY, LEFT  LASER LITHOTRIPSY, STENT INSERTION (Left Ureter) Diagnosis: (.)    Surgeons: Roxann Brock MD Responsible Provider: Wilfredo Tyson MD    Anesthesia Type: MAC ASA Status: 3          Anesthesia Type: MAC    Durga Phase I: Durga Score: 10    Durga Phase II:      Last vitals: Reviewed and per EMR flowsheets.        Anesthesia Post Evaluation    Patient location during evaluation: PACU  Patient participation: complete - patient participated  Level of consciousness: awake  Airway patency: patent  Nausea & Vomiting: no nausea and no vomiting  Complications: no  Cardiovascular status: hemodynamically stable  Respiratory status: acceptable  Hydration status: stable

## 2021-03-17 NOTE — CARE COORDINATION
Spoke with the pt at the bedside to discuss transition of care. [place] an [place] . His home is near [place]. He will be staying at [address] ([address] He will get there via taxi. He is interested in Meds-to-Beds-enrolled. Will follow.  sJ Rocha RN

## 2021-03-17 NOTE — DISCHARGE SUMMARY
Hospitalist Discharge Summary    Patient ID: Triny Lizama   Patient : 1960  Patient's PCP: Lolita Espinal MD    Admit Date: 3/16/2021   Admitting Physician: Eze Cary MD    Discharge Date:  3/17/2021   Discharge Physician: KAMILLE Parmar NP   Discharge Condition: Stable  Discharge Disposition: [facility] course in brief:  (Please refer to daily progress notes for a comprehensive review of the hospitalization by requesting medical records)    Mr. Triny Lizama, a 56y.o. year old male presented to the ED with severe left-sided abdominal abdominal and groin pain beginning at 1 PM the afternoon prior to arrival.  He states the pain came on suddenly, is constant, severe, worsened and relieved by nothing. He reports no fevers, chills, nausea, or vomiting. CT of the abdomen with evidence of a 13 mm obstructing calculus in the left ureter with severe left hydronephrosis and perinephric fat stranding. His creatinine was 1.2. He had mild leukocytosis with a WBC count of 12.3. Urology was consulted and he went to OR for a cysto with lithotripsy and ureteral stent placement. He is to have stent removed Friday by pulling strings. Today his renal function has improved and his WBC count is down to 8.6. He is stable for discharge home with outpatient follow up. Consults:   IP CONSULT TO UROLOGY  IP CONSULT TO INTERNAL MEDICINE    Discharge Diagnoses:  Left ureteral stone, obstructing s/p cysto with lithotripsy and ureteral stent placement  Severe left hydronephrosis   Hypertension  Diabetes mellitus   Hyperlipidemia   Obesity   Asthma     Discharge Instructions / Follow up: Follow up with PCP within one week of discharge.   Follow up with urologist in [place] per direction of Dr. Vianey Huynh   Date Time Provider Pradeep Harvey   3/22/2021  9:40 AM Krystle Figueroa  LEO   3/26/2021 10:00 AM Krystle Figueroa [facility] 7/12/2021  9:00 AM Maria Esther Ybarra MD [facility]       The patient's condition is stable. At this time the patient is without objective evidence of an acute process requiring continuing hospitalization or inpatient management. They are stable for discharge with outpatient follow-up. I have spoken with the patient and discussed the results of the current hospitalization, in addition to providing specific details for the plan of care and counseling regarding the diagnosis and prognosis. The plan has been discussed in detail and they are aware of the specific conditions for emergent return, as well as the importance of follow-up. Their questions are answered at this time and they are agreeable with the plan for discharge home.     Continued appropriate risk factor modification of blood pressure, diabetes and serum lipids will remain essential to reducing risk of future atherosclerotic development    Activity: activity as tolerated    Significant labs:  CBC:   Recent Labs     03/16/21 0118 03/17/21 0522   WBC 12.3* 8.6   RBC 4.81 4.27   HGB 15.6 13.9   HCT 45.7 41.8   MCV 95.0 97.9   RDW 11.9 11.9    240     BMP:   Recent Labs     03/16/21 0118 03/17/21 0522    137   K 4.1 4.2    105   CO2 20* 24   BUN 21 20   CREATININE 1.2 0.9     LFT:  Recent Labs     03/16/21 0118   PROT 7.6   ALKPHOS 53   ALT 33   AST 28   BILITOT 0.7   LIPASE 27     Hgb A1C:   Lab Results   Component Value Date    LABA1C 6.5 (H) 03/16/2021     Thyroid Studies:   Lab Results   Component Value Date    TSH 10.180 (H) 03/16/2021       Urinalysis:    Lab Results   Component Value Date    NITRU Negative 03/16/2021    WBCUA 0-1 03/16/2021    BACTERIA RARE 03/16/2021    RBCUA >20 03/16/2021    BLOODU LARGE 03/16/2021    SPECGRAV >=1.030 03/16/2021    GLUCOSEU Negative 03/16/2021       Imaging:  Ct Abdomen Pelvis Wo Contrast Additional Contrast? None    Result Date: 3/16/2021  EXAMINATION: CT OF THE ABDOMEN AND PELVIS WITHOUT CONTRAST 3/16/2021 4:16 am TECHNIQUE: CT of the abdomen and pelvis was performed without the administration of intravenous contrast. Multiplanar reformatted images are provided for review. Dose modulation, iterative reconstruction, and/or weight based adjustment of the mA/kV was utilized to reduce the radiation dose to as low as reasonably achievable. COMPARISON: None. HISTORY: ORDERING SYSTEM PROVIDED HISTORY: abdominal pain groin pain TECHNOLOGIST PROVIDED HISTORY: Reason for exam:->abdominal pain groin pain Additional Contrast?->None Decision Support Exception->Emergency Medical Condition (MA) What reading provider will be dictating this exam?->CRC FINDINGS: Lower Chest: The lung bases are unremarkable. Organs: Hepatic steatosis. Evidence for cholecystectomy. Splenomegaly at 13.0 cm. The adrenal glands, right kidney and pancreas are unremarkable. 13 mm obstructing calculus distal left ureter with severe left hydronephrosis and perinephric fat stranding. 3 mm calculus inferior pole left kidney. GI/Bowel: Large and small bowel are unremarkable. Pelvis: Normal appearing urinary bladder. Peritoneum/Retroperitoneum: No free air or free fluid. Bones/Soft Tissues: Fat containing umbilical and supraumbilical hernias. Multilevel degenerative changes thoracolumbar spine. Bilateral L5 pars defects with grade 1 anterolisthesis L5 over S1.     13 mm obstructing calculus distal left ureter with severe left hydronephrosis and perinephric fat stranding. 3 mm calculus inferior pole left kidney. Small umbilical and supraumbilical fat containing hernias. Bilateral L5 pars defects with grade 1 anterolisthesis L5 over S1. Splenomegaly at 13.0 cm. Fluoro For Surgical Procedures    Result Date: 3/16/2021  EXAMINATION: SPOT FLUOROSCOPIC IMAGES 3/16/2021 12:10 pm TECHNIQUE: Fluoroscopy was provided by the radiology department for procedure. Radiologist was not present during examination.  FLUOROSCOPY DOSE AND TYPE OR TIME AND EXPOSURES: 19.51 mGy. 0.9 minutes. COMPARISON: CT abdomen/pelvis without contrast 03/16/2021 HISTORY: ORDERING SYSTEM PROVIDED HISTORY: Cysto TECHNOLOGIST PROVIDED HISTORY: Reason for exam:->Cysto What reading provider will be dictating this exam?->CRC Intraprocedural imaging. FINDINGS: 8 spot images of the abdomen were obtained. Images demonstrate placement of a left ureteral stent. Intraprocedural fluoroscopic spot images as above. See separate procedure report for more information. Discharge Medications:      Medication List      START taking these medications    ibuprofen 800 MG tablet  Commonly known as: ADVIL;MOTRIN  Take 0.5 tablets by mouth every 8 hours as needed for Pain     phenazopyridine 100 MG tablet  Commonly known as: PYRIDIUM  Take 1 tablet by mouth 3 times daily as needed (DYSURIA)     tamsulosin 0.4 MG capsule  Commonly known as: FLOMAX  Take 1 capsule by mouth daily  Start taking on: March 18, 2021        CONTINUE taking these medications    amitriptyline 50 MG tablet  Commonly known as: ELAVIL     amLODIPine 5 MG tablet  Commonly known as: NORVASC  Take 1 tablet by mouth daily     aspirin 81 MG tablet     benazepril 40 MG tablet  Commonly known as: LOTENSIN     metFORMIN 1000 MG tablet  Commonly known as: GLUCOPHAGE     metoprolol succinate 50 MG extended release tablet  Commonly known as: TOPROL XL     multivitamin per tablet     pravastatin 20 MG tablet  Commonly known as: PRAVACHOL           Where to Get Your Medications      These medications were sent to [facility], [address] [place] [address]    Phone: [phone]   · ibuprofen 800 MG tablet  · phenazopyridine 100 MG tablet  · tamsulosin 0.4 MG capsule         Time Spent on discharge is more than 45 minutes in the examination, evaluation, counseling and review of medications and discharge plan. +++++++++++++++++++++++++++++++++++++++++++++++++  DANTE Escobar/ [place] [address]  +++++++++++++++++++++++++++++++++++++++++++++++++  NOTE: This report was transcribed using voice recognition software. Every effort was made to ensure accuracy; however, inadvertent computerized transcription errors may be present.

## 2021-03-17 NOTE — PROGRESS NOTES
distress. Cardiovascular:  Regular rate and rhythm. S1, S2 without murmurs, rubs, or gallops. PV: Brisk capillary refill. +2 pedal and radial pulses bilaterally. No clubbing, cyanosis, edema of bilateral lower extremities. Abdomen: Soft, obese, diffusely tender. +BS + gaurding  Musculoskeletal: No obvious deformities or erythematous or edematous joints. Skin: Normal skin color. No rashes or lesions. Neurologic:  Neurovascularly intact without any focal sensory/motor deficits.  Cranial nerves: II-XII intact, grossly non-focal.  Psychiatric: Alert and oriented, thought content appropriate, normal insight    Medications:  REVIEWED DAILY    Infusion Medications    sodium chloride 100 mL/hr at 03/16/21 2153     Scheduled Medications    sodium chloride flush  10 mL Intravenous 2 times per day    ketorolac  30 mg Intravenous Q6H    amitriptyline  50 mg Oral Nightly    amLODIPine  5 mg Oral Daily    aspirin  81 mg Oral Daily    lisinopril  40 mg Oral Daily    metoprolol succinate  50 mg Oral Daily    pravastatin  20 mg Oral Nightly    insulin lispro  0-6 Units Subcutaneous TID WC    insulin lispro  0-3 Units Subcutaneous Nightly    cefTRIAXone (ROCEPHIN) IV  1,000 mg Intravenous Q24H    levothyroxine  50 mcg Oral Daily    diphenhydrAMINE         PRN Meds: ondansetron, sodium chloride flush, acetaminophen, polyethylene glycol, ipratropium-albuterol, HYDROmorphone **OR** HYDROmorphone, ibuprofen, phenazopyridine, white petrolatum, diphenhydrAMINE    Labs:     Recent Labs     03/16/21 0118 03/17/21  0522   WBC 12.3* 8.6   HGB 15.6 13.9   HCT 45.7 41.8    240       Recent Labs     03/16/21 0118 03/17/21  0522    137   K 4.1 4.2    105   CO2 20* 24   BUN 21 20   CREATININE 1.2 0.9   CALCIUM 9.5 8.7       Recent Labs     03/16/21  0118   PROT 7.6   ALKPHOS 53   ALT 33   AST 28   BILITOT 0.7   LIPASE 27       Chronic labs:    Lab Results   Component Value Date    CHOL 144 03/16/2021 TRIG 141 03/16/2021    HDL 43 03/16/2021    LDLCALC 73 03/16/2021    TSH 10.180 (H) 03/16/2021    PSA 0.63 08/16/2018    LABA1C 6.5 (H) 03/16/2021       Radiology: REVIEWED DAILY    ASSESSMENT/PLAN:  Left ureteral stone, obstructing s/p cysto with lithotripsy and ureteral stent placement  Severe left hydronephrosis 2/2 to above. Renal function improved. Dysuria  PRN pyridium. Urinary hestiancy Check post void bladder scan. Flomax. Hypertension  controlled  Diabetes mellitus controlled. LDSS while inpatient. Hyperlipidemia Controlled  Obesity encourage weight loss  Asthma without exacerbation  Subclinical hypothyroidism Recommend OP monitoring    DISPOSITION: Home     +++++++++++++++++++++++++++++++++++++++++++++++++  DANTE Martinez/ [place] [address]  +++++++++++++++++++++++++++++++++++++++++++++++++  NOTE: This report was transcribed using voice recognition software. Every effort was made to ensure accuracy; however, inadvertent computerized transcription errors may be present.

## 2021-03-17 NOTE — PROGRESS NOTES
CLINICAL PHARMACY NOTE: MEDS TO [address] [place] [address] Drive Select Patient?: No  Total # of Prescriptions Filled: 3   The following medications were delivered to the patient:  · PYRIDIUM 100 MG   · FLOMAX 0.4 MG   · IBUPROFEN 800 MG   Total # of Interventions Completed: 3  Time Spent (min): 15    Additional Documentation:

## 2021-03-18 LAB — URINE CULTURE, ROUTINE: NORMAL

## 2021-03-21 LAB
CALCULI COMPOSITION: NORMAL
MASS: 4 MG
STONE DESCRIPTION: NORMAL
STONE NUMBER: 2
STONE SIZE: NORMAL MM

## 2021-03-22 ENCOUNTER — VIRTUAL VISIT (OUTPATIENT)
Dept: FAMILY MEDICINE CLINIC | Age: 61
End: 2021-03-22
Payer: COMMERCIAL

## 2021-03-22 DIAGNOSIS — E11.9 CONTROLLED TYPE 2 DIABETES MELLITUS WITHOUT COMPLICATION, WITHOUT LONG-TERM CURRENT USE OF INSULIN (HCC): ICD-10-CM

## 2021-03-22 DIAGNOSIS — N20.0 KIDNEY STONE: Primary | ICD-10-CM

## 2021-03-22 DIAGNOSIS — E78.5 HYPERLIPIDEMIA WITH TARGET LDL LESS THAN 100: ICD-10-CM

## 2021-03-22 DIAGNOSIS — I10 ESSENTIAL HYPERTENSION: ICD-10-CM

## 2021-03-22 PROCEDURE — 3044F HG A1C LEVEL LT 7.0%: CPT | Performed by: NURSE PRACTITIONER

## 2021-03-22 PROCEDURE — 3017F COLORECTAL CA SCREEN DOC REV: CPT | Performed by: NURSE PRACTITIONER

## 2021-03-22 PROCEDURE — G8483 FLU IMM NO ADMIN DOC REA: HCPCS | Performed by: NURSE PRACTITIONER

## 2021-03-22 PROCEDURE — G8427 DOCREV CUR MEDS BY ELIG CLIN: HCPCS | Performed by: NURSE PRACTITIONER

## 2021-03-22 PROCEDURE — 1036F TOBACCO NON-USER: CPT | Performed by: NURSE PRACTITIONER

## 2021-03-22 PROCEDURE — G8417 CALC BMI ABV UP PARAM F/U: HCPCS | Performed by: NURSE PRACTITIONER

## 2021-03-22 PROCEDURE — 99214 OFFICE O/P EST MOD 30 MIN: CPT | Performed by: NURSE PRACTITIONER

## 2021-03-22 PROCEDURE — 2022F DILAT RTA XM EVC RTNOPTHY: CPT | Performed by: NURSE PRACTITIONER

## 2021-03-22 RX ORDER — METOPROLOL SUCCINATE 50 MG/1
50 TABLET, EXTENDED RELEASE ORAL DAILY
Qty: 90 TABLET | Refills: 1 | Status: SHIPPED | OUTPATIENT
Start: 2021-03-22 | End: 2021-10-13 | Stop reason: SDUPTHER

## 2021-03-22 RX ORDER — AMLODIPINE BESYLATE 5 MG/1
5 TABLET ORAL DAILY
Qty: 90 TABLET | Refills: 1 | Status: SHIPPED | OUTPATIENT
Start: 2021-03-22 | End: 2021-11-18 | Stop reason: SDUPTHER

## 2021-03-22 RX ORDER — AMITRIPTYLINE HYDROCHLORIDE 50 MG/1
50 TABLET, FILM COATED ORAL NIGHTLY
Qty: 90 TABLET | Refills: 1 | Status: SHIPPED | OUTPATIENT
Start: 2021-03-22 | End: 2021-11-23 | Stop reason: SDUPTHER

## 2021-03-22 RX ORDER — PRAVASTATIN SODIUM 20 MG
20 TABLET ORAL NIGHTLY
Qty: 90 TABLET | Refills: 1 | Status: SHIPPED | OUTPATIENT
Start: 2021-03-22 | End: 2021-11-23 | Stop reason: SDUPTHER

## 2021-03-22 ASSESSMENT — PATIENT HEALTH QUESTIONNAIRE - PHQ9
SUM OF ALL RESPONSES TO PHQ QUESTIONS 1-9: 0
SUM OF ALL RESPONSES TO PHQ QUESTIONS 1-9: 0
1. LITTLE INTEREST OR PLEASURE IN DOING THINGS: 0
2. FEELING DOWN, DEPRESSED OR HOPELESS: 0

## 2021-03-22 ASSESSMENT — ENCOUNTER SYMPTOMS: COUGH: 0

## 2021-03-22 NOTE — PROGRESS NOTES
3/22/2021    TELEHEALTH EVALUATION -- Audio/Visual (During [id number] public health emergency)    HPI:    Judyth Siemens (:  1960) has requested an audio/video evaluation for the following concern(s):  Chief Complaint   Patient presents [place]   [address] [place] [facility] [place]16-3/18 kidney stones. doing good today     Patient is completing video visit for hospital f/u from 3/16-3/17. Patient reports that he started with left abd pain and went to ER for evaluation. CT of the abdomen with evidence of a 13 mm obstructing calculus in the left ureter with severe left hydronephrosis and perinephric fat stranding.  His creatinine was 1.2. He had mild leukocytosis with a WBC count of 12.3. Urology was consulted and he went to OR for a cysto with lithotripsy and ureteral stent placement. He removed stent Friday 3/19. Prior to hospital discharge his renal function has improved and his WBC count is down to 8.6. Denies current pain. Drinking water. Diabetes Mellitus Type 2: Current symptoms/problems include none. Home blood sugar records: patient does not test  Any episodes of hypoglycemia? no  Eye exam current (within one year): no  Tobacco history: He  reports that he has never smoked. He has never used smokeless tobacco.     Hypertension:  Home blood pressure monitoring: No.  He is not adherent to a low sodium diet. Patient denies chest pain, shortness of breath, peripheral edema, palpitations and dry cough. Antihypertensive medication side effects: no medication side effects noted. Hyperlipidemia:  No new myalgias or GI upset on pravastatin (Pravachol).        Lab Results   Component Value Date    LABA1C 6.5 (H) 2021    LABA1C 6.9 2020    LABA1C 6.4 2019     Lab Results   Component Value Date    CREATININE 0.9 2021     Lab Results   Component Value Date    ALT 33 2021    AST 28 2021     Lab Results   Component Value Date    CHOL 144 2021    TRIG 141 03/16/2021    HDL 43 03/16/2021    LDLCALC 73 03/16/2021          Review of Systems   Constitutional: Negative for activity change and fever. Respiratory: Negative for cough. Cardiovascular: Negative for chest pain. Genitourinary: Negative for difficulty urinating, dysuria, frequency and hematuria. Prior to Visit Medications    Medication Sig Taking? Authorizing Provider   ibuprofen (ADVIL;MOTRIN) 800 MG tablet Take 0.5 tablets by mouth every 8 hours as needed for Pain Yes KAMILLE Lamar NP   amitriptyline (ELAVIL) 50 MG tablet Take 50 mg by mouth nightly Yes Historical Provider, MD   benazepril (LOTENSIN) 40 MG tablet Take 40 mg by mouth daily Yes Historical Provider, MD   metFORMIN (GLUCOPHAGE) 1000 MG tablet Take 2,000 mg by mouth daily (with breakfast) Yes Historical Provider, MD   metoprolol succinate (TOPROL XL) 50 MG extended release tablet Take 50 mg by mouth daily Yes Historical Provider, MD   pravastatin (PRAVACHOL) 20 MG tablet Take 20 mg by mouth nightly Yes Historical Provider, MD   amLODIPine (NORVASC) 5 MG tablet Take 1 tablet by mouth daily Yes Lonia Dance, MD   aspirin 81 MG tablet Take 81 mg by mouth daily. Yes Historical Provider, MD   multivitamin [facility]) per tablet Take 1 tablet by mouth daily. Yes Historical Provider, MD   tamsulosin (FLOMAX) 0.4 MG capsule Take 1 capsule by mouth daily  Patient not taking: Reported on 3/22/2021  KAMILLE Mcconnell NP       Social History     Tobacco Use    Smoking status: Never Smoker    Smokeless tobacco: Never Used    Tobacco comment: Advised to not start   Substance Use Topics    Alcohol use:  Yes     Alcohol/week: 0.0 standard drinks     Comment: 3 beers/9 weeks    Drug use: No        PHYSICAL EXAMINATION:  [ INSTRUCTIONS:  \"[x]\" Indicates a positive item  \"[]\" Indicates a negative item  -- DELETE ALL ITEMS NOT EXAMINED]  Patient-Reported Vitals 3/22/2021   Patient-Reported Weight 348lb   Patient-Reported Height 5'10\" Constitutional: [x] Appears well-developed and well-nourished [x] No apparent distress      [] Abnormal-   Mental status  [x] Alert and awake  [x] Oriented to person/place/time [x]Able to follow commands      Eyes:  EOM    [x]  Normal  [] Abnormal-  Sclera  [x]  Normal  [] Abnormal -         Discharge [x]  None visible  [] Abnormal -    HENT:   [x] Normocephalic, atraumatic. [] Abnormal   [x] Mouth/Throat: Mucous membranes are moist.     External Ears [x] Normal  [] Abnormal-     Neck: [x] No visualized mass     Pulmonary/Chest: [x] Respiratory effort normal.  [x] No visualized signs of difficulty breathing or respiratory distress        [] Abnormal-      Musculoskeletal:   [] Normal gait with no signs of ataxia         [x] Normal range of motion of neck        [] Abnormal-       Neurological:        [x] No Facial Asymmetry (Cranial nerve 7 motor function) (limited exam to video visit)          [x] No gaze palsy        [] Abnormal-         Skin:        [x] No significant exanthematous lesions or discoloration noted on facial skin         [] Abnormal-            Psychiatric:       [x] Normal Affect [x] No Hallucinations        [] Abnormal-     Other pertinent observable physical exam findings-     ASSESSMENT/PLAN:  1. Kidney stone  S/p removal 3/16/21  Advised to stay hydrated with water. Advised to f/u with urology  - Michelle Duarte MD, [facility], [facility]    2. Controlled type 2 diabetes mellitus without complication, without long-term current use of insulin (HCC)  Controlled. Continue metformin 2000 mg daily. Advised to work on low carb diet, aerobic exercise, and weight loss. 3. Essential hypertension  Advised to monitor home BP  - amLODIPine (NORVASC) 5 MG tablet; Take 1 tablet by mouth daily  Dispense: 90 tablet; Refill: 1  - Metoprolol succinate 50 mg daily   - benazepril 40 mg daily    4.  Hyperlipidemia with target LDL less than 100  Lipids controlled   Continue pravastatin 20 mg qhs. Return in about 3 months (around 6/22/2021), or if symptoms worsen or fail to improve, for chronic conditions, with Dr. Deidre Boateng. Mino Falk, was evaluated through a synchronous (real-time) audio-video encounter. The patient (or guardian if applicable) is aware that this is a billable service. Verbal consent to proceed has been obtained within the past 12 months. The visit was conducted pursuant to the emergency declaration under the [address] [place] [address] and the [employer] and [employer] General Act. Patient identification was verified, and a caregiver was present when appropriate. The patient was located in a state where the provider was credentialed to provide care. Total time spent on this encounter: 25 minutes     --KAMILLE Man CNP on 3/22/2021 at 9:49 AM    An electronic signature was used to authenticate this note.

## 2021-04-05 ENCOUNTER — NURSE TRIAGE (OUTPATIENT)
Dept: OTHER | Facility: CLINIC | Age: 61
End: 2021-04-05

## 2021-04-05 NOTE — TELEPHONE ENCOUNTER
Reason for Disposition   Weakness (i.e., loss of strength) in hand or fingers (Exception: not truly weak; hand feels weak because of pain)    Answer Assessment - Initial Assessment Questions  1. ONSET: \"When did the pain start? \"      2 weeks ago    2. LOCATION: \"Where is the pain located? \"      Left shoulder into arm    3. PAIN: \"How bad is the pain? \" (Scale 1-10; or mild, moderate, severe)    - MILD (1-3): doesn't interfere with normal activities    - MODERATE (4-7): interferes with normal activities (e.g., work or school) or awakens from sleep    - SEVERE (8-10): excruciating pain, unable to do any normal activities, unable to move arm at all due to pain      4/10 increases with activity to 9/10    4. WORK OR EXERCISE: \"Has there been any recent work or exercise that involved this part of the body? \"      Denies    5. CAUSE: \"What do you think is causing the shoulder pain? \"      Unsure    6. OTHER SYMPTOMS: \"Do you have any other symptoms? \" (e.g., neck pain, swelling, rash, fever, numbness, weakness)      Swelling left shoulder, numbness and tingling to bottom of arm and little finger, left side neck pain also, weakness    7. PREGNANCY: \"Is there any chance you are pregnant? \" \"When was your last menstrual period? \"      n/a    Protocols used: SHOULDER PAIN-ADULT-[place]    Received call from [facility] at pre-service center [facility]) [place] with Red Flag Complaint. Brief description of triage: as above    Triage indicates for patient to be seen today pt a  and out of town and cant get back until later this week so told pt to go to urgent care in his area    Care advice provided, patient verbalizes understanding; denies any other questions or concerns; instructed to call back for any new or worsening symptoms. Attention Provider: Thank you for allowing me to participate in the care of your patient. The patient was connected to triage in response to information provided to the [facility].   Please do not respond through this encounter as the response is not directed to a shared pool.

## 2021-04-06 ENCOUNTER — NURSE TRIAGE (OUTPATIENT)
Dept: OTHER | Facility: CLINIC | Age: 61
End: 2021-04-06

## 2021-04-07 ENCOUNTER — OFFICE VISIT (OUTPATIENT)
Dept: FAMILY MEDICINE CLINIC | Age: 61
End: 2021-04-07
Payer: COMMERCIAL

## 2021-04-07 VITALS
SYSTOLIC BLOOD PRESSURE: 138 MMHG | DIASTOLIC BLOOD PRESSURE: 80 MMHG | HEIGHT: 70 IN | OXYGEN SATURATION: 98 % | BODY MASS INDEX: 45.1 KG/M2 | HEART RATE: 74 BPM | WEIGHT: 315 LBS | TEMPERATURE: 96.7 F

## 2021-04-07 DIAGNOSIS — M75.82 ROTATOR CUFF TENDINITIS, LEFT: ICD-10-CM

## 2021-04-07 DIAGNOSIS — G56.22 CUBITAL TUNNEL SYNDROME ON LEFT: ICD-10-CM

## 2021-04-07 DIAGNOSIS — M75.22 BICEPS TENDINITIS OF LEFT UPPER EXTREMITY: Primary | ICD-10-CM

## 2021-04-07 PROCEDURE — G8427 DOCREV CUR MEDS BY ELIG CLIN: HCPCS | Performed by: FAMILY MEDICINE

## 2021-04-07 PROCEDURE — 3017F COLORECTAL CA SCREEN DOC REV: CPT | Performed by: FAMILY MEDICINE

## 2021-04-07 PROCEDURE — G8417 CALC BMI ABV UP PARAM F/U: HCPCS | Performed by: FAMILY MEDICINE

## 2021-04-07 PROCEDURE — 99214 OFFICE O/P EST MOD 30 MIN: CPT | Performed by: FAMILY MEDICINE

## 2021-04-07 PROCEDURE — 1036F TOBACCO NON-USER: CPT | Performed by: FAMILY MEDICINE

## 2021-04-07 RX ORDER — PREDNISONE 10 MG/1
TABLET ORAL
Qty: 30 TABLET | Refills: 0 | Status: SHIPPED | OUTPATIENT
Start: 2021-04-07 | End: 2021-04-17

## 2021-04-07 RX ORDER — OMEPRAZOLE 20 MG/1
20 CAPSULE, DELAYED RELEASE ORAL
Qty: 30 CAPSULE | Refills: 0 | Status: SHIPPED | OUTPATIENT
Start: 2021-04-07

## 2021-04-07 RX ORDER — TRAMADOL HYDROCHLORIDE 50 MG/1
50 TABLET ORAL EVERY 6 HOURS PRN
Qty: 12 TABLET | Refills: 0 | Status: SHIPPED | OUTPATIENT
Start: 2021-04-07 | End: 2021-04-10

## 2021-04-07 NOTE — PROGRESS NOTES
2021    Blood pressure 138/80, pulse 74, temperature 96.7 °F (35.9 °C), temperature source Temporal, height 5' 10\" (1.778 m), weight (!) 346 lb (156.9 kg), SpO2 98 %. Vishnu Degroot (:  1960) is a 56 y.o. male, here for evaluation of the following medical concerns:    Chief Complaint   Patient presents with    Shoulder Pain     left shoulder pain and swelling - also involves left arm and neck pain     Onset last week wed or thurs, 7-8 days ago, severe pain to left shoulder without recalling any injury. Starts left neck, shoulder, down arm to small fingers with tingling/vibration but no loss of sensation. Weakness of hand is noted also- with grasping. Pain is worsened more by moving neck than moving shoulder. He works as a - has to [address] [place] [address] [employer]. But does not recall any injury to neck  He just had kidney stone and was hospitalized in March. Has hx of lumbar disc disease. He is taking ibuprofen 800 mg for the pain- not helping.     Has well controlled diabetes     Lab Results   Component Value Date    LABA1C 6.5 2021    LABA1C 6.9 2020    LABA1C 6.4 2019      Last renal function test:   Lab Results   Component Value Date     2021    K 4.2 2021    BUN 20 2021    CREATININE 0.9 2021          Patient Active Problem List   Diagnosis    Plantar fasciitis    Lumbosacral radiculopathy-L5 by EMG, Epidural spring 2011    Spinal stenosis    Essential hypertension    Metabolic syndrome X    Hyperlipidemia with target LDL less than 100    Seborrheic dermatitis    Mood swings    Insomnia    Obesity    Foot drop    Tinnitus    History of nephrolithiasis    Allergic rhinitis    Needs flu shot    Chronic headache    Obstructive sleep apnea syndrome    Outbursts of anger    Primary insomnia    History of influenza vaccine allergy    Ureteral stone    Controlled type 2 diabetes mellitus without complication, without long-term obese. He is not ill-appearing. HENT:      Head: Normocephalic and atraumatic. Neck:      Musculoskeletal: Normal range of motion. Pulmonary:      Effort: Pulmonary effort is normal.   Musculoskeletal: Normal range of motion. Comments: Neck: reduced ROM due to discomfort, nat extension. Tender left trapezius  Neg spurling's    L shoulder: FROM. + impingement (++ hawkin's) and pain with resisted biceps and supraspinatus flexion. + tender over AC joint  No supraspinatus weakness   Skin:     Findings: No rash. Neurological:      General: No focal deficit present. Mental Status: He is alert and oriented to person, place, and time. Mental status is at baseline. Comments:  and sensation normal in LUE  + cubital tinel's   Psychiatric:         Mood and Affect: Mood normal.         Behavior: Behavior normal.         Thought Content: Thought content normal.         Judgment: Judgment normal.         ASSESSMENT/PLAN:    1. Biceps tendinitis of left upper extremity  - I think his pain is all shoulder, not neck/radiculopathy. Some injury or strain has happened, though he does not recall.  - discussed the mult-faceted approach for treatment of shoulder: meds, rest, PT. May need ortho eval if not improving. Continue ibu (ordered PPI for use this month), tramadol mainly at bedtime, refer to PT. OARRS report reviewed; is consistent with prescribed use and free of suspicious activity. - traMADol (ULTRAM) 50 MG tablet; Take 1 tablet by mouth every 6 hours as needed for Pain for up to 3 days. Intended supply: 3 days. Take lowest dose possible to manage pain  Dispense: 12 tablet; Refill: 0  - [facility]    2. Rotator cuff tendinitis, left  - As above   - traMADol (ULTRAM) 50 MG tablet; Take 1 tablet by mouth every 6 hours as needed for Pain for up to 3 days. Intended supply: 3 days. Take lowest dose possible to manage pain  Dispense: 12 tablet;  Refill: 0  - [facility] [place]    3. Cubital tunnel syndrome on left  - discussed the nature of this syndrome and that I think it is ulnar neuropathy at elbow, not a cervical disc, based on exam.  This is likely related to his shoulder injury and I do not recommend further eval (EMG/NCS) at this time as it may improve with meds and PT.  - Austin Manuel      Follow up: with PCP as needed for this if not improving. Or can refer to [facility]. An  electronicsignature was used to authenticate this note.     --Ivan Mazariegos MD on 4/7/2021 at 11:56 AM

## 2021-04-09 ENCOUNTER — TELEPHONE (OUTPATIENT)
Dept: FAMILY MEDICINE CLINIC | Age: 61
End: 2021-04-09

## 2021-04-12 ENCOUNTER — OFFICE VISIT (OUTPATIENT)
Dept: FAMILY MEDICINE CLINIC | Age: 61
End: 2021-04-12
Payer: COMMERCIAL

## 2021-04-12 VITALS
WEIGHT: 315 LBS | OXYGEN SATURATION: 97 % | HEART RATE: 80 BPM | DIASTOLIC BLOOD PRESSURE: 94 MMHG | SYSTOLIC BLOOD PRESSURE: 144 MMHG | BODY MASS INDEX: 49.79 KG/M2 | RESPIRATION RATE: 14 BRPM

## 2021-04-12 DIAGNOSIS — G89.29 CHRONIC BILATERAL LOW BACK PAIN WITH LEFT-SIDED SCIATICA: Primary | ICD-10-CM

## 2021-04-12 DIAGNOSIS — M54.42 CHRONIC BILATERAL LOW BACK PAIN WITH LEFT-SIDED SCIATICA: Primary | ICD-10-CM

## 2021-04-12 PROCEDURE — 3017F COLORECTAL CA SCREEN DOC REV: CPT | Performed by: FAMILY MEDICINE

## 2021-04-12 PROCEDURE — G8417 CALC BMI ABV UP PARAM F/U: HCPCS | Performed by: FAMILY MEDICINE

## 2021-04-12 PROCEDURE — G8427 DOCREV CUR MEDS BY ELIG CLIN: HCPCS | Performed by: FAMILY MEDICINE

## 2021-04-12 PROCEDURE — 99213 OFFICE O/P EST LOW 20 MIN: CPT | Performed by: FAMILY MEDICINE

## 2021-04-12 PROCEDURE — 1036F TOBACCO NON-USER: CPT | Performed by: FAMILY MEDICINE

## 2021-04-12 RX ORDER — TRAMADOL HYDROCHLORIDE 50 MG/1
50 TABLET ORAL EVERY 6 HOURS PRN
Qty: 12 TABLET | Refills: 0 | Status: SHIPPED | OUTPATIENT
Start: 2021-04-12 | End: 2021-04-15

## 2021-04-12 NOTE — PROGRESS NOTES
dose possible to manage pain 12 tablet 0    predniSONE (DELTASONE) 10 MG tablet Take 4 tabs for 3 days, then 3 tabs for 3 days, then 2 tabs for 3 days, then 1 tab for 3 days. 30 tablet 0    omeprazole (PRILOSEC) 20 MG delayed release capsule Take 1 capsule by mouth every morning (before breakfast) 30 capsule 0    amLODIPine (NORVASC) 5 MG tablet Take 1 tablet by mouth daily 90 tablet 1    amitriptyline (ELAVIL) 50 MG tablet Take 1 tablet by mouth nightly 90 tablet 1    metFORMIN (GLUCOPHAGE) 1000 MG tablet Take 2 tablets by mouth daily (with breakfast) 180 tablet 1    metoprolol succinate (TOPROL XL) 50 MG extended release tablet Take 1 tablet by mouth daily 90 tablet 1    pravastatin (PRAVACHOL) 20 MG tablet Take 1 tablet by mouth nightly 90 tablet 1    ibuprofen (ADVIL;MOTRIN) 800 MG tablet Take 0.5 tablets by mouth every 8 hours as needed for Pain 120 tablet 0    benazepril (LOTENSIN) 40 MG tablet Take 40 mg by mouth daily      aspirin 81 MG tablet Take 81 mg by mouth daily.  multivitamin (THERAGRAN) per tablet Take 1 tablet by mouth daily. No current facility-administered medications for this visit. BP (!) 144/94   Pulse 80   Resp 14   Wt (!) 347 lb (157.4 kg)   SpO2 97%   BMI 49.79 kg/m²     Physical Exam  Musculoskeletal:      Comments: TTP lumbar L paraspinal area and L upper buttock  +SLR  Left leg with 4/5 strength dorsiflexion  Otherwise 5/5 strength  Symmetrical reflexes         Wt Readings from Last 3 Encounters:   04/12/21 (!) 347 lb (157.4 kg)   04/07/21 (!) 346 lb (156.9 kg)   03/16/21 (!) 355 lb (161 kg)       BP Readings from Last 3 Encounters:   04/12/21 (!) 144/94   04/07/21 138/80   03/17/21 (!) 141/71         Assessment/Plan:  1. Chronic bilateral low back pain with left-sided sciatica  - MRI LUMBAR SPINE WO CONTRAST; Future  - [facility] Loretta Sanchez MD, Spine Surgery DEPARTMENT [facility]  - traMADol (ULTRAM) 50 MG tablet;  Take 1 tablet by mouth every 6 hours as needed for Pain for up to 3 days. Intended supply: 3 days. Take lowest dose possible to manage pain  Dispense: 12 tablet; Refill: 0    Recent abdominal CT showed an L5 listhesis. He has some weakness with dorsiflexion on exam. Concern for L5 nerve impingement. He is in significant pain. Called in tramadol. MRI ordered and referred for consideration of injections. Call for red flag symptoms. Return if symptoms worsen or fail to improve.

## 2021-04-15 ENCOUNTER — HOSPITAL ENCOUNTER (OUTPATIENT)
Dept: PHYSICAL THERAPY | Age: 61
Setting detail: THERAPIES SERIES
Discharge: HOME | End: 2021-04-15
Payer: COMMERCIAL

## 2021-04-15 PROCEDURE — 97163 PT EVAL HIGH COMPLEX 45 MIN: CPT

## 2021-04-15 PROCEDURE — 97140 MANUAL THERAPY 1/> REGIONS: CPT

## 2021-04-15 PROCEDURE — 97110 THERAPEUTIC EXERCISES: CPT

## 2021-04-15 NOTE — PROGRESS NOTES
[facility]  [address]     [place], [facility]  Phone: [phone]       Fax:   [phone]       Physical Therapy Certification    Dear Dr. Geneva Bartholomew  ,    We had the pleasure of evaluating the following patient for physical therapy services at [address] [facility]. A summary of our findings can be found in the initial assessment below. This includes our plan of care. If you have any questions or concerns regarding these findings, please do not hesitate to contact me at the office phone number checked above. Thank you for the referral.       Physician Signature:_______________________________Date:__________________  By signing above (or electronic signature), therapist's plan is approved by physician      Patient: Leon Thornton   : 1960   MRN: [id number]    Referring Physician:  Bren Soria MD        Evaluation Date: 4/15/2021        Medical Diagnosis Information:  Biceps Tendinitis of L UE M75.22                                                Insurance information:  [facility]     Precautions/ Contra-indications:   Latex Allergy:  [x]NO      []YES     Preferred Language for Healthcare:   [x]English       []other:    C-SSRS Triggered by Intake questionnaire (Past 2 wk assessment):   [x] No, Questionnaire did not trigger screening.   [] Yes, Patient intake triggered further evaluation      [] C-SSRS Screening completed  [] PCP notified via Plan of Care  [] Emergency services notified        SUBJECTIVE FINDINGS      History of Present Illness:      Pt presents with C/o L UE arm pain. Feels like his entire L UE is wrapped in an ace bandage. Feels week and tired. He is a , gone 2-3 months at a time. He is taking the next 4-6 weeks off to try to recover from several injuries. Was recently (March) in the hospital for a kidney stone. Has back and hip pain. He did keep working every day through the covid crisis. Feels pretty good in the am.  The longer into the day it gets, the worse it hurts. 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 Drives 11 hours every day. Sleeps in his truck. Household chores make his pain go eric high. Entire shoulder, anterior pectoralis, triceps area and 4th/5th digit. Has always slept on his LEFT side, but the past 2-3 months, he has needed to sleep on his Right. Pain       Patient reports pain is  6-7/10 pain at present and  9-10/10 pain at its worst.  1/10 at best , usually in the am.  Pain increases with : activity      Decreases with: nothing. Pt. reports pain with coughing, sneezing and laughing:  []Yes   []No        Current Functional Limitations:   [x]Yes   []No  Functional Complaints: Unable to use arm for work, unable to lift objects or tolerate activity for any length of time. PLOF:   [x]No functional limitations   []Pre-exisiting limitations:  Pt's sleep is affected? []Yes   []No       Relevant Medical History:   Past Medical History:   Diagnosis Date    Arthritis     High blood pressure     History of influenza vaccine allergy 11/17/2017    HTN (hypertension)     Hyperlipidemia LDL goal < 100     Insomnia     Lumbosacral radiculopathy-L5 by EMG     epidural spring 6661    Metabolic syndrome X     Nephrolithiasis 7/3/2013    Obesity     Pneumonia 2008    Tinnitus 12/20/2012       Functional Scale/Score:  SPADI  Score 82 ; Percent 63%      Occupation/School: .      Patient goal for therapy:  \"to get rid of this pain before I have to go back out on the road\"      OBJECTIVE FINDINGS    Gait/Steps/Balance       [x]Gait WNL                           [] Deviations on a level linoleum surface include:        Posture: [x] WNL  []Forward head  []Forward flexed trunk   []Pronounced CT junction    []Increased thoracic kyphosis   []Scoliosis  []Scapular winging  []Decreased WB on   []R   []L     []Other:       Range of Motion  [x]All [facility]  [x]Cervical Spine   []Thoracic Spine     ROM Deficits Identified             *Denotes pain AROM              % Decresased PROM              % Decreased MMT/Resisted Tests   Flexion 25%     Extension 25%     Sidebending Left      Sidebending Right      Rotation Left 50%     Rotation Right 50%         UE Range of Motion/Strength Testing      [x]All ROM WFL except as marked below   [x]All strength WFL (5/5) except as marked below    [x]All myotomes WFL (5/5) except as marked below      Range Tested MMT / Resisted PROM AROM Comments   *denotes pain Left Right Left Right Left Right    Cervical Flexion C1-2          Cervical Sidebend  C3          Shoulder Shrug  C4          Shoulder Flexion 3 5        Shoulder Extension          Shoulder Abduction  C5 3 5 85 170      Shoulder Adduction  4+ 5        Shoulder IR 3+ 5 NT       Shoulder ER 3+ 5 NT       Elbow Flexion  C6 4 5 170 180      Elbow Extension C7 3+ 5        Pronation 4 5        Supination 4 5        Wrist Flexion C7 5 5        Wrist Extension C6 5 5        Radial Deviation          Ulnar Deviation            5 5        Thumb Ext C8 5 5        Intrinsics T1 5 5            Scapular Strength    []NT Limited by pain      Scapula Left Right   Upper Trapezius     Middle Trapezius     Lower Trapezius     Rhomboid     Serratus Anterior       Latissimus Dorsi          Special Tests- UE    Special Test Abnormal Findings   Empty can test/supraspinatus [x]Pos L      Drop arm test [x]Neg     Neer's impingement [x]Pos L      Resisted IR/ ER [x] Painful , weak           Palpation     Patient reported tenderness with palpation:  [x]Yes     Location:  Left pectoralis mm, Left SITS insertions, Left Subscapularis, L UT  PT notes warmth:  []Yes   [x]No   []NA  Location:   PT notes increased muscle tone:   []Yes   [x]No   []NA  Ligament tenderness/provocation:   [x]Yes   []No   []NA  Pain with gentle joint mobilization of L shoulder    Deep Tendon Reflexes   1+,   Difficult to provoke Triceps, Biceps    Dermatomal Sensation:   Pt reports N/T of 4th / 5th Digits                            [x] Patient history, allergies, meds reviewed. Medical chart reviewed. See intake form. Review Of Systems (ROS):  [x]Performed Review of systems (Integumentary, CardioPulmonary, Neurological) by intake and observation. Intake form has been scanned into medical record. Patient has been instructed to contact their primary care physician regarding ROS issues if not already being addressed at this time.         Co-morbidities/Complexities (which will affect course of rehabilitation):   []None           Arthritic conditions   []Rheumatoid arthritis (M05.9)  [x]Osteoarthritis (M19.91)   Cardiovascular conditions   []Hypertension (I10)  []Hyperlipidemia (E78.5)  []Angina pectoris (I20)  []Atherosclerosis (I70)   Musculoskeletal conditions   []Disc pathology   []Congenital spine pathologies   []Prior surgical intervention  []Osteoporosis (M81.8)  []Osteopenia (M85.8)   Endocrine conditions   []Hypothyroid (E03.9)  []Hyperthyroid Gastrointestinal conditions   []Constipation (O27.72)   Metabolic conditions   [x]Morbid obesity (E66.01)  []Diabetes type 1(E10.65) or 2 (E11.65)   []Neuropathy (G60.9)     Pulmonary conditions   []Asthma (J45)  []Coughing   []COPD (J44.9)   Psychological Disorders  [x]Anxiety (F41.9)  []Depression (F32.9)   []Other:   []Other:            Barriers to/and or personal factors that will affect rehab potential:              []Age  []Sex    []Smoker              []Motivation/Lack of Motivation                        [x]Co-Morbidities              []Cognitive Function, education/learning barriers              []Environmental, home barriers              []profession/work barriers  []past PT/medical experience  []other:  Justification:     Falls Risk Assessment (30 days): [x]NA  [x] Falls Risk assessed and no intervention required. [] Falls Risk assessed and Patient requires intervention due to being higher risk   TUG score (>12s at risk):     [] Falls education provided, including:         ASSESSMENT: Pt is 56 Y. O  male, presenting with c/o severe pain , weakness and N/T of L UE from shoulder to hand. Differential consists of cervical impingement etiology vs RTC tear vs Shoulder tendinopathy. Assessment reveals deficits in strength, ROM, alignment as well as increased pain. Pt will benefit from cont PT to address these deficits and promote return to highest level of functional independence. May benefit from referral to ortho shoulder specialist if symptoms persist.   Will monitor closely.     Functional Impairments:     []Noted spinal or UE joint hypomobility   []Noted spinal or UE joint hypermobility   []Decreased spinal/UE functional ROM   [x]Abnormal reflexes/sensation/myotomal/dermatomal deficits   [x]Decreased RC/scapular/core strength and neuromuscular control    [x]Decreased UE functional strength   []other:       Functional Activity Limitations (from functional questionnaire and intake)   [x]Reduced ability to tolerate prolonged functional positions   []Reduced ability or difficulty with changes of positions or transfers between positions   [x]Reduced ability to maintain good posture and demonstrate good body mechanics with sitting, bending, and lifting   [] Reduced ability or tolerance with driving and/or computer work   []Reduced ability to perform lifting, reaching, carrying tasks   [x]Reduced ability to reach behind back   []Reduced ability to sleep    []Reduced ability to tolerate any impact through UE or spine   [x]Reduced ability to  or hold objects   []Reduced ability to throw or toss an object   []other:     Participation Restrictions   []Reduced participation in self care activities   []Reduced participation in home management activities   [x]Reduced participation in work activities   []Reduced clinical presentation with:  [] stable and/or uncomplicated characteristics   [] evolving clinical presentation with changing characteristics  [x] unstable and unpredictable characteristics;   [x] Clinical decision making of [] low, [] moderate, [x] high complexity using standardized patient assessment instrument and/or measurable assessment of functional outcome. [] EVAL (LOW) 42136 (typically 20 minutes face-to-face)  [] EVAL (MOD) 55365 (typically 30 minutes face-to-face)  [x] EVAL (HIGH) 69015 (typically 45 minutes face-to-face)  [] RE-EVAL      PLAN:  Frequency/Duration: 2 days per week for 4-6 Weeks:    Interventions:  [x]  Therapeutic exercise including: strength training, ROM, for scapula, core and Upper extremity, including postural re-education. [x]  NMR activation and proprioception for UE, periscapular and RC muscles and Core, including postural re-education. [x]  Manual therapy as indicated for shoulder, scapula, spine and associated soft tissue including: Dry Needling/IASTM, STM, PROM, Gr I-IV mobilizations, manipulation. [x] Modalities as needed that may include: thermal agents, E-stim, Biofeedback, US, iontophoresis as indicated  [x] Patient education on joint protection, postural re-education, activity modification, progression of HEP. HEP instruction: Pt provided HEP via [address] [place] [address]   Access Code: [email or web address] 04/15/2021Prepared by: Hawa OlearyExercises   Supine Shoulder Flexion AAROM - 1 x daily - 7 x weekly - 10 reps - 3 sets   Seated Shoulder Flexion Slide at Table Top with Forearm in Neutral - 1 x daily - 7 x weekly - 10 reps - 3 sets   Isometric Shoulder Adduction - 1 x daily - 7 x weekly - 10 reps - 3 sets        GOALS:  Patient stated goal: Get rid of this pain and weakness in my arm   [] Progressing: [] Met: [] Not Met: [] Adjusted    Therapist goals for Patient:   Short Term Goals: To be achieved in: 2 weeks  1.  Independent in HEP and

## 2021-04-15 NOTE — FLOWSHEET NOTE
Physical Therapy Daily Treatment Note  Date:  4/15/2021    Patient Name:  Mya Nova    :  1960  MRN: [id number]      Medical/Treatment Diagnosis Information:  ·   Biceps Tendinitis of L UE M75.22  ·    ·   Insurance/Certification information:   [facility]    Physician Information:   [address] of care signed (Y/N): Sent 4/15/21    Date of Patient follow up with Physician: JUSTINED    Is this a Progress Report:     []  Yes  [x]  No      If Yes:  Date Range for reporting period:  Beginning  Ending    Progress report will be due (10 Rx or 30 days whichever is less):   Recertification will be due (POC Duration  / 90 days whichever is less): 7/15/21        Visit # Insurance Allowable Auth Required    per  year []  Yes [x]  No        Functional Scale: Spadi      Date assessed: 4/15/21       Latex Allergy:  [x]NO      []YES  Preferred Language for Healthcare:   [x]English       []other:    Pain level:  6-9/10     SUBJECTIVE:     Pt presents with C/o L UE arm pain. Feels like his entire L UE is wrapped in an ace bandage. Feels week and tired. He is a , gone 2-3 months at a time. He is taking the next 4-6 weeks off to try to recover from several injuries. Was recently (March) in the hospital for a kidney stone. Has back and hip pain. He did keep working every day through the covid crisis. Feels pretty good in the am.  The longer into the day it gets, the worse it hurts. 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 Drives 11 hours every day. Sleeps in his truck. Household chores make his pain go eric high. Entire shoulder, anterior pectoralis, triceps area and 4th/5th digit.    Has always slept on his LEFT side, but the past 2-3 months, he has needed to sleep on his Right.          RESTRICTIONS/PRECAUTIONS:   no restrictions noted    OBJECTIVE:       Exercises/Interventions:     Exercises in bold performed in department today. Items not bolded are carried forward from prior visits for continuity of the record. Exercise/Equipment Resistance/Repetitions HEP Other comments       []    Supine AAROM shoulder flexion    x10 [x]    Table slide    x10  [x]    Adduction Isometric   x10 Submax  [x]        []        []        []        []        []          []         []        []        []        []        []        []        []        []      Therapeutic Exercise/Home Exercise Program:   20 minutes  Pt education provided regarding anatomy and physiology associated with dx, etiology of Symptoms and plan of care. All questions answered to pt satisfaction. See above for HEP    Therapeutic Activity:  0 minutes     Gait: 0 minutes    Neuromuscular Re-Education:  0 minutes      Canalith Repositioning Procedure:      Manual Therapy:  10 minutes  Gentle shoulder distraction/ MFR unwind  Deep tendon release to L UT/ Subscapularis/ Pectoralis   Gentle joint mobilization GR I, II   Limited tolerance to manual techniques due to pain    Modalities: 0 minutes        Therapeutic Exercise and NMR EXR  [x] (73516) Provided verbal/tactile cueing for activities related to strengthening, flexibility, endurance, ROM  for improvements in proximal hip and core control with self care, mobility, lifting and ambulation.  [] (77214) Provided verbal/tactile cueing for activities related to improving balance, coordination, kinesthetic sense, posture, motor skill, proprioception  to assist with core control in self care, mobility, lifting, and ambulation.      Therapeutic Activities:    [] (79972 or 11829) Provided verbal/tactile cueing for activities related to improving balance, coordination, kinesthetic sense, posture, motor skill, proprioception and motor activation to allow for proper function  with self care and ADLs  [] (13998) Provided training and instruction to the patient for proper core and proximal hip recruitment and positioning with ambulation re-education     Home Exercise Program:    [x] (11454) Reviewed/Progressed HEP activities related to strengthening, flexibility, endurance, ROM of core, proximal hip and LE for functional self-care, mobility, lifting and ambulation   [] (88529) Reviewed/Progressed HEP activities related to improving balance, coordination, kinesthetic sense, posture, motor skill, proprioception of core, proximal hip and LE for self care, mobility, lifting, and ambulation      Manual Treatments:  PROM / STM / Oscillations-Mobs:  G-I, II, III, IV (PA's, Inf., Post.)  [x] (60716) Provided manual therapy to mobilize proximal hip and LS spine soft tissue/joints for the purpose of modulating pain, promoting relaxation,  increasing ROM, reducing/eliminating soft tissue swelling/inflammation/restriction, improving soft tissue extensibility and allowing for proper ROM for normal function with self care, mobility, lifting and ambulation. Modalities:       Charges:  Timed Code Treatment Minutes: 45   Total Treatment Minutes: 30     [x] EVAL  [x] DW(07276) x   1  [] IONTO  [] NMR (47902) x     [] VASO  [x] Manual (19703) x1    [] Other:  [] TA x     [] Gait x   [] Mech Traction (57906)  [] ES(attended) (77433)     [] ES (un) (65560):         ASSESSMENT:  Pt presents with c/o severe L UE pain. Factors include (+) point tenderness to RTC insertions, weakness with (+) Empty Can test, NT that extends to 4th / 5th digits of Left hand and a feeling of \"tightness\" throughout entire extremity. Initiated manual techniques and gentle AAROM ex. GOALS:  Patient stated goal: Get rid of this pain and weakness in my arm   []? Progressing: []? Met: []? Not Met: []? Adjusted     Therapist goals for Patient:   Short Term Goals: To be achieved in: 2 weeks  1. Independent in HEP and progression per patient tolerance, in order to prevent re-injury. []? Progressing: []? Met: []? Not Met: []? Adjusted  2.  Patient will have a decrease in pain to facilitate improvement in movement, function, and ADLs as indicated by Functional Deficits. []? Progressing: []? Met: []? Not Met: []? Adjusted     Long Term Goals: To be achieved in: 6 weeks  1. Disability index score of 10% or less for the Quick DASH to assist with reaching prior level of function. []? Progressing: []? Met: []? Not Met: []? Adjusted  2. Patient will demonstrate increased AROM to L UE to allow for proper joint functioning as indicated by Functional Deficits. []? Progressing: []? Met: []? Not Met: []? Adjusted  3. Patient will demonstrate an increase in NM recruitment/activation and overall GH and scapular strength to within n5lbs HHD or WNL for proper functional mobility as indicated by patients Functional Deficits. []? Progressing: []? Met: []? Not Met: []? Adjusted  4. Patient will return to all activities of work/ driving  without increased symptoms or restriction. []? Progressing: []? Met: []? Not Met: []? Adjusted  5.  []? Progressing: []? Met: []? Not Met: []? Adjusted       Overall Progression Towards Functional goals/ Treatment Progress Update:  [] Patient is progressing as expected towards functional goals listed. [] Progression is slowed due to complexities/Impairments listed. [] Progression has been slowed due to co-morbidities.   [x] Plan just implemented, too soon to assess goals progression <30days   [] Goals require adjustment due to lack of progress  [] Patient is not progressing as expected and requires additional follow up with physician  [] Other    Prognosis for POC: [x] Good [] Fair  [] Poor      Patient requires continued skilled intervention: [x] Yes  [] No    Treatment/Activity Tolerance:  [x] Patient able to complete treatment  [] Patient limited by fatigue  [x] Patient limited by pain    [] Patient limited by other medical complications  [] Other:         PLAN: See eval  [] Continue per plan of care [] Alter current plan (see comments above)  [x] Plan

## 2021-04-16 ENCOUNTER — HOSPITAL ENCOUNTER (OUTPATIENT)
Dept: MRI IMAGING | Age: 61
Discharge: HOME | End: 2021-04-16
Payer: COMMERCIAL

## 2021-04-16 DIAGNOSIS — G89.29 CHRONIC BILATERAL LOW BACK PAIN WITH LEFT-SIDED SCIATICA: ICD-10-CM

## 2021-04-16 DIAGNOSIS — M54.42 CHRONIC BILATERAL LOW BACK PAIN WITH LEFT-SIDED SCIATICA: ICD-10-CM

## 2021-04-16 PROCEDURE — 72148 MRI LUMBAR SPINE W/O DYE: CPT

## 2021-04-20 ENCOUNTER — HOSPITAL ENCOUNTER (OUTPATIENT)
Dept: PHYSICAL THERAPY | Age: 61
Setting detail: THERAPIES SERIES
Discharge: HOME | End: 2021-04-20
Payer: COMMERCIAL

## 2021-04-20 PROCEDURE — 97110 THERAPEUTIC EXERCISES: CPT

## 2021-04-20 PROCEDURE — 97035 APP MDLTY 1+ULTRASOUND EA 15: CPT

## 2021-04-20 NOTE — FLOWSHEET NOTE
Physical Therapy Daily Treatment Note  Date:  2021    Patient Name:  Diego Hernandez    :  1960  MRN: [id number]      Medical/Treatment Diagnosis Information:  ·   Biceps Tendinitis of L UE M75.22     ·   Insurance/Certification information:   [facility]    Physician Information:   [address] of care signed (Y/N): Sent 4/15/21    Date of Patient follow up with Physician: BRITTANY    Is this a Progress Report:     []  Yes  [x]  No      If Yes:  Date Range for reporting period:  Beginning  Ending    Progress report will be due (10 Rx or 30 days whichever is less):   Recertification will be due (POC Duration  / 90 days whichever is less): 7/15/21        Visit # Insurance Allowable Auth Required    per  year []  Yes [x]  No        Functional Scale: Spadi      Date assessed: 4/15/21       Latex Allergy:  [x]NO      []YES  Preferred Language for Healthcare:   [x]English       []other:    Pain level: 1/10     SUBJECTIVE:    Pt states that he is feeling much better. The sterroids have helped reduce his pain and he is doing his exercises daily. His favorite is the table slide. Requesting to cx his next appt this week so that he can go visit an ailing family member. Pt presents with C/o L UE arm pain. Feels like his entire L UE is wrapped in an ace bandage. Feels week and tired. He is a , gone 2-3 months at a time. He is taking the next 4-6 weeks off to try to recover from several injuries. Was recently (March) in the hospital for a kidney stone. Has back and hip pain. He did keep working every day through the covid crisis. Feels pretty good in the am.  The longer into the day it gets, the worse it hurts. 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 Drives 11 hours every day. Sleeps in his truck. Household chores make his pain go eric high.     Entire shoulder, anterior pectoralis, triceps area and Provided verbal/tactile cueing for activities related to improving balance, coordination, kinesthetic sense, posture, motor skill, proprioception  to assist with core control in self care, mobility, lifting, and ambulation. Therapeutic Activities:    [] (69803 or 23317) Provided verbal/tactile cueing for activities related to improving balance, coordination, kinesthetic sense, posture, motor skill, proprioception and motor activation to allow for proper function  with self care and ADLs  [] (68753) Provided training and instruction to the patient for proper core and proximal hip recruitment and positioning with ambulation re-education     Home Exercise Program:    [x] (07523) Reviewed/Progressed HEP activities related to strengthening, flexibility, endurance, ROM of core, proximal hip and LE for functional self-care, mobility, lifting and ambulation   [] (64078) Reviewed/Progressed HEP activities related to improving balance, coordination, kinesthetic sense, posture, motor skill, proprioception of core, proximal hip and LE for self care, mobility, lifting, and ambulation      Manual Treatments:  PROM / STM / Oscillations-Mobs:  G-I, II, III, IV (PA's, Inf., Post.)  [x] (36059) Provided manual therapy to mobilize proximal hip and LS spine soft tissue/joints for the purpose of modulating pain, promoting relaxation,  increasing ROM, reducing/eliminating soft tissue swelling/inflammation/restriction, improving soft tissue extensibility and allowing for proper ROM for normal function with self care, mobility, lifting and ambulation. Modalities:       Charges:  Timed Code Treatment Minutes: 40   Total Treatment Minutes: 40     [] EVAL  [x] NQ(45057) x   1  [x] US  [] NMR (45521) x     [] VASO  [] Manual (40241) x1    [] Other:  [] TA x     [] Gait x   [] Mech Traction (80261)  [] ES(attended) (27519)     [] ES (un) (15209):         ASSESSMENT:  Pt presents with decreased shoulder pain this date.   Cont to report N/T into Left hand and weakness of entire extremity. Cont to R/O cervical etiology vs. Shoulder. Progressed HEP to include strengthening. GOALS:  Patient stated goal: Get rid of this pain and weakness in my arm   []? Progressing: []? Met: []? Not Met: []? Adjusted     Therapist goals for Patient:   Short Term Goals: To be achieved in: 2 weeks  1. Independent in HEP and progression per patient tolerance, in order to prevent re-injury. []? Progressing: []? Met: []? Not Met: []? Adjusted  2. Patient will have a decrease in pain to facilitate improvement in movement, function, and ADLs as indicated by Functional Deficits. []? Progressing: []? Met: []? Not Met: []? Adjusted     Long Term Goals: To be achieved in: 6 weeks  1. Disability index score of 10% or less for the Quick DASH to assist with reaching prior level of function. []? Progressing: []? Met: []? Not Met: []? Adjusted  2. Patient will demonstrate increased AROM to L UE to allow for proper joint functioning as indicated by Functional Deficits. []? Progressing: []? Met: []? Not Met: []? Adjusted  3. Patient will demonstrate an increase in NM recruitment/activation and overall GH and scapular strength to within n5lbs HHD or WNL for proper functional mobility as indicated by patients Functional Deficits. []? Progressing: []? Met: []? Not Met: []? Adjusted  4. Patient will return to all activities of work/ driving  without increased symptoms or restriction. []? Progressing: []? Met: []? Not Met: []? Adjusted  5.  []? Progressing: []? Met: []? Not Met: []? Adjusted       Overall Progression Towards Functional goals/ Treatment Progress Update:  [] Patient is progressing as expected towards functional goals listed. [] Progression is slowed due to complexities/Impairments listed. [] Progression has been slowed due to co-morbidities.   [x] Plan just implemented, too soon to assess goals progression <30days   [] Goals require adjustment due to lack of

## 2021-04-22 ENCOUNTER — HOSPITAL ENCOUNTER (OUTPATIENT)
Dept: PHYSICAL THERAPY | Age: 61
Setting detail: THERAPIES SERIES
Discharge: HOME | End: 2021-04-22
Payer: COMMERCIAL

## 2021-04-27 ENCOUNTER — HOSPITAL ENCOUNTER (OUTPATIENT)
Dept: PHYSICAL THERAPY | Age: 61
Setting detail: THERAPIES SERIES
Discharge: HOME | End: 2021-04-27
Payer: COMMERCIAL

## 2021-04-27 PROCEDURE — 97140 MANUAL THERAPY 1/> REGIONS: CPT

## 2021-04-27 PROCEDURE — 97110 THERAPEUTIC EXERCISES: CPT

## 2021-04-27 NOTE — FLOWSHEET NOTE
Physical Therapy Daily Treatment Note  Date:  2021    Patient Name:  Josiane Smith    :  1960  MRN: [id number]      Medical/Treatment Diagnosis Information:  ·   Biceps Tendinitis of L UE M75.22     ·   Insurance/Certification information:   [employer]    Physician Information:   [address] of care signed (Y/N): Sent 4/15/21    Date of Patient follow up with Physician: BRITTANY    Is this a Progress Report:     []  Yes  [x]  No      If Yes:  Date Range for reporting period:  Beginning  Ending    Progress report will be due (10 Rx or 30 days whichever is less):   Recertification will be due (POC Duration  / 90 days whichever is less): 7/15/21        Visit # Insurance Allowable Auth Required   3/ 12 20 per  year []  Yes [x]  No        Functional Scale: Spadi      Date assessed: 4/15/21       Latex Allergy:  [x]NO      []YES  Preferred Language for Healthcare:   [x]English       []other:    Pain level: 1/10     SUBJECTIVE:    Pt in distress in waiting room. States that the pain is in his back. States that he has an appt MAY 5 with pain management, for a consult regarding injections. Not currently in therapy for his back. ALso feels that the exercises are making his arm even weaker. He is in pain in his arm and neck , and has to really concentrate to raise his arm. Feels like his arm is a lead weight. Therapist notes that the arm is hanging unattended to the side. Digits 4 and 5 are tingling all the time. The exercises hurt and make him not want to use his arm the rest of the day. The only thing that helps his pain is to lay down. Pt presents with C/o L UE arm pain. Feels like his entire L UE is wrapped in an ace bandage. Feels week and tired. He is a , gone 2-3 months at a time. He is taking the next 4-6 weeks off to try to recover from several injuries. Was recently (March) in the hospital for a kidney stone. Has back and hip pain.    He did keep working every day through the covid crisis. Feels pretty good in the am.  The longer into the day it gets, the worse it hurts. 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 Drives 11 hours every day. Sleeps in his truck. Household chores make his pain go eric high. Entire shoulder, anterior pectoralis, triceps area and 4th/5th digit. Has always slept on his LEFT side, but the past 2-3 months, he has needed to sleep on his Right.        Plan Moving Forward/ For next visit:    Assess tolerance to HEP  . Progress as tolerated   Assess tolerance to thoracic manual techniques from last visit. Repeat as needed.  Trial US to shoulder region for pain modulation as indicated   Progress HEP       RESTRICTIONS/PRECAUTIONS:   no restrictions noted    OBJECTIVE:   Assessment of cervical spine to R/O additional etiology:   Cervical ROM WNL Rotation/ Flexion/ Extension   Cervical distraction :  No change in symptoms x 3 min   Strength :  UE discrepancy R vs L noted, however L UE strength rates 4 to 4+/5 today  Alignment:  Neutral cervical spine noted     (+) Extension torsion noted of thoracic spine and rib at T5    Exercises/Interventions:     Exercises in bold performed in department today. Items not bolded are carried forward from prior visits for continuity of the record.   Exercise/Equipment Resistance/Repetitions HEP Other comments       []    Supine AAROM shoulder flexion    x10 [x]    Table slide    x10  [x]    Adduction Isometric   x10 Submax  [x]        []        []    Mid Row    2x10  [x] YTB  HOLD due to return of pain   Low Row    2x10  [x] YTB   ADD with GTB   2x10 [x] YTB         []    Barrel stretch     20 sec x 3  [x]        []        []        []        []        []        []        []      Therapeutic Exercise/Home Exercise Program:   20 minutes  Pt education provided regarding anatomy and physiology associated with dx, etiology of Symptoms and plan of care. All questions answered to pt satisfaction. See above for HEP . Pt education regarding Rib and thoracic spine contribution to symptoms. Hold Theraband ex. Provided Barrel stretch . Therapeutic Activity:  0 minutes     Gait: 0 minutes    Neuromuscular Re-Education:  0 minutes      Canalith Repositioning Procedure:      Manual Therapy:  20 minutes  Cervical distraction:  Cervical distraction with Right lateral flexion x 3 min. Marked point tenderness to L medial scapular border at level of T5. MET in sidelying to reduce thoracic rib torsion. Pt reports immediate relief in sidelying position. Rib mobilization with thoracic stretch. Pt reports arm feels \"Lighter\" and Digits 4/5 no longer \"tingly\" after tx. Decreased shoulder point tenderness noted. Gentle shoulder distraction/ MFR unwind  Deep tendon release to L UT/ Subscapularis/ Pectoralis   Gentle joint mobilization GR I, II   Limited tolerance to manual techniques due to pain    Modalities: 0 minutes  US to L shoulder: 50%,  2.0 w/cm2; 10 min        Therapeutic Exercise and NMR EXR  [x] (45775) Provided verbal/tactile cueing for activities related to strengthening, flexibility, endurance, ROM  for improvements in proximal hip and core control with self care, mobility, lifting and ambulation.  [] (89549) Provided verbal/tactile cueing for activities related to improving balance, coordination, kinesthetic sense, posture, motor skill, proprioception  to assist with core control in self care, mobility, lifting, and ambulation.      Therapeutic Activities:    [] (34438 or 89303) Provided verbal/tactile cueing for activities related to improving balance, coordination, kinesthetic sense, posture, motor skill, proprioception and motor activation to allow for proper function  with self care and ADLs  [] (26349) Provided training and instruction to the patient for proper core and proximal hip recruitment and positioning with ambulation re-education     Home Exercise Program:    [x] (67436) Reviewed/Progressed HEP activities related to strengthening, flexibility, endurance, ROM of core, proximal hip and LE for functional self-care, mobility, lifting and ambulation   [] (86208) Reviewed/Progressed HEP activities related to improving balance, coordination, kinesthetic sense, posture, motor skill, proprioception of core, proximal hip and LE for self care, mobility, lifting, and ambulation      Manual Treatments:  PROM / STM / Oscillations-Mobs:  G-I, II, III, IV (PA's, Inf., Post.)  [x] (70724) Provided manual therapy to mobilize proximal hip and LS spine soft tissue/joints for the purpose of modulating pain, promoting relaxation,  increasing ROM, reducing/eliminating soft tissue swelling/inflammation/restriction, improving soft tissue extensibility and allowing for proper ROM for normal function with self care, mobility, lifting and ambulation. Modalities:       Charges:  Timed Code Treatment Minutes: 40   Total Treatment Minutes: 40     [] EVAL  [x] AZ(18772) x   1  [] US  [] NMR (89972) x     [] VASO  [x] Manual (65540) x2   [] Other:  [] TA x     [] Gait x   [] Mech Traction (27305)  [] ES(attended) (49624)     [] ES (un) (51298):         ASSESSMENT:  Pt presents with return of pain this date. Primary c/o is back pain. Cervical / thoracic assessment performed revealing (+) thoracic rib torsion at level of T5. Manual techniques applied with positive response. Decreased N/T in L hand and decreased shoulder pain at end of tx. Will continue to monitor and progress HEP as indicated. GOALS:  Patient stated goal: Get rid of this pain and weakness in my arm   []? Progressing: []? Met: []? Not Met: []? Adjusted     Therapist goals for Patient:   Short Term Goals: To be achieved in: 2 weeks  1. Independent in HEP and progression per patient tolerance, in order to prevent re-injury. []? Progressing: []? Met: []? Not Met: []? Adjusted  2.  Patient will have a decrease in pain to facilitate improvement in movement, function, and ADLs as indicated by Functional Deficits. []? Progressing: []? Met: []? Not Met: []? Adjusted     Long Term Goals: To be achieved in: 6 weeks  1. Disability index score of 10% or less for the Quick DASH to assist with reaching prior level of function. []? Progressing: []? Met: []? Not Met: []? Adjusted  2. Patient will demonstrate increased AROM to L UE to allow for proper joint functioning as indicated by Functional Deficits. []? Progressing: []? Met: []? Not Met: []? Adjusted  3. Patient will demonstrate an increase in NM recruitment/activation and overall GH and scapular strength to within 5lbs HHD or WNL for proper functional mobility as indicated by patients Functional Deficits. []? Progressing: []? Met: []? Not Met: []? Adjusted  4. Patient will return to all activities of work/ driving  without increased symptoms or restriction. []? Progressing: []? Met: []? Not Met: []? Adjusted  5.  []? Progressing: []? Met: []? Not Met: []? Adjusted       Overall Progression Towards Functional goals/ Treatment Progress Update:  [] Patient is progressing as expected towards functional goals listed. [] Progression is slowed due to complexities/Impairments listed. [] Progression has been slowed due to co-morbidities.   [x] Plan just implemented, too soon to assess goals progression <30days   [] Goals require adjustment due to lack of progress  [] Patient is not progressing as expected and requires additional follow up with physician  [] Other    Prognosis for POC: [x] Good [] Fair  [] Poor      Patient requires continued skilled intervention: [x] Yes  [] No    Treatment/Activity Tolerance:  [x] Patient able to complete treatment  [] Patient limited by fatigue  [x] Patient limited by pain    [] Patient limited by other medical complications  [] Other:         PLAN: See eval  [x] Continue per plan of care [] Alter current plan (see comments above)  [] Plan of care initiated [] Hold pending MD visit [] Discharge        Electronically signed by: Becky Yu PT, MPT, OMT-c [id number]      Note: If patient does not return for scheduled/ recommended follow up visits, this note will serve as a discharge from care along with most recent update on progress.

## 2021-04-29 ENCOUNTER — HOSPITAL ENCOUNTER (OUTPATIENT)
Dept: PHYSICAL THERAPY | Age: 61
Setting detail: THERAPIES SERIES
Discharge: HOME | End: 2021-04-29
Payer: COMMERCIAL

## 2021-04-29 PROCEDURE — 97140 MANUAL THERAPY 1/> REGIONS: CPT

## 2021-04-29 PROCEDURE — 97110 THERAPEUTIC EXERCISES: CPT

## 2021-04-29 PROCEDURE — 97012 MECHANICAL TRACTION THERAPY: CPT

## 2021-04-29 NOTE — FLOWSHEET NOTE
Physical Therapy Daily Treatment Note  Date:  2021    Patient Name:  Micaela Gordillo    :  1960  MRN: [id number]      Medical/Treatment Diagnosis Information:  ·   Biceps Tendinitis of L UE M75.22     ·   Insurance/Certification information:   [facility]    Physician Information:   [address] of care signed (Y/N): Sent 4/15/21    Date of Patient follow up with Physician: BRITTANY    Is this a Progress Report:     []  Yes  [x]  No      If Yes:  Date Range for reporting period:  Beginning  Ending    Progress report will be due (10 Rx or 30 days whichever is less): 05  Recertification will be due (POC Duration  / 90 days whichever is less): 7/15/21        Visit # Insurance Allowable Auth Required    per  year []  Yes [x]  No        Functional Scale: Spadi      Date assessed: 4/15/21       Latex Allergy:  [x]NO      []YES  Preferred Language for Healthcare:   [x]English       []other:    Pain level:  5-6/10 for entire body     SUBJECTIVE:   Pt states that he is unchanged. He did his exercises several times yesterday but is not sure they are helping. Got his pain management appt moved to Monday AM, to address his low back pain. States that his arm did feel a little but \"lighter\" after the last visit, but that the weakness and N/T remain. Greatest complaint remains low back. Pt presents with C/o L UE arm pain. Feels like his entire L UE is wrapped in an ace bandage. Feels week and tired. He is a , gone 2-3 months at a time. He is taking the next 4-6 weeks off to try to recover from several injuries. Was recently (March) in the hospital for a kidney stone. Has back and hip pain. He did keep working every day through the covid crisis. Feels pretty good in the am.  The longer into the day it gets, the worse it hurts. 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 Drives 11 hours every day.   Sleeps Activity:  0 minutes     Gait: 0 minutes    Neuromuscular Re-Education:  0 minutes      Canalith Repositioning Procedure:      Manual Therapy:  10 minutes  Cervical distraction:  Cervical distraction with Right lateral flexion x 3 min. Marked point tenderness to L medial scapular border at level of T5. MET in sidelying to reduce thoracic rib torsion. Pt reports immediate relief in sidelying position. Rib mobilization with thoracic stretch. Pt reports arm feels \"Lighter\" and Digits 4/5 no longer \"tingly\" after tx. Decreased shoulder point tenderness noted. Gentle shoulder distraction/ MFR unwind  Deep tendon release to L UT/ Subscapularis/ Pectoralis   Gentle joint mobilization GR I, II   Limited tolerance to manual techniques due to pain    Modalities: 15minutes  Trial #1 Cervical Txn.   30# max pull,  15# min pull. 15 min. Pt limited by back pain in hooklying position. Denies change in L UE symptoms at end of session, however distracted by significant increased in low back pain. US to L shoulder: 50%,  2.0 w/cm2; 10 min        Therapeutic Exercise and NMR EXR  [x] (91471) Provided verbal/tactile cueing for activities related to strengthening, flexibility, endurance, ROM  for improvements in proximal hip and core control with self care, mobility, lifting and ambulation.  [] (89937) Provided verbal/tactile cueing for activities related to improving balance, coordination, kinesthetic sense, posture, motor skill, proprioception  to assist with core control in self care, mobility, lifting, and ambulation.      Therapeutic Activities:    [] (30532 or 14172) Provided verbal/tactile cueing for activities related to improving balance, coordination, kinesthetic sense, posture, motor skill, proprioception and motor activation to allow for proper function  with self care and ADLs  [] (57798) Provided training and instruction to the patient for proper core and proximal hip recruitment and positioning with progression per patient tolerance, in order to prevent re-injury. []? Progressing: []? Met: []? Not Met: []? Adjusted  2. Patient will have a decrease in pain to facilitate improvement in movement, function, and ADLs as indicated by Functional Deficits. []? Progressing: []? Met: []? Not Met: []? Adjusted     Long Term Goals: To be achieved in: 6 weeks  1. Disability index score of 10% or less for the Quick DASH to assist with reaching prior level of function. []? Progressing: []? Met: []? Not Met: []? Adjusted  2. Patient will demonstrate increased AROM to L UE to allow for proper joint functioning as indicated by Functional Deficits. []? Progressing: []? Met: []? Not Met: []? Adjusted  3. Patient will demonstrate an increase in NM recruitment/activation and overall GH and scapular strength to within 5lbs HHD or WNL for proper functional mobility as indicated by patients Functional Deficits. []? Progressing: []? Met: []? Not Met: []? Adjusted  4. Patient will return to all activities of work/ driving  without increased symptoms or restriction. []? Progressing: []? Met: []? Not Met: []? Adjusted  5.  []? Progressing: []? Met: []? Not Met: []? Adjusted       Overall Progression Towards Functional goals/ Treatment Progress Update:  [] Patient is progressing as expected towards functional goals listed. [] Progression is slowed due to complexities/Impairments listed. [] Progression has been slowed due to co-morbidities.   [x] Plan just implemented, too soon to assess goals progression <30days   [] Goals require adjustment due to lack of progress  [] Patient is not progressing as expected and requires additional follow up with physician  [] Other    Prognosis for POC: [x] Good [] Fair  [] Poor      Patient requires continued skilled intervention: [x] Yes  [] No    Treatment/Activity Tolerance:  [x] Patient able to complete treatment  [] Patient limited by fatigue  [x] Patient limited by pain    [] Patient limited by other medical complications  [] Other:         PLAN: See eval  [x] Continue per plan of care [] Alter current plan (see comments above)  [] Plan of care initiated [] Hold pending MD visit [] Discharge        Electronically signed by: Celine Paul PT, MPT, OMT-c [id number]      Note: If patient does not return for scheduled/ recommended follow up visits, this note will serve as a discharge from care along with most recent update on progress.

## 2021-05-03 ENCOUNTER — HOSPITAL ENCOUNTER (OUTPATIENT)
Age: 61
Discharge: HOME | End: 2021-05-03
Payer: COMMERCIAL

## 2021-05-03 ENCOUNTER — HOSPITAL ENCOUNTER (OUTPATIENT)
Dept: GENERAL RADIOLOGY | Age: 61
Discharge: HOME | End: 2021-05-03
Payer: COMMERCIAL

## 2021-05-03 DIAGNOSIS — M54.17 RADICULOPATHY OF LUMBOSACRAL REGION: ICD-10-CM

## 2021-05-03 PROCEDURE — 72110 X-RAY EXAM L-2 SPINE 4/>VWS: CPT

## 2021-05-04 ENCOUNTER — HOSPITAL ENCOUNTER (OUTPATIENT)
Dept: PHYSICAL THERAPY | Age: 61
Setting detail: THERAPIES SERIES
Discharge: HOME | End: 2021-05-04
Payer: COMMERCIAL

## 2021-05-04 PROCEDURE — 97110 THERAPEUTIC EXERCISES: CPT

## 2021-05-04 PROCEDURE — 97035 APP MDLTY 1+ULTRASOUND EA 15: CPT

## 2021-05-04 NOTE — FLOWSHEET NOTE
Physical Therapy Daily Treatment Note  Date:  2021    Patient Name:  Darien Izaguirre    :  1960  MRN: [id number]      Medical/Treatment Diagnosis Information:  ·   Biceps Tendinitis of L UE M75.22     ·   Insurance/Certification information:   [facility]    Physician Information:   [address] of care signed (Y/N): Sent 4/15/21    Date of Patient follow up with Physician: BRITTANY    Is this a Progress Report:     []  Yes  [x]  No      If Yes:  Date Range for reporting period:  Beginning  Ending    Progress report will be due (10 Rx or 30 days whichever is less):   Recertification will be due (POC Duration  / 90 days whichever is less): 7/15/21        Visit # Insurance Allowable Auth Required    per  year []  Yes [x]  No        Functional Scale: Spadi      Date assessed: 4/15/21       Latex Allergy:  [x]NO      []YES  Preferred Language for Healthcare:   [x]English       []other:    Pain level:  4/10 harish     SUBJECTIVE:   Pt states that he went to see the Pain management MD.  Informed MD that his arm is painful in the shoulder but also just feels weak and heavy all the way to his hand. MD has ordered an MRI of the neck and shoulder. Suspects a pinched nerve and OA. Cont limited by severe back pain as well. Ok to proceed with shoulder tx. Pt presents with C/o L UE arm pain. Feels like his entire L UE is wrapped in an ace bandage. Feels week and tired. He is a , gone 2-3 months at a time. He is taking the next 4-6 weeks off to try to recover from several injuries. Was recently (March) in the hospital for a kidney stone. Has back and hip pain. He did keep working every day through the covid crisis. Feels pretty good in the am.  The longer into the day it gets, the worse it hurts. 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 Drives 11 hours every day. Sleeps in his truck.    Household ROM  for improvements in proximal hip and core control with self care, mobility, lifting and ambulation.  [] (07826) Provided verbal/tactile cueing for activities related to improving balance, coordination, kinesthetic sense, posture, motor skill, proprioception  to assist with core control in self care, mobility, lifting, and ambulation. Therapeutic Activities:    [] (70732 or 67177) Provided verbal/tactile cueing for activities related to improving balance, coordination, kinesthetic sense, posture, motor skill, proprioception and motor activation to allow for proper function  with self care and ADLs  [] (91618) Provided training and instruction to the patient for proper core and proximal hip recruitment and positioning with ambulation re-education     Home Exercise Program:    [x] (69795) Reviewed/Progressed HEP activities related to strengthening, flexibility, endurance, ROM of core, proximal hip and LE for functional self-care, mobility, lifting and ambulation   [] (79645) Reviewed/Progressed HEP activities related to improving balance, coordination, kinesthetic sense, posture, motor skill, proprioception of core, proximal hip and LE for self care, mobility, lifting, and ambulation      Manual Treatments:  PROM / STM / Oscillations-Mobs:  G-I, II, III, IV (PA's, Inf., Post.)  [x] (68559) Provided manual therapy to mobilize proximal hip and LS spine soft tissue/joints for the purpose of modulating pain, promoting relaxation,  increasing ROM, reducing/eliminating soft tissue swelling/inflammation/restriction, improving soft tissue extensibility and allowing for proper ROM for normal function with self care, mobility, lifting and ambulation.      Modalities:       Charges:  Timed Code Treatment Minutes: 45   Total Treatment Minutes: 45     [] EVAL  [x] LJ(19810) x   1  [] US  [] NMR (09517) x     [] VASO  [x] Manual (82911) x1   [] Other:  [] TA x     [] Gait x   [x] Mech Traction (70349)  [] ES(attended) (58995)     [] ES (un) (09549):         ASSESSMENT:  Pt presents having seen pain management MD, stating pending order for cervical MRI. OK to proceed with UE therapy to address weakness and pain in shoulder. Tolerated tx well in sitting position today. Progressed HEP to resume scapular exercises, added lower arm and wrist strengthening. GOALS:  Patient stated goal: Get rid of this pain and weakness in my arm   []? Progressing: []? Met: []? Not Met: []? Adjusted     Therapist goals for Patient:   Short Term Goals: To be achieved in: 2 weeks  1. Independent in HEP and progression per patient tolerance, in order to prevent re-injury. []? Progressing: []? Met: []? Not Met: []? Adjusted  2. Patient will have a decrease in pain to facilitate improvement in movement, function, and ADLs as indicated by Functional Deficits. []? Progressing: []? Met: []? Not Met: []? Adjusted     Long Term Goals: To be achieved in: 6 weeks  1. Disability index score of 10% or less for the Quick DASH to assist with reaching prior level of function. []? Progressing: []? Met: []? Not Met: []? Adjusted  2. Patient will demonstrate increased AROM to L UE to allow for proper joint functioning as indicated by Functional Deficits. []? Progressing: []? Met: []? Not Met: []? Adjusted  3. Patient will demonstrate an increase in NM recruitment/activation and overall GH and scapular strength to within 5lbs HHD or WNL for proper functional mobility as indicated by patients Functional Deficits. []? Progressing: []? Met: []? Not Met: []? Adjusted  4. Patient will return to all activities of work/ driving  without increased symptoms or restriction. []? Progressing: []? Met: []? Not Met: []? Adjusted  5.  []? Progressing: []? Met: []? Not Met: []? Adjusted       Overall Progression Towards Functional goals/ Treatment Progress Update:  [] Patient is progressing as expected towards functional goals listed.     [x] Progression is slowed due to complexities/Impairments listed. Back and cervical pain  [x] Progression has been slowed due to co-morbidities. [] Plan just implemented, too soon to assess goals progression <30days   [] Goals require adjustment due to lack of progress  [] Patient is not progressing as expected and requires additional follow up with physician  [] Other    Prognosis for POC: [x] Good [] Fair  [] Poor      Patient requires continued skilled intervention: [x] Yes  [] No    Treatment/Activity Tolerance:  [x] Patient able to complete treatment  [] Patient limited by fatigue  [x] Patient limited by pain    [] Patient limited by other medical complications  [] Other:         PLAN: See eval  [x] Continue per plan of care [] Alter current plan (see comments above)  [] Plan of care initiated [] Hold pending MD visit [] Discharge        Electronically signed by: Sal Connor PT, MPT, OMT-c [id number]      Note: If patient does not return for scheduled/ recommended follow up visits, this note will serve as a discharge from care along with most recent update on progress.

## 2021-05-06 ENCOUNTER — HOSPITAL ENCOUNTER (OUTPATIENT)
Dept: PHYSICAL THERAPY | Age: 61
Setting detail: THERAPIES SERIES
End: 2021-05-06
Payer: COMMERCIAL

## 2021-05-13 ENCOUNTER — HOSPITAL ENCOUNTER (OUTPATIENT)
Dept: MRI IMAGING | Age: 61
Discharge: HOME | End: 2021-05-13
Payer: COMMERCIAL

## 2021-05-13 DIAGNOSIS — M54.12 CERVICAL RADICULOPATHY: ICD-10-CM

## 2021-05-13 PROCEDURE — 72141 MRI NECK SPINE W/O DYE: CPT

## 2021-06-14 ENCOUNTER — OFFICE VISIT (OUTPATIENT)
Dept: FAMILY MEDICINE CLINIC | Age: 61
End: 2021-06-14
Payer: COMMERCIAL

## 2021-06-14 VITALS
SYSTOLIC BLOOD PRESSURE: 132 MMHG | HEIGHT: 70 IN | BODY MASS INDEX: 45.1 KG/M2 | HEART RATE: 55 BPM | RESPIRATION RATE: 16 BRPM | OXYGEN SATURATION: 98 % | WEIGHT: 315 LBS | DIASTOLIC BLOOD PRESSURE: 84 MMHG

## 2021-06-14 DIAGNOSIS — M25.50 POLYARTHRALGIA: ICD-10-CM

## 2021-06-14 DIAGNOSIS — R45.86 MOOD SWINGS: ICD-10-CM

## 2021-06-14 DIAGNOSIS — E66.01 CLASS 3 SEVERE OBESITY DUE TO EXCESS CALORIES WITH SERIOUS COMORBIDITY AND BODY MASS INDEX (BMI) OF 50.0 TO 59.9 IN ADULT (HCC): ICD-10-CM

## 2021-06-14 DIAGNOSIS — E03.9 ACQUIRED HYPOTHYROIDISM: ICD-10-CM

## 2021-06-14 DIAGNOSIS — Z12.11 COLON CANCER SCREENING: ICD-10-CM

## 2021-06-14 DIAGNOSIS — I10 ESSENTIAL HYPERTENSION: ICD-10-CM

## 2021-06-14 DIAGNOSIS — E11.9 CONTROLLED TYPE 2 DIABETES MELLITUS WITHOUT COMPLICATION, WITHOUT LONG-TERM CURRENT USE OF INSULIN (HCC): ICD-10-CM

## 2021-06-14 DIAGNOSIS — E78.5 HYPERLIPIDEMIA WITH TARGET LDL LESS THAN 100: ICD-10-CM

## 2021-06-14 DIAGNOSIS — E11.9 CONTROLLED TYPE 2 DIABETES MELLITUS WITHOUT COMPLICATION, WITHOUT LONG-TERM CURRENT USE OF INSULIN (HCC): Primary | ICD-10-CM

## 2021-06-14 DIAGNOSIS — M54.17 LUMBOSACRAL RADICULOPATHY: ICD-10-CM

## 2021-06-14 LAB
CREATININE URINE: 138.4 MG/DL (ref 39–259)
MICROALBUMIN UR-MCNC: 2.1 MG/DL
MICROALBUMIN/CREAT UR-RTO: 15.2 MG/G (ref 0–30)
TSH SERPL DL<=0.05 MIU/L-ACNC: 5.66 UIU/ML (ref 0.27–4.2)

## 2021-06-14 PROCEDURE — 3044F HG A1C LEVEL LT 7.0%: CPT | Performed by: FAMILY MEDICINE

## 2021-06-14 PROCEDURE — 2022F DILAT RTA XM EVC RTNOPTHY: CPT | Performed by: FAMILY MEDICINE

## 2021-06-14 PROCEDURE — G8427 DOCREV CUR MEDS BY ELIG CLIN: HCPCS | Performed by: FAMILY MEDICINE

## 2021-06-14 PROCEDURE — 3017F COLORECTAL CA SCREEN DOC REV: CPT | Performed by: FAMILY MEDICINE

## 2021-06-14 PROCEDURE — 1036F TOBACCO NON-USER: CPT | Performed by: FAMILY MEDICINE

## 2021-06-14 PROCEDURE — 99214 OFFICE O/P EST MOD 30 MIN: CPT | Performed by: FAMILY MEDICINE

## 2021-06-14 PROCEDURE — G8417 CALC BMI ABV UP PARAM F/U: HCPCS | Performed by: FAMILY MEDICINE

## 2021-06-14 RX ORDER — ETODOLAC 400 MG/1
TABLET, FILM COATED ORAL
Qty: 180 TABLET | Refills: 1 | Status: SHIPPED | OUTPATIENT
Start: 2021-06-14 | End: 2021-12-17 | Stop reason: SDUPTHER

## 2021-06-14 RX ORDER — BENAZEPRIL HYDROCHLORIDE 40 MG/1
40 TABLET, FILM COATED ORAL DAILY
Qty: 90 TABLET | Refills: 1 | Status: SHIPPED | OUTPATIENT
Start: 2021-06-14 | End: 2021-12-16

## 2021-06-14 NOTE — PROGRESS NOTES
DIABETES MELLITUS FOLOW-UP  Subjective:      Chief Complaint   Patient presents with    Diabetes     Haley Quinn is an 56 y.o. male who presents for follow up of following chronic problems:  1. Controlled type 2 diabetes mellitus without complication, without long-term current use of insulin ([facility] [address]    2. Essential hypertension    3. Hyperlipidemia with target LDL less than 100    4. Colon cancer screening    5. Mood swings    6. Lumbosacral radiculopathy-L5 by EMG, Epidural spring 2011    7. Class 3 severe obesity due to excess calories with serious comorbidity and body mass index (BMI) of 50.0 to 59.9 in adult ([address]    8. Acquired hypothyroidism    9. Polyarthralgia      Complaints: pt has been having nerve blocks and back injections, and lumbar puncture. He is aggrivated depressed upset. He hasn't been checking his sugars, he has been fasting though. Planning another nerve block. · Patient checks sugars 0  time(s) daily. · Any low sugars? No  · Patent follows diabetic diet? No  · Exercise: walking never  · Taking medicines daily as directed? Yes  · Is the patient reporting any side effects of medications? No  · Patient checks bottom of feet daily? Yes  · Tobacco history updated:  reports that he has never smoked. He has never used smokeless tobacco.      Review of Systems   General ROS: fever? No,   Night sweats? No  Ophthalmic ROS: change in vision? No  Endocrine ROS: fatigue? Yes  Unexpected weight changes? No  Respiratory ROS: Shortness of breath? No  Cardiovascular ROS: chest pain? No  Gastrointestinal ROS: abdominal pain? No  Change in stools? No  Genito-Urinary ROS: painful urination? No  Trouble urinating? No  Neurological ROS: TIA or stroke symptoms? No  Numbness/tingling? No  Dermatological ROS: rash or sores on feet? No  Changes in skin spots?     No    Health Maintenance Due   Topic Date Due    Diabetic retinal exam  Never done    COVID-19 Vaccine (1) Never Tobacco smoker: No      Systolic Blood Pressure: 001 mmHg      Is BP treated: Yes      HDL Cholesterol: 43 mg/dL      Total Cholesterol: 144 mg/dL  Prior to Visit Medications    Medication Sig Taking? Authorizing Provider   etodolac (LODINE) 400 MG tablet TAKE ONE TABLET BY MOUTH TWICE DAILY AS NEEDED Yes Brina Goyal MD   benazepril (LOTENSIN) 40 MG tablet Take 1 tablet by mouth daily Yes Brina Goyal MD   omeprazole (PRILOSEC) 20 MG delayed release capsule Take 1 capsule by mouth every morning (before breakfast) Yes Korin Scott MD   amLODIPine (NORVASC) 5 MG tablet Take 1 tablet by mouth daily Yes KAMILLE Terrell CNP   amitriptyline (ELAVIL) 50 MG tablet Take 1 tablet by mouth nightly Yes KAMILLE Terrell CNP   metFORMIN (GLUCOPHAGE) 1000 MG tablet Take 2 tablets by mouth daily (with breakfast) Yes KAMILLE Terrell CNP   metoprolol succinate (TOPROL XL) 50 MG extended release tablet Take 1 tablet by mouth daily Yes KAMILLE Terrell CNP   pravastatin (PRAVACHOL) 20 MG tablet Take 1 tablet by mouth nightly Yes KAMILLE Terrell CNP   ibuprofen (ADVIL;MOTRIN) 800 MG tablet Take 0.5 tablets by mouth every 8 hours as needed for Pain Yes KAMILLE Peng NP   aspirin 81 MG tablet Take 81 mg by mouth daily. Yes Historical Provider, MD   multivitamin [facility]) per tablet Take 1 tablet by mouth daily.    Yes Historical Provider, MD      Family History   Problem Relation Age of Onset    Alcohol Abuse Mother     Diabetes Father     Heart Disease Father     Stroke Father     Kidney Disease Other     Diabetes Sister     Stroke Sister     Coronary Art Dis Sister      Objective:   PHYSICAL EXAM   /84 (Site: Left Upper Arm, Position: Sitting, Cuff Size: Large Adult)   Pulse 55   Resp 16   Ht 5' 10\" (1.778 m)   Wt (!) 339 lb (153.8 kg)   SpO2 98%   BMI 48.64 kg/m²   BP Readings from Last 5 Encounters:   06/14/21 132/84   04/12/21 (!) 144/94 04/07/21 138/80   03/17/21 (!) 141/71   03/16/21 (!) 145/88     Wt Readings from Last 5 Encounters:   06/14/21 (!) 339 lb (153.8 kg)   04/12/21 (!) 347 lb (157.4 kg)   04/07/21 (!) 346 lb (156.9 kg)   03/16/21 (!) 355 lb (161 kg)   08/20/20 (!) 349 lb (158.3 kg)      GENERAL:   · well-developed, obesed, alert, rocking in seat due to pIN. EYES:   · External findings: lids and lashes normal and conjunctivae and sclerae normal  LUNGS:    · Breathing unlabored  · clear to auscultation bilaterally and good air movement  CARDIOVASC:   · regular rate and rhythm  SKIN: warm and dry  PSYCH:    · Alert and oriented  · Normal reasoning, insight good  · Facial expressions full, mood appropriate  · No memory disturbance noted  MUSCULOSKEL:    · No significant finger or nail findings  NEURO:   · CN 2-12 intact  Diabetic Foot Exam  · Foot exam reveals normal cap refill  · Feet normal skin color, no callouses, no ulcers, no venous stasis  · Feet- no deformities  · sensation grossly normal to light touch in feet. FEET:Monofilament Sensation:  normal      Assessment and Plan:      Diagnosis Orders   1. Controlled type 2 diabetes mellitus without complication, without long-term current use of insulin ([facility])   DIABETES FOOT EXAM     DIABETES EYE EXAM    Microalbumin / Creatinine Urine Ratio    Hemoglobin A1C   2. Essential hypertension  benazepril (LOTENSIN) 40 MG tablet   3. Hyperlipidemia with target LDL less than 100     4. Colon cancer screening  POCT Fecal Immunochemical Test (FIT)   5. Mood swings     6. Lumbosacral radiculopathy-L5 by EMG, Epidural spring 2011     7. Class 3 severe obesity due to excess calories with serious comorbidity and body mass index (BMI) of 50.0 to 59.9 in adult ([facility] [address]     8. Acquired hypothyroidism  TSH without Reflex   9. Polyarthralgia  etodolac (LODINE) 400 MG tablet   Stable. Continue current Tx plan. Any changes marked below.     INSTRUCTIONS  NEXT APPOINTMENT: Please schedule check-up in 3 months. · PLEASE TAKE THIS FORM TO CHECK-OUT WINDOW TO SCHEDULE NEXT VISIT. · PLEASE GET BLOODWORK DRAWN TODAY ON FIRST FLOOR in 170. Take orders with you. RESULTS- most blood tests back in couple days. We will call you if any problems. If bloodwork good, you will get letter in mail or notified thru [address] (if signed up) within 2 weeks. If you do not, please call office. · Please get annual dilated eye exam to screen for diabetic retinopathy which can lead to vision loss. Ask for report to be faxed to [phone]. · Please do stool dip screen and return/mail back to office for colon cancer screening. · OK to get Pfizer or Moderna COVID vaccine (NOT J+J) but should wait for 30 minutes after.   COVID VACCINE- call [employer]  [phone]

## 2021-06-14 NOTE — PATIENT INSTRUCTIONS
INSTRUCTIONS  NEXT APPOINTMENT: Please schedule check-up in 3 months. · PLEASE TAKE THIS FORM TO CHECK-OUT WINDOW TO SCHEDULE NEXT VISIT. · PLEASE GET BLOODWORK DRAWN TODAY ON FIRST FLOOR in 170. Take orders with you. RESULTS- most blood tests back in couple days. We will call you if any problems. If bloodwork good, you will get letter in mail or notified thru [address] (if signed up) within 2 weeks. If you do not, please call office. · Please get annual dilated eye exam to screen for diabetic retinopathy which can lead to vision loss. Ask for report to be faxed to [phone]. · Please do stool dip screen and return/mail back to office for colon cancer screening. · OK to get Pfizer or Moderna COVID vaccine (NOT J+J) but should wait for 30 minutes after. COVID VACCINE- call [employer] Vaccine Hotline  [phone]  Please do stool dip screen and return/mail back to office for colon cancer screening.

## 2021-06-15 ENCOUNTER — CLINICAL DOCUMENTATION (OUTPATIENT)
Dept: OTHER | Age: 61
End: 2021-06-15

## 2021-06-15 LAB
ESTIMATED AVERAGE GLUCOSE: 137 MG/DL
HBA1C MFR BLD: 6.4 %

## 2021-06-17 DIAGNOSIS — E03.9 ACQUIRED HYPOTHYROIDISM: Primary | ICD-10-CM

## 2021-06-17 RX ORDER — LEVOTHYROXINE SODIUM 0.03 MG/1
25 TABLET ORAL DAILY
Qty: 90 TABLET | Refills: 1 | Status: SHIPPED | OUTPATIENT
Start: 2021-06-17 | End: 2021-11-03

## 2021-08-04 ENCOUNTER — TELEPHONE (OUTPATIENT)
Dept: FAMILY MEDICINE CLINIC | Age: 61
End: 2021-08-04

## 2021-08-04 NOTE — TELEPHONE ENCOUNTER
Was changed from atenolol to metoprolol 9/18/2017 when his pharmacy stopped carrying it. Is there some reason he wants to switch back after 4 years?

## 2021-08-04 NOTE — TELEPHONE ENCOUNTER
Pt calling stating he would like atenolol 50 mg refilled and sent to [facility] on [address] in [facility]. Advised Pt did not see medication and asked if it would be a different name. Looked in chart stated that toprol 50 mg is in place of atenolol. Pt stated it was not toprol that it is atenolol.

## 2021-08-04 NOTE — TELEPHONE ENCOUNTER
Called pt and he was mistaken he doesn't he doesn't need any refills at this time.   He is not taking the atenolol

## 2021-09-10 ENCOUNTER — COMMUNITY OUTREACH (OUTPATIENT)
Dept: OTHER | Age: 61
End: 2021-09-10

## 2021-09-10 NOTE — PROGRESS NOTES
PP-SW spoke with patient on this date as referred by previous patient. SW and patient discussed current income and household income. Patient reports recently losing insurance and switching to [employer]. SW reviewed eligibility for special enrollment period through [employer]. SW to look at plan options and follow up with patient on Monday 9/13. Patient voiced understanding and agreement.

## 2021-09-13 ENCOUNTER — COMMUNITY OUTREACH (OUTPATIENT)
Dept: OTHER | Age: 61
End: 2021-09-13

## 2021-09-13 NOTE — PROGRESS NOTES
[facility]-SW spoke with patient on this date to follow up on previous conversation. SW and patient discussed income and household circumstances. SW reviewed plans available. Patient then notified SW of other income which effected [employer] plans available to patient. SW to look into further and follow up with patient to discuss options. Patient voiced understanding.

## 2021-09-14 ENCOUNTER — COMMUNITY OUTREACH (OUTPATIENT)
Dept: OTHER | Age: 61
End: 2021-09-14

## 2021-09-14 NOTE — PROGRESS NOTES
NOEMI-NETTA spoke with patient on this date to follow up on previous conversation. Patient reports deciding to enroll in [employer] plan even with the change in deductibles/cost. SW provided phone number to apply and enroll by phone. Patient to call SW after with any questions or concerns.

## 2021-09-27 ENCOUNTER — TELEPHONE (OUTPATIENT)
Dept: FAMILY MEDICINE CLINIC | Age: 61
End: 2021-09-27

## 2021-09-27 NOTE — LETTER
[address] [facility]  Phone: [phone]  Fax: [phone]    Jocelyn Cifuentes MD         September 28, 2021     Patient: Maile Falk   YOB: 1960   Date of Visit: 9/27/2021       To Whom It May Concern: It is my medical opinion that Maile Falk requires a disability parking placard for the following reasons:  He has limited walking ability due to an orthopedic condition. Duration of need: 5 years    If you have any questions or concerns, please don't hesitate to call.     Sincerely,         Jocelyn Cifuentes MD

## 2021-10-12 DIAGNOSIS — I10 ESSENTIAL HYPERTENSION: ICD-10-CM

## 2021-10-13 RX ORDER — METOPROLOL SUCCINATE 50 MG/1
50 TABLET, EXTENDED RELEASE ORAL DAILY
Qty: 90 TABLET | Refills: 0 | Status: SHIPPED | OUTPATIENT
Start: 2021-10-13 | End: 2022-01-10 | Stop reason: SDUPTHER

## 2021-10-27 DIAGNOSIS — E11.9 CONTROLLED TYPE 2 DIABETES MELLITUS WITHOUT COMPLICATION, WITHOUT LONG-TERM CURRENT USE OF INSULIN (HCC): ICD-10-CM

## 2021-11-02 ENCOUNTER — OFFICE VISIT (OUTPATIENT)
Dept: FAMILY MEDICINE CLINIC | Age: 61
End: 2021-11-02
Payer: COMMERCIAL

## 2021-11-02 VITALS
SYSTOLIC BLOOD PRESSURE: 126 MMHG | OXYGEN SATURATION: 97 % | WEIGHT: 315 LBS | HEART RATE: 68 BPM | DIASTOLIC BLOOD PRESSURE: 70 MMHG | RESPIRATION RATE: 18 BRPM | HEIGHT: 70 IN | BODY MASS INDEX: 45.1 KG/M2

## 2021-11-02 DIAGNOSIS — Z12.11 COLON CANCER SCREENING: ICD-10-CM

## 2021-11-02 DIAGNOSIS — E78.5 HYPERLIPIDEMIA WITH TARGET LDL LESS THAN 100: ICD-10-CM

## 2021-11-02 DIAGNOSIS — E11.9 CONTROLLED TYPE 2 DIABETES MELLITUS WITHOUT COMPLICATION, WITHOUT LONG-TERM CURRENT USE OF INSULIN (HCC): Primary | ICD-10-CM

## 2021-11-02 DIAGNOSIS — I10 ESSENTIAL HYPERTENSION: ICD-10-CM

## 2021-11-02 DIAGNOSIS — E03.9 ACQUIRED HYPOTHYROIDISM: ICD-10-CM

## 2021-11-02 DIAGNOSIS — E11.9 CONTROLLED TYPE 2 DIABETES MELLITUS WITHOUT COMPLICATION, WITHOUT LONG-TERM CURRENT USE OF INSULIN (HCC): ICD-10-CM

## 2021-11-02 LAB
A/G RATIO: 1.5 (ref 1.1–2.2)
ALBUMIN SERPL-MCNC: 4.2 G/DL (ref 3.4–5)
ALP BLD-CCNC: 63 U/L (ref 40–129)
ALT SERPL-CCNC: 23 U/L (ref 10–40)
ANION GAP SERPL CALCULATED.3IONS-SCNC: 14 MMOL/L (ref 3–16)
AST SERPL-CCNC: 19 U/L (ref 15–37)
BILIRUB SERPL-MCNC: 0.6 MG/DL (ref 0–1)
BUN BLDV-MCNC: 18 MG/DL (ref 7–20)
CALCIUM SERPL-MCNC: 9.9 MG/DL (ref 8.3–10.6)
CHLORIDE BLD-SCNC: 102 MMOL/L (ref 99–110)
CO2: 22 MMOL/L (ref 21–32)
CREAT SERPL-MCNC: 0.8 MG/DL (ref 0.8–1.3)
GFR AFRICAN AMERICAN: >60
GFR NON-AFRICAN AMERICAN: >60
GLUCOSE BLD-MCNC: 150 MG/DL (ref 70–99)
HBA1C MFR BLD: 6.4 %
POTASSIUM SERPL-SCNC: 4.7 MMOL/L (ref 3.5–5.1)
SODIUM BLD-SCNC: 138 MMOL/L (ref 136–145)
T4 FREE: 1.1 NG/DL (ref 0.9–1.8)
TOTAL PROTEIN: 7 G/DL (ref 6.4–8.2)
TSH REFLEX: 5.08 UIU/ML (ref 0.27–4.2)

## 2021-11-02 PROCEDURE — G8427 DOCREV CUR MEDS BY ELIG CLIN: HCPCS | Performed by: NURSE PRACTITIONER

## 2021-11-02 PROCEDURE — 3017F COLORECTAL CA SCREEN DOC REV: CPT | Performed by: NURSE PRACTITIONER

## 2021-11-02 PROCEDURE — 3044F HG A1C LEVEL LT 7.0%: CPT | Performed by: NURSE PRACTITIONER

## 2021-11-02 PROCEDURE — 83036 HEMOGLOBIN GLYCOSYLATED A1C: CPT | Performed by: NURSE PRACTITIONER

## 2021-11-02 PROCEDURE — G8483 FLU IMM NO ADMIN DOC REA: HCPCS | Performed by: NURSE PRACTITIONER

## 2021-11-02 PROCEDURE — 1036F TOBACCO NON-USER: CPT | Performed by: NURSE PRACTITIONER

## 2021-11-02 PROCEDURE — G8417 CALC BMI ABV UP PARAM F/U: HCPCS | Performed by: NURSE PRACTITIONER

## 2021-11-02 PROCEDURE — 2022F DILAT RTA XM EVC RTNOPTHY: CPT | Performed by: NURSE PRACTITIONER

## 2021-11-02 PROCEDURE — 99214 OFFICE O/P EST MOD 30 MIN: CPT | Performed by: NURSE PRACTITIONER

## 2021-11-02 RX ORDER — GABAPENTIN 600 MG/1
600 TABLET ORAL 3 TIMES DAILY
COMMUNITY

## 2021-11-02 ASSESSMENT — ENCOUNTER SYMPTOMS
NAUSEA: 0
DIARRHEA: 0
COUGH: 0
ABDOMINAL PAIN: 0
BLOOD IN STOOL: 0
BACK PAIN: 1
SHORTNESS OF BREATH: 0
VOMITING: 0
CONSTIPATION: 0

## 2021-11-02 NOTE — PROGRESS NOTES
11/2/2021    This is a 59 y.o. male   Chief Complaint   Patient presents with    Follow-up     Chronic condtions follow up. Justina Bloodgood HPI  Diabetes Mellitus Type 2:   Home blood sugar records: patient does not test  Any episodes of hypoglycemia? no  Eye exam current (within one year): no  Tobacco history: He  reports that he has never smoked. He has never used smokeless tobacco.     Hypertension:  Home blood pressure monitoring: No.  He is not adherent to a low sodium diet. Patient denies chest pain, shortness of breath, lightheadedness, peripheral edema, palpitations and dry cough. Antihypertensive medication side effects: no medication side effects noted. Hyperlipidemia:  No new myalgias or GI upset on pravastatin (Pravachol). Lab Results   Component Value Date    LABA1C 6.4 06/14/2021    LABA1C 6.5 (H) 03/16/2021    LABA1C 6.9 08/20/2020     Lab Results   Component Value Date    LABMICR 2.10 (H) 06/14/2021    CREATININE 0.9 03/17/2021     Lab Results   Component Value Date    ALT 33 03/16/2021    AST 28 03/16/2021     Lab Results   Component Value Date    CHOL 144 03/16/2021    TRIG 141 03/16/2021    HDL 43 03/16/2021    LDLCALC 73 03/16/2021        Continue with low back pain radiating to both legs. Following with Dr. Monica Henriquez and has received epidural injections without much improvement in pain. On gabapentin 600 mg TID with minimal improvement in pain. Hypothyroidism: Started levothyroxine after last visit. Has not noticed much of a difference in energy level.      No results found for: [facility]  Lab Results   Component Value Date    TSH 5.66 (H) 06/14/2021    TSH 10.180 (H) 03/16/2021          Patient Active Problem List   Diagnosis    Plantar fasciitis    Lumbosacral radiculopathy-L5 by EMG, Epidural spring 2011    Spinal stenosis    Essential hypertension    Metabolic syndrome X    Hyperlipidemia with target LDL less than 100    Seborrheic dermatitis    Mood swings    Insomnia    cough and shortness of breath. Cardiovascular: Negative for chest pain. Gastrointestinal: Negative for abdominal pain, blood in stool, constipation, diarrhea, nausea and vomiting. Musculoskeletal: Positive for arthralgias, back pain and myalgias. Neurological: Negative for dizziness and headaches. Vitals:    11/02/21 0916   BP: 126/70   Site: Right Upper Arm   Position: Sitting   Cuff Size: Large Adult   Pulse: 68   Resp: 18   SpO2: 97%   Weight: (!) 338 lb (153.3 kg)   Height: 5' 10\" (1.778 m)       Body mass index is 48.5 kg/m². Wt Readings from Last 3 Encounters:   11/02/21 (!) 338 lb (153.3 kg)   06/14/21 (!) 339 lb (153.8 kg)   04/12/21 (!) 347 lb (157.4 kg)       BP Readings from Last 3 Encounters:   11/02/21 126/70   06/14/21 132/84   04/12/21 (!) 144/94       Physical Exam  Vitals and nursing note reviewed. Constitutional:       General: He is not in acute distress. Appearance: He is well-developed. He is obese. HENT:      Head: Normocephalic and atraumatic. Eyes:      Extraocular Movements: Extraocular movements intact. Conjunctiva/sclera: Conjunctivae normal.   Cardiovascular:      Rate and Rhythm: Normal rate and regular rhythm. Heart sounds: Normal heart sounds. No murmur heard. No friction rub. No gallop. Pulmonary:      Effort: Pulmonary effort is normal. No respiratory distress. Breath sounds: Normal breath sounds. Musculoskeletal:      Cervical back: Neck supple. Comments: Trace arun ankle edema    Skin:     General: Skin is warm and dry. Neurological:      Mental Status: He is alert and oriented to person, place, and time. Psychiatric:         Behavior: Behavior normal.         Thought Content: Thought content normal.         Judgment: Judgment normal.         Assessmentand Plan  Laure was seen today for follow-up.     Diagnoses and all orders for this visit:    Controlled type 2 diabetes mellitus without complication, without long-term current use of insulin (HCC)  -     POCT glycosylated hemoglobin (Hb A1C)- 6.4%  -     Comprehensive Metabolic Panel; Future  On metformin 2000 mg daily  Low carb diet, aerobic exercise, and weight loss discussed and needed. Essential hypertension  -     Comprehensive Metabolic Panel; Future  Controlled current medications    Hyperlipidemia with target LDL less than 100  -     Comprehensive Metabolic Panel; Future  Lipids controlled on pravastatin     Acquired hypothyroidism  -     TSH with Reflex; Future  On levothyroxine 25 mcg daily     Colon cancer screening  -     POCT Fecal Immunochemical Test (FIT); Future      Advised to f/u with his spine specialist Dr. Monica Henriquez for his chronic lumbar radiculopathy pain. Return in about 3 months (around 2/2/2022), or if symptoms worsen or fail to improve, for chronic conditions, with Dr. Phyllis Rossi.

## 2021-11-03 DIAGNOSIS — E03.9 ACQUIRED HYPOTHYROIDISM: ICD-10-CM

## 2021-11-03 RX ORDER — LEVOTHYROXINE SODIUM 0.05 MG/1
50 TABLET ORAL DAILY
Qty: 30 TABLET | Refills: 1 | Status: SHIPPED | OUTPATIENT
Start: 2021-11-03 | End: 2021-12-17 | Stop reason: SDUPTHER

## 2021-11-17 DIAGNOSIS — I10 ESSENTIAL HYPERTENSION: ICD-10-CM

## 2021-11-18 RX ORDER — AMLODIPINE BESYLATE 5 MG/1
5 TABLET ORAL DAILY
Qty: 90 TABLET | Refills: 1 | Status: SHIPPED | OUTPATIENT
Start: 2021-11-18 | End: 2022-06-27

## 2021-11-23 DIAGNOSIS — E78.5 HYPERLIPIDEMIA WITH TARGET LDL LESS THAN 100: ICD-10-CM

## 2021-11-23 RX ORDER — AMITRIPTYLINE HYDROCHLORIDE 50 MG/1
50 TABLET, FILM COATED ORAL NIGHTLY
Qty: 90 TABLET | Refills: 1 | Status: SHIPPED | OUTPATIENT
Start: 2021-11-23 | End: 2022-06-21

## 2021-11-23 RX ORDER — PRAVASTATIN SODIUM 20 MG
20 TABLET ORAL NIGHTLY
Qty: 90 TABLET | Refills: 1 | Status: SHIPPED | OUTPATIENT
Start: 2021-11-23 | End: 2022-06-08

## 2021-12-15 DIAGNOSIS — I10 ESSENTIAL HYPERTENSION: ICD-10-CM

## 2021-12-16 RX ORDER — BENAZEPRIL HYDROCHLORIDE 40 MG/1
TABLET, FILM COATED ORAL
Qty: 90 TABLET | Refills: 1 | Status: SHIPPED | OUTPATIENT
Start: 2021-12-16 | End: 2022-06-16

## 2021-12-17 ENCOUNTER — TELEPHONE (OUTPATIENT)
Dept: FAMILY MEDICINE CLINIC | Age: 61
End: 2021-12-17

## 2021-12-17 DIAGNOSIS — E03.9 ACQUIRED HYPOTHYROIDISM: ICD-10-CM

## 2021-12-17 DIAGNOSIS — G89.29 CHRONIC BILATERAL LOW BACK PAIN WITH LEFT-SIDED SCIATICA: ICD-10-CM

## 2021-12-17 DIAGNOSIS — M54.17 LUMBOSACRAL RADICULOPATHY: Primary | ICD-10-CM

## 2021-12-17 DIAGNOSIS — M54.42 CHRONIC BILATERAL LOW BACK PAIN WITH LEFT-SIDED SCIATICA: ICD-10-CM

## 2021-12-17 DIAGNOSIS — M25.50 POLYARTHRALGIA: ICD-10-CM

## 2021-12-17 DIAGNOSIS — Z23 NEED FOR ZOSTER VACCINATION: ICD-10-CM

## 2021-12-17 RX ORDER — LEVOTHYROXINE SODIUM 0.05 MG/1
50 TABLET ORAL DAILY
Qty: 30 TABLET | Refills: 1 | Status: SHIPPED | OUTPATIENT
Start: 2021-12-17 | End: 2022-02-09 | Stop reason: SDUPTHER

## 2021-12-17 RX ORDER — ETODOLAC 400 MG/1
TABLET, FILM COATED ORAL
Qty: 180 TABLET | Refills: 1 | Status: SHIPPED | OUTPATIENT
Start: 2021-12-17 | End: 2022-05-04

## 2021-12-17 NOTE — TELEPHONE ENCOUNTER
Pt calling stating that [email or web address] no longer takes his insurance and would like a new referral to a new pain management doctor. Stated that insurance said that [facility] can be reached at [phone] will be covered by insurance. Stated that they would need a referral sent to them before scheduling. Pt stated that he needs his second shingles shot and he went to [facility] in [place] [address] to get it but would need something from PCP office stating that he received the first shot and it is okay to give him the second shingles shot. Stated that he was advised by Dr. Kaylin Vizcarra to look into the covid shot. Stated that the last flu shot he got he had a bad reaction to it. Stated that he would like to discuss covid shot and is confused. Stated he would like to know if he would likely have a reaction to the covid shot.     [facility] in [address] [place] can be reached at [phone]

## 2021-12-17 NOTE — TELEPHONE ENCOUNTER
1- referral in EMR to fax  2- [facility] 66 for shingrix vaccine  3- other meds refilled  4- Precautions to a specific COVID-19 vaccine consist of allergic reactions to other vaccines. Patients with such reactions can generally receive a COVID-19 vaccine but warrant longer post-vaccination monitoring than usual (30 min instead of 15 min). May be best to get thru a hospital rather than in a pharmacy setting.

## 2022-01-10 DIAGNOSIS — I10 ESSENTIAL HYPERTENSION: ICD-10-CM

## 2022-01-10 RX ORDER — METOPROLOL SUCCINATE 50 MG/1
50 TABLET, EXTENDED RELEASE ORAL DAILY
Qty: 90 TABLET | Refills: 0 | Status: SHIPPED | OUTPATIENT
Start: 2022-01-10 | End: 2022-04-12

## 2022-01-14 ENCOUNTER — TELEPHONE (OUTPATIENT)
Dept: FAMILY MEDICINE CLINIC | Age: 62
End: 2022-01-14

## 2022-01-14 NOTE — TELEPHONE ENCOUNTER
Stated that he was referred to [facility] (can be reached at [phone]) told him to call PCP and get a referral for PT. Stated that they would like him sent to [facility] PT ph. [phone] and Fax [phone].      Please Advise if referral can be placed  Pt can be reached at [phone]

## 2022-01-27 ENCOUNTER — HOSPITAL ENCOUNTER (OUTPATIENT)
Dept: PHYSICAL THERAPY | Age: 62
Setting detail: THERAPIES SERIES
Discharge: HOME | End: 2022-01-27
Payer: MEDICAID

## 2022-01-27 PROCEDURE — 97162 PT EVAL MOD COMPLEX 30 MIN: CPT

## 2022-01-27 PROCEDURE — 97110 THERAPEUTIC EXERCISES: CPT

## 2022-01-27 PROCEDURE — 97116 GAIT TRAINING THERAPY: CPT

## 2022-01-27 NOTE — PROGRESS NOTES
[facility]  [address]     [place], [facility]  Phone: [phone]       Fax:   [phone]       Physical Therapy Evaluation and Certification    Dear Referring Practitioner: Dr. Karen Whitaker DO,    We had the pleasure of evaluating the following patient for physical therapy services at [address] [facility]. A summary of our findings can be found in the initial assessment below. This includes our plan of care. If you have any questions or concerns regarding these findings, please do not hesitate to contact me at the office phone number above. Thank you for the referral.       Physician/Provider Signature:_______________________________Date:__________________  By signing above (or electronic signature), therapist's plan is approved by physician      Patient: Nubia Wetzel   : 1960   MRN: [id number]    Referring Practitioner: Dr. Karen Whitaker DO        Evaluation Date: 2022        Medical Diagnosis Information:  Diagnosis: disc displacement, lumbar M51.26   Treatment Diagnosis: LBP with radiculopathy, LE weakness, abnormality of gait                                           Insurance information: PT Insurance Information: [employer], no stim, ionto, DN, NEEDS AUTH     Precautions/ Contra-indications:  HTN  Latex Allergy:  [x]NO      []YES     Preferred Language for Healthcare:   [x]English       []other:    C-SSRS Triggered by Intake questionnaire (Past 2 wk assessment):   [] No, Questionnaire did not trigger screening. [x] Yes, Patient intake triggered further evaluation      [x] C-SSRS Screening completed  [] PCP notified via Plan of Care  [] Emergency services notified      SUBJECTIVE FINDINGS      History of Present Illness:      Pt presents with hx of back pain since last March/april, [facility] but insurance \"ran out\" so he had to go on Medicaid. Pt had 7 \"shots\" under care of [facility]. Pt had to switch care to Dr. Shawn Hogue, had one visit, has first injection scheduled 2/17/22 and referred to PT. Pt states he has seen surgeon (Dr Chelle Larose) states he is not a candidate for surgery. Pt uses cane all the time or rollator (can't use too much because its heavy to lift)            FINDINGS:   BONES/ALIGNMENT: There is normal alignment of the spine except anterior   subluxation L5-S1.  The vertebral body heights are maintained. The bone   marrow signal appears unremarkable.       SPINAL CORD: The conus terminates normally.       SOFT TISSUES: No paraspinal mass identified.       L1-2: Moderate circumferential disc marginal osteophyte hypertrophic facet   disease causing moderate central and lateral trefoil type spinal stenosis. Central thecal sac measures 7 mm.  Shortened pedicles noted.       L2-3: Hypertrophic facet disease and mild annular bulge cause moderate   trefoil type spinal stenosis and neural foraminal narrowing.       L3-4: Moderately severe trefoil type spinal stenosis due to hypertrophic   facets and shortened pedicles.       L4-5: Moderate central posterior disc marginal osteophyte and hypertrophic   facet disease causing severe narrowing of the it has thecal sac and neural   foramina.       L5-S1: 7 mm unroofing posterior disc with severe narrowing of the neural   foramina bilaterally.  Central thecal sac patent.           Impression   Grade 1 spondylolisthesis L5-S1.  Multilevel acquired and congenital spinal   stenosis as above. Pain   Constant pain across LS area, L buttock is worst, intermittent radiating pain B LEs L > R, intermittent paresthesias in B LEs.      Patient reports pain is  7/10 pain at present and  10/10 pain at its worst.  Pain increases with : standing and walking, taking a shower      Decreases with: lying down on back or R side, muscle relaxers, anti-inflammatory   Pt. reports pain with coughing, sneezing and laughing:    []Yes   [x]No   []NA   Pt. reports bowel (S1,2) 1+ 2+    Ankle clonus , # of beats 0 0    Babinski's reflex  NT NT        Dermatomal Sensation   [x]All dermatomes WFL for light touch except as marked below  Abnormal Dermatome Findings Left Right   Anterior groin, 2-3 inches below ASIS (L1-L2) Decreased     Middle third anterior thigh (L3) All dermatomes    Patella and med malleolus (L4)     Fibular head and dorsum of foot (L5)     Lateral side and plantar surface of foot (S1)     Medial aspect of posterior thigh (S2)                   Range of Motion/Strength Testing-Myotomes    [x] Blank box below indicates item is NOT TESTED        Range Tested MMT/ Resisted PROM AROM Comments   *denotes pain Left Right Left Right Left Right    Hip Flexion  (L1-2) 5 5        Hip Extension          Hip Abduction  (L5)          Hip Adduction  (L3)          Hip IR          Hip ER          Knee Flexion  (L5,S1) 3+ 4        Knee Extension  (L3,4) 4- 4+        Ankle Dorsiflex  (L4) 4 4+        Ankle Plantarflex  (S1,2) 2 2        Ankle Inversion          Ankle Eversion          Great Toe Ext  (L5) 4+ 4-          [x] Not Tested  Trunk Strength     Trunk Extensors     Gluteals     Abdominals         Flexibility  Not tested due to pain  [] All tested [facility]    [] Deficits indicated as follows:    Muscle Abnormal Findings   Hip flexors/Sp  []Decreased R   []Decreased L      Hamstrings  Degrees in 90/90 []Decreased R   []Decreased L     Right:              Left:      Gastrocs   []Decreased R   []Decreased L      Obers/TFL/ITB   []Decreased R   []Decreased L      Piriformis    []Decreased R   []Decreased L      Other:    []Decreased R   []Decreased L          Palpation     Patient reported tenderness with palpation:  []Yes :   [x]No       [x] Patient history, allergies, meds reviewed. Medical chart reviewed. See intake form. Review Of Systems (ROS):  [x]Performed Review of systems (Integumentary, CardioPulmonary, Neurological) by intake and observation.  Intake form has been scanned into medical record. Patient has been instructed to contact their primary care physician regarding ROS issues if not already being addressed at this time. Co-morbidities/Complexities (which will affect course of rehabilitation):  []None           Arthritic conditions   []Rheumatoid arthritis (M05.9)  [x]Osteoarthritis (M19.91)   Cardiovascular conditions   [x]Hypertension (I10)  []Hyperlipidemia (E78.5)  []Angina pectoris (I20)  []Atherosclerosis (I70)   Musculoskeletal conditions   [x]Disc pathology   []Congenital spine pathologies   []Prior surgical intervention  []Osteoporosis (M81.8)  []Osteopenia (M85.8)   Endocrine conditions   []Hypothyroid (E03.9)  []Hyperthyroid Gastrointestinal conditions   []Constipation (I36.74)   Metabolic conditions   []Morbid obesity (E66.01)  []Diabetes type 1(E10.65) or 2 (E11.65)   []Neuropathy (G60.9)     Pulmonary conditions   []Asthma (J45)  []Coughing   []COPD (J44.9)   Psychological Disorders  []Anxiety (F41.9)  []Depression (F32.9)   []Other:   []Other:            Barriers to/and or personal factors that will affect rehab potential:       []None                  []Age   []Sex     []Smoker              []Motivation/Lack of Motivation                        [x]Co-Morbidities              []Cognitive Function, education/learning barriers              []Environmental, home barriers              []profession/work barriers   []past PT/medical experience   []other:  Justification:     Falls Risk Assessment (30 days): [] NA  [] Falls Risk assessed and no intervention required. [x] Falls Risk assessed and Patient requires intervention due to being higher risk     [] Falls education provided, including: continued use of AD, need for AFO L         ASSESSMENT: Pt is  59 Y. O  fe/male, presenting with diagnosis of Diagnosis: disc displacement, lumbar M51.26 . Assessment reveals deficits in strength, ROM, function as well as increased pain.   Pt unable to tolerate much today for evaluation, writhing in pain while seated in chair, continually rocking back and forth and in circles. Pt will benefit from cont PT to address these deficits and promote return to highest level of functional independence. Functional Impairments:     [x]Noted lumbar/proximal hip hypomobility   []Noted lumbosacral and/or generalized hypermobility   [x]Decreased Lumbosacral/hip/LE functional ROM   [x]Decreased core/proximal hip strength and neuromuscular control    [x]Decreased LE functional strength    [x]Abnormal reflexes/sensation/myotomal/dermatomal deficits  [x]Reduced balance/proprioceptive control    []other:      Functional Activity Limitations (from functional questionnaire and intake)   [x]Reduced ability to tolerate prolonged functional positions   [x]Reduced ability or difficulty with changes of positions or transfers between positions   [x]Reduced ability to maintain good posture and demonstrate good body mechanics with sitting, bending, and lifting   [x]Reduced ability to sleep   [] Reduced ability or tolerance with driving and/or computer work   [x]Reduced ability to perform lifting, reaching, carrying tasks   [x]Reduced ability to squat   [x]Reduced ability to forward bend   [x]Reduced ability to ambulate prolonged functional periods/distances/surfaces   [x]Reduced ability to ascend/descend stairs   []other:       Participation Restrictions   [x]Reduced participation in self care activities   [x]Reduced participation in home management activities   [x]Reduced participation in work activities   [x]Reduced participation in social activities. [x]Reduced participation in sport/recreational activities. Classification:   [x]Signs/symptoms consistent with Lumbar instability/stabilization subgroup. []Signs/symptoms consistent with Lumbar mobilization/manipulation subgroup, myotomes and dermatomes intact. Meets manipulation criteria.     []Signs/symptoms consistent with Lumbar direction specific/centralization subgroup   []Signs/symptoms consistent with Lumbar traction subgroup       []Signs/symptoms consistent with lumbar facet dysfunction   [x]Signs/symptoms consistent with lumbar stenosis type dysfunction   []Signs/symptoms consistent with nerve root involvement including myotome & dermatome dysfunction   []Signs/symptoms consistent with post-surgical status including: decreased ROM, strength and function   []signs/symptoms consistent with pathology which may benefit from Dry needling     []other:      Prognosis/Rehab Potential:      []Excellent   [x]Good    [x]Fair   []Poor    Tolerance of evaluation/treatment:    []Excellent   []Good    []Fair   [x]Poor     Physical Therapy Evaluation Complexity Justification  [x] A history of present problem with:  [] no personal factors and/or comorbidities that impact the plan of care;  [x]1-2 personal factors and/or comorbidities that impact the plan of care  []3 personal factors and/or comorbidities that impact the plan of care  [x] An examination of body systems using standardized tests and measures addressing any of the following: body structures and functions (impairments), activity limitations, and/or participation restrictions;:  [] a total of 1-2 or more elements   [] a total of 3 or more elements   [x] a total of 4 or more elements   [x] A clinical presentation with:  [] stable and/or uncomplicated characteristics   [x] evolving clinical presentation with changing characteristics  [] unstable and unpredictable characteristics;   [x] Clinical decision making of [] low, [x] moderate, [] high complexity using standardized patient assessment instrument and/or measurable assessment of functional outcome.     [] EVAL (LOW) 42140 (typically 20 minutes face-to-face)  [x] EVAL (MOD) 77503 (typically 30 minutes face-to-face)  [] EVAL (HIGH) 40500 (typically 45 minutes face-to-face)  [] RE-EVAL     PLAN:      Plan Moving Forward/ For next visit:     review MRI more closely, spondylolisthesis vs subluxation? ? At L5    progress strength and flexibility exs as able    safe ambulation, need for walker? ?, AFO L foot? ?   Pain modulation         Frequency/Duration:  2 days per week for 6 Weeks:  Interventions:  [x]  Therapeutic exercise including: strength training, ROM, for LE, Glutes and core   [x]  NMR activation and proprioception for glutes , LE and Core   [x]  Manual therapy as indicated for Hip complex, LE and spine to include: Dry Needling/IASTM, STM, PROM, Gr I-IV mobilizations, .   [x]  Modalities as needed that may include: thermal agents, E-stim, Biofeedback, US, iontophoresis as indicated  [x]  Patient education on joint protection, postural re-education, activity modification, progression of HEP. HEP instruction: Pt provided HEP via [employer]     GOALS:  Patient stated goal: \" Patient goals : to try to decrease the pain \"  [] Progressing: [] Met: [] Not Met: [] Adjusted       Therapist goals for Patient:   Short Term Goals: To be achieved in: 3 weeks  1. Independent in HEP and progression per patient tolerance, in order to prevent re-injury. [] Progressing: [] Met: [] Not Met: [] Adjusted  2. Patient will have a decrease in pain to facilitate improvement in movement, function, and ADLs as indicated by Functional Deficits. [] Progressing: [] Met: [] Not Met: [] Adjusted      Long Term Goals: To be achieved in: 6 weeks  1. Disability index score of 16/50 or less for the CHANTE to assist with reaching prior level of function. [] Progressing: [] Met: [] Not Met: [] Adjusted  2. Patient will demonstrate increased [facility] to [facility], good LS mobility, good hip ROM to allow for proper joint functioning as indicated by patients Functional Deficits. [] Progressing: [] Met: [] Not Met: [] Adjusted  3.  Patient will demonstrate an increase in Strength to good proximal hip and core activation to allow for proper functional mobility as indicated by patients Functional Deficits. [] Progressing: [] Met: [] Not Met: [] Adjusted  4. Patient will be able to ambulate safely with LRAD mod indep for 15 minutes. [] Progressing: [] Met: [] Not Met: [] Adjusted  5. Pt will be able to stand for ADLS for 15 minutes    [] Progressing: [] Met: [] Not Met: [] Adjusted   6.  Pt will be able to so 4 steps with step to pattern with 1 rail     [] Progressing: [] Met: [] Not Met: [] Adjusted     Electronically signed by:  Aguilar Mckoy, PT , OMT-C, [id number]

## 2022-01-28 NOTE — FLOWSHEET NOTE
Physical Therapy Daily Treatment Note    [x]Daily Treatment Note    []Progress Note    [] Discharge Summary         Date:  2022    Patient Name:  Barrera Trujillo    :  1960  MRN: [id number]      Medical/Treatment Diagnosis Information:  · Diagnosis: disc displacement, lumbar M51.26  · Treatment Diagnosis: LBP with radiculopathy, LE weakness, abnormality of gait    Insurance/Certification information:  [address], no stim, ionto, DN, NEEDS AUTH    Physician Information:   Dr. Blane Reynolds, DO    Plan of care signed :  []  Yes  [x] No  []  Cosign [x]  Fax    Date of Patient follow up with Physician: 22 for epidural     Is this a Progress Report:     []  Yes  [x]  No      If Yes:  Date Range for reporting period:  Beginning 22  Ending    Progress report will be due (10 Rx or 30 days whichever is less): by 04       Recertification will be due (POC Duration  / 90 days whichever is less): by 22 or 12th visit        Visit # Insurance Allowable Auth Required     Needs auth [x]  Yes []  No        Functional Scale:       Measure used: Alf Cross  Date:  22  Score:  33/50    Latex Allergy:  [x]NO      []YES  Preferred Language for Healthcare:   [x]English       []other:    RESTRICTIONS/PRECAUTIONS:  HTN, fall risk     SUBJECTIVE:  See eval    Pain level: At eval:    Constant pain across LS area, L buttock is worst, intermittent radiating pain B LEs L > R, intermittent paresthesias in B LEs. Patient reports pain is  7/10 pain at present and  10/10 pain at its worst.    Plan Moving Forward/ For next visit:   ·  review MRI more closely, spondylolisthesis vs subluxation? ? At L5  ·  progress strength and flexibility exs as able  ·  safe ambulation, need for walker? ?, AFO L foot? ?  · Pain modulation               OBJECTIVE: See eval        Exercises/Interventions:     Exercises in bold performed in department today.   Items not bolded are carried forward from prior visits for continuity of the record. Exercise/Equipment Resistance/Repetitions HEP Other comments       []      Knee rocks 1x10 [x]      GS 5 sec 1x10 [x]      TA (no exhale) 5 sec 1x10 [x]        []        []        []        []        []          []         []        []        []        []        []        []        []        []      Therapeutic Exercise/Home Exercise Program:   15 minutes  Pt inst in role of PT, prognosis, plan of care, use of CP/HP, activity modification, and benefits of therapy. HEP has been established, See above, pt given handout(s) of new exercises. Therapeutic Activity:  0 minutes     Gait: 10 minutes  Pt educated on foot drop, fall risk, need for continued use of AD, probable AFO L    Neuromuscular Re-Education:  0 minutes      Canalith Repositioning Procedure:  0 minutes     Manual Therapy:  0 minutes    Modalities: 0 minutes          ASSESSMENT:  See eval     GOALS:  Patient stated goal: \" Patient goals : to try to decrease the pain \"  []? Progressing: []? Met: []? Not Met: []? Adjusted         Therapist goals for Patient:   Short Term Goals: To be achieved in: 3 weeks  1. Independent in HEP and progression per patient tolerance, in order to prevent re-injury. []? Progressing: []? Met: []? Not Met: []? Adjusted  2. Patient will have a decrease in pain to facilitate improvement in movement, function, and ADLs as indicated by Functional Deficits. []? Progressing: []? Met: []? Not Met: []? Adjusted        Long Term Goals: To be achieved in: 6 weeks  1. Disability index score of 16/50 or less for the CHANTE to assist with reaching prior level of function. []? Progressing: []? Met: []? Not Met: []? Adjusted  2. Patient will demonstrate increased AROM to [facility], good LS mobility, good hip ROM to allow for proper joint functioning as indicated by patients Functional Deficits. []? Progressing: []? Met: []? Not Met: []? Adjusted  3.  Patient will demonstrate an increase in Strength to good proximal hip and core activation to allow for proper functional mobility as indicated by patients Functional Deficits. []? Progressing: []? Met: []? Not Met: []? Adjusted  4. Patient will be able to ambulate safely with LRAD mod indep for 15 minutes. []? Progressing: []? Met: []? Not Met: []? Adjusted  5. Pt will be able to stand for ADLS for 15 minutes    []? Progressing: []? Met: []? Not Met: []? Adjusted       6. Pt will be able to so 4 steps with step to pattern with 1 rail     []? Progressing: []? Met: []? Not Met: []? Adjusted              Overall Progression Towards Functional goals/ Treatment Progress Update:  [] Patient is progressing as expected towards functional goals listed. [] Progression is slowed due to complexities/Impairments listed. [] Progression has been slowed due to co-morbidities.   [x] Plan just implemented, too soon to assess goals progression <30days   [] Goals require adjustment due to lack of progress  [] Patient is not progressing as expected and requires additional follow up with physician  [] Patient has met goals as marked above   [] Other    Prognosis for POC: [x] Good [x] Fair  [] Poor    Patient requires continued skilled intervention: [x] Yes  [] No    Treatment/Activity Tolerance:  [x] Patient able to complete treatment  [] Patient limited by fatigue  [] Patient limited by pain    [] Patient limited by other medical complications  [] Other:         PLAN: See eval  [] Continue per plan of care [] Alter current plan (see comments above)  [x] Plan of care initiated [] Hold pending MD visit [] Discharge        Therapeutic Exercise and NMR EXR  [x] (88033) Provided verbal/tactile cueing for activities related to strengthening, flexibility, endurance, ROM  for improvements in proximal strength and core control with self care, mobility, lifting and ambulation.  [] (22147) Provided verbal/tactile cueing for activities related to improving balance, coordination, kinesthetic sense, posture, motor skill, proprioception  to assist with core control in self care, mobility, lifting, and ambulation. Therapeutic Activities and Gait:    [] (48271 or 70788) Provided verbal/tactile cueing for activities related to improving balance, coordination, kinesthetic sense, posture, motor skill, proprioception and motor activation to allow for proper function  with self care and ADLs  [x] (31805) Provided training and instruction to the patient for proper core and proximal hip recruitment and positioning with ambulation re-education     Home Exercise Program:    [x] (17541) Reviewed/Progressed HEP activities related to strengthening, flexibility, endurance, ROM of core, proximal hip and LE for functional self-care, mobility, lifting and ambulation   [] (20609) Reviewed/Progressed HEP activities related to improving balance, coordination, kinesthetic sense, posture, motor skill, proprioception of core, proximal hip and LE for self care, mobility, lifting, and ambulation      Manual Treatments:  PROM / STM / Oscillations-Mobs:  G-I, II, III, IV (PA's, Inf., Post.)  [] (11576) Provided manual therapy to mobilize proximal hip and LS spine soft tissue/joints for the purpose of modulating pain, promoting relaxation,  increasing ROM, reducing/eliminating soft tissue swelling/inflammation/restriction, improving soft tissue extensibility and allowing for proper ROM for normal function with self care, mobility, lifting and ambulation.      CRP:  [] (84984) Canalith Repositioning procedure for the assessment, treatment and education of BPPV    Modalities:   [] Ultrasound   [] Estim    [] Mechanical traction    [] Vaso-pneumatic device   [] Ionto     Charges:  Timed Code Treatment Minutes: 25   Total Treatment Minutes: 50     Medicare Cap total YTD:        [x]N/A  Workers Comp Time Stamp  (Per CPT and Total Treatment) [x]N/A   Time In:   Time Out:       [x] EVAL    [] Dry Needling  [x] QX(48740)   x   1  [] EStim Unattended 57372  [] NMR (52850)  x     [] Estim Attended  17121  [] Manual (86086)  x      [] Mechanical Txn 48427  [] TA    x     [] Ultrasound  [x] Gait   X  1  [] Vaso  [] CRP    [] Ionto           [] Other:        Electronically signed by: Kiley Kumar, PT , OMT-C, [id number]        Note: If patient does not return for scheduled/ recommended follow up visits, this note will serve as a discharge from care along with most recent update on progress.

## 2022-02-02 ENCOUNTER — OFFICE VISIT (OUTPATIENT)
Dept: FAMILY MEDICINE CLINIC | Age: 62
End: 2022-02-02
Payer: MEDICAID

## 2022-02-02 ENCOUNTER — HOSPITAL ENCOUNTER (OUTPATIENT)
Dept: PHYSICAL THERAPY | Age: 62
Setting detail: THERAPIES SERIES
Discharge: HOME | End: 2022-02-02
Payer: MEDICAID

## 2022-02-02 VITALS
HEIGHT: 70 IN | DIASTOLIC BLOOD PRESSURE: 78 MMHG | WEIGHT: 315 LBS | OXYGEN SATURATION: 97 % | BODY MASS INDEX: 45.1 KG/M2 | SYSTOLIC BLOOD PRESSURE: 112 MMHG | HEART RATE: 77 BPM

## 2022-02-02 DIAGNOSIS — Z12.11 SCREENING FOR COLON CANCER: ICD-10-CM

## 2022-02-02 DIAGNOSIS — I10 ESSENTIAL HYPERTENSION: ICD-10-CM

## 2022-02-02 DIAGNOSIS — E03.9 ACQUIRED HYPOTHYROIDISM: ICD-10-CM

## 2022-02-02 DIAGNOSIS — R29.90 NEUROLOGICAL SYMPTOMS: ICD-10-CM

## 2022-02-02 DIAGNOSIS — E11.9 CONTROLLED TYPE 2 DIABETES MELLITUS WITHOUT COMPLICATION, WITHOUT LONG-TERM CURRENT USE OF INSULIN (HCC): Primary | ICD-10-CM

## 2022-02-02 DIAGNOSIS — E78.5 HYPERLIPIDEMIA WITH TARGET LDL LESS THAN 100: ICD-10-CM

## 2022-02-02 DIAGNOSIS — E11.9 CONTROLLED TYPE 2 DIABETES MELLITUS WITHOUT COMPLICATION, WITHOUT LONG-TERM CURRENT USE OF INSULIN (HCC): ICD-10-CM

## 2022-02-02 LAB
ATYPICAL LYMPHOCYTE RELATIVE PERCENT: 1 % (ref 0–6)
BANDED NEUTROPHILS RELATIVE PERCENT: 1 % (ref 0–7)
BASOPHILS ABSOLUTE: 0.1 K/UL (ref 0–0.2)
BASOPHILS RELATIVE PERCENT: 1 %
EOSINOPHILS ABSOLUTE: 0.5 K/UL (ref 0–0.6)
EOSINOPHILS RELATIVE PERCENT: 6 %
HBA1C MFR BLD: 6.4 %
HCT VFR BLD CALC: 47.2 % (ref 40.5–52.5)
HEMOGLOBIN: 16.2 G/DL (ref 13.5–17.5)
LYMPHOCYTES ABSOLUTE: 2.1 K/UL (ref 1–5.1)
LYMPHOCYTES RELATIVE PERCENT: 26 %
MCH RBC QN AUTO: 32.9 PG (ref 26–34)
MCHC RBC AUTO-ENTMCNC: 34.2 G/DL (ref 31–36)
MCV RBC AUTO: 96 FL (ref 80–100)
MONOCYTES ABSOLUTE: 0.4 K/UL (ref 0–1.3)
MONOCYTES RELATIVE PERCENT: 5 %
NEUTROPHILS ABSOLUTE: 4.6 K/UL (ref 1.7–7.7)
NEUTROPHILS RELATIVE PERCENT: 60 %
PDW BLD-RTO: 12.7 % (ref 12.4–15.4)
PLATELET # BLD: 275 K/UL (ref 135–450)
PMV BLD AUTO: 8.3 FL (ref 5–10.5)
RBC # BLD: 4.91 M/UL (ref 4.2–5.9)
RBC # BLD: NORMAL 10*6/UL
SLIDE REVIEW: NORMAL
TSH SERPL DL<=0.05 MIU/L-ACNC: 4.47 UIU/ML (ref 0.27–4.2)
WBC # BLD: 7.6 K/UL (ref 4–11)

## 2022-02-02 PROCEDURE — 3046F HEMOGLOBIN A1C LEVEL >9.0%: CPT | Performed by: FAMILY MEDICINE

## 2022-02-02 PROCEDURE — 83036 HEMOGLOBIN GLYCOSYLATED A1C: CPT | Performed by: FAMILY MEDICINE

## 2022-02-02 PROCEDURE — 99214 OFFICE O/P EST MOD 30 MIN: CPT | Performed by: FAMILY MEDICINE

## 2022-02-02 PROCEDURE — G8483 FLU IMM NO ADMIN DOC REA: HCPCS | Performed by: FAMILY MEDICINE

## 2022-02-02 PROCEDURE — 1036F TOBACCO NON-USER: CPT | Performed by: FAMILY MEDICINE

## 2022-02-02 PROCEDURE — G8427 DOCREV CUR MEDS BY ELIG CLIN: HCPCS | Performed by: FAMILY MEDICINE

## 2022-02-02 PROCEDURE — G8417 CALC BMI ABV UP PARAM F/U: HCPCS | Performed by: FAMILY MEDICINE

## 2022-02-02 PROCEDURE — 3017F COLORECTAL CA SCREEN DOC REV: CPT | Performed by: FAMILY MEDICINE

## 2022-02-02 PROCEDURE — 97110 THERAPEUTIC EXERCISES: CPT

## 2022-02-02 PROCEDURE — 97140 MANUAL THERAPY 1/> REGIONS: CPT

## 2022-02-02 PROCEDURE — 2022F DILAT RTA XM EVC RTNOPTHY: CPT | Performed by: FAMILY MEDICINE

## 2022-02-02 NOTE — PROGRESS NOTES
CHRONIC CONDITION FOLOW-UP    Subjective:      Chief Complaint   Patient presents with    Diabetes     3 mo f/u     Josiane Smith is an 59 y.o. male who presents for follow up of following chronic problems:  1. Controlled type 2 diabetes mellitus without complication, without long-term current use of insulin ([address]    2. Hyperlipidemia with target LDL less than 100    3. Essential hypertension    4. Acquired hypothyroidism    5. Screening for colon cancer    6. Neurological symptoms      Complaints: none  · Chronic back pain flares off-on. Planning nerve block and epidural soon  · Sis  yesterday from heart condition  · A1c 6.4 today  · 2 episodes in past month of feeling like entire body vibrating including head, lasted hours overnight. Not visible tremor. CHART REVIEW   reports that he has never smoked.  He has never used smokeless tobacco.  Health Maintenance Due   Topic Date Due    COVID-19 Vaccine (1) Never done    Diabetic retinal exam  Never done    Colon Cancer Screen FIT/FOBT  2018    Shingles Vaccine (2 of 2) 10/11/2018     Current Outpatient Medications   Medication Instructions    amitriptyline (ELAVIL) 50 mg, Oral, NIGHTLY    amLODIPine (NORVASC) 5 mg, Oral, DAILY    aspirin 81 mg, Oral, DAILY    benazepril (LOTENSIN) 40 MG tablet TAKE ONE TABLET BY MOUTH DAILY    etodolac (LODINE) 400 MG tablet TAKE ONE TABLET BY MOUTH TWICE DAILY AS NEEDED    gabapentin (NEURONTIN) 600 mg, Oral, 3 TIMES DAILY    ibuprofen (ADVIL;MOTRIN) 400 mg, Oral, EVERY 8 HOURS PRN    levothyroxine (SYNTHROID) 50 mcg, Oral, DAILY    metFORMIN (GLUCOPHAGE) 2,000 mg, Oral, DAILY WITH BREAKFAST    metoprolol succinate (TOPROL XL) 50 mg, Oral, DAILY    multivitamin (THERAGRAN) per tablet 1 tablet, Oral, DAILY    omeprazole (PRILOSEC) 20 mg, Oral, DAILY BEFORE BREAKFAST    pravastatin (PRAVACHOL) 20 mg, Oral, NIGHTLY     LAST LABS  Lab Results   Component Value Date    LDLCALC 73 2021     Lab Results Component Value Date    HDL 43 03/16/2021     Lab Results   Component Value Date    TRIG 141 03/16/2021     Lab Results   Component Value Date     11/02/2021    K 4.7 11/02/2021    CREATININE 0.8 11/02/2021     Lab Results   Component Value Date    WBC 8.6 03/17/2021    HGB 13.9 03/17/2021     03/17/2021     Lab Results   Component Value Date    ALT 23 11/02/2021    AST 19 11/02/2021    ALKPHOS 63 11/02/2021    BILITOT 0.6 11/02/2021     TSH (uIU/mL)   Date Value   06/14/2021 5.66 (H)     Lab Results   Component Value Date    GLUCOSE 150 (H) 11/02/2021     Lab Results   Component Value Date    LABA1C 6.4 11/02/2021    LABA1C 6.4 06/14/2021    LABA1C 6.5 (H) 03/16/2021     Objective:   PHYSICAL EXAM   /78 (Site: Left Upper Arm, Position: Sitting, Cuff Size: Large Adult)   Pulse 77   Ht 5' 10\" (1.778 m)   Wt (!) 340 lb (154.2 kg)   SpO2 97%   BMI 48.78 kg/m²   BP Readings from Last 5 Encounters:   02/02/22 112/78   11/02/21 126/70   06/14/21 132/84   04/12/21 (!) 144/94   04/07/21 138/80     Wt Readings from Last 5 Encounters:   02/02/22 (!) 340 lb (154.2 kg)   11/02/21 (!) 338 lb (153.3 kg)   06/14/21 (!) 339 lb (153.8 kg)   04/12/21 (!) 347 lb (157.4 kg)   04/07/21 (!) 346 lb (156.9 kg)    GENERAL:   · well-developed, well-nourished, alert, no distress. LUNGS:    · Breathing unlabored  · clear to auscultation bilaterally and good air movement  CARDIOVASC:   · regular rate and rhythm  · LEGS:  Lower extremity edema: none    SKIN: warm and dry     Assessment and Plan:      Diagnosis Orders   1. Controlled type 2 diabetes mellitus without complication, without long-term current use of insulin (HCC)  POCT glycosylated hemoglobin (Hb A1C)    metFORMIN (GLUCOPHAGE) 1000 MG tablet    Hemoglobin A1C   2. Hyperlipidemia with target LDL less than 100  Comprehensive Metabolic Panel    Lipid Panel   3. Essential hypertension  Comprehensive Metabolic Panel   4.  Acquired hypothyroidism  TSH without Reflex   5. Screening for colon cancer  POCT Fecal Immunochemical Test (FIT)   6. Neurological symptoms  CBC Auto Differential   Stable     Continue current Tx plan. Any changes marked below. MA- can we arrange to get COVID vaccine at a medical site (not pharmacy). Will need 30 minute observation. INSTRUCTIONS  NEXT APPOINTMENT: Please schedule check-up in 3 months. · PLEASE TAKE THIS FORM TO CHECK-OUT WINDOW TO SCHEDULE NEXT VISIT. · PLEASE GET BLOODWORK DRAWN TODAY ON FIRST FLOOR in 170. Take orders with you. RESULTS- most blood tests back in couple days. We will call you if any problems. If bloodwork good, you will get letter in mail or notified thru [address] (if signed up) within 2 weeks. If you do not, please call office. · Please do stool dip screen and return/mail back to office for colon cancer screening. Please get annual dilated eye exam to screen for diabetic retinopathy which can lead to vision loss. Ask for report to be faxed to [phone].   If vibrating feeling becomes recurrent

## 2022-02-02 NOTE — PATIENT INSTRUCTIONS
MA- can we arrange to get COVID vaccine at a medical site (not pharmacy). Will need 30 minute observation. INSTRUCTIONS  NEXT APPOINTMENT: Please schedule check-up in 3 months. · PLEASE TAKE THIS FORM TO CHECK-OUT WINDOW TO SCHEDULE NEXT VISIT. · PLEASE GET BLOODWORK DRAWN TODAY ON FIRST FLOOR in 170. Take orders with you. RESULTS- most blood tests back in couple days. We will call you if any problems. If bloodwork good, you will get letter in mail or notified thru [address] (if signed up) within 2 weeks. If you do not, please call office. · Please do stool dip screen and return/mail back to office for colon cancer screening. Please get annual dilated eye exam to screen for diabetic retinopathy which can lead to vision loss. Ask for report to be faxed to [phone].   If vibrating feeling becomes recurrent

## 2022-02-02 NOTE — FLOWSHEET NOTE
Physical Therapy Daily Treatment Note    [x]Daily Treatment Note    []Progress Note    [] Discharge Summary         Date:  2022    Patient Name:  Allyssa Castillo    :  1960  MRN: [id number]      Medical/Treatment Diagnosis Information:  · Diagnosis: disc displacement, lumbar M51.26  · Treatment Diagnosis: LBP with radiculopathy, LE weakness, abnormality of gait    Insurance/Certification information:  [address], no stim, ionto, DN, NEEDS AUTH    Physician Information:   Dr. Kushal Prasad, DO    Plan of care signed :  []  Yes  [x] No  []  Cosign [x]  Fax    Date of Patient follow up with Physician: 22 for epidural     Is this a Progress Report:     []  Yes  [x]  No      If Yes:  Date Range for reporting period:  Beginning 22  Ending    Progress report will be due (10 Rx or 30 days whichever is less): by 36       Recertification will be due (POC Duration  / 90 days whichever is less): by 22 or  visit        Visit # Insurance Allowable Auth Required     Needs auth [x]  Yes []  No        Functional Scale:       Measure used: Hemalatha Garcia  Date:  22  Score:  33/50    Latex Allergy:  [x]NO      []YES  Preferred Language for Healthcare:   [x]English       []other:    RESTRICTIONS/PRECAUTIONS:  HTN, fall risk     SUBJECTIVE:    Pt states he had to come to hospital on Monday due to his sister passing away, lot of walking so increased pain. But not as painful as when he was here for the evaluation   Pt getting nerve block on L side on 22      Pain level:  Currently 4/10 across LB, pain as high as 10/10 in past few days. At eval:    Constant pain across LS area, L buttock is worst, intermittent radiating pain B LEs L > R, intermittent paresthesias in B LEs.      Patient reports pain is  7/10 pain at present and  10/10 pain at its worst.    Plan Moving Forward/ For next visit:   ·  progress strength and flexibility exs as able  ·  safe ambulation, need for walker? ?, AFO L foot? ?  · Pain modulation               OBJECTIVE:   FINDINGS:   BONES/ALIGNMENT: There is normal alignment of the spine except anterior   subluxation L5-S1.  The vertebral body heights are maintained. The bone   marrow signal appears unremarkable.       SPINAL CORD: The conus terminates normally.       SOFT TISSUES: No paraspinal mass identified.       L1-2: Moderate circumferential disc marginal osteophyte hypertrophic facet   disease causing moderate central and lateral trefoil type spinal stenosis. Central thecal sac measures 7 mm.  Shortened pedicles noted.       L2-3: Hypertrophic facet disease and mild annular bulge cause moderate   trefoil type spinal stenosis and neural foraminal narrowing.       L3-4: Moderately severe trefoil type spinal stenosis due to hypertrophic   facets and shortened pedicles.       L4-5: Moderate central posterior disc marginal osteophyte and hypertrophic   facet disease causing severe narrowing of the it has thecal sac and neural   foramina.       L5-S1: 7 mm unroofing posterior disc with severe narrowing of the neural   foramina bilaterally.  Central thecal sac patent.           Impression   Grade 1 spondylolisthesis L5-S1.  Multilevel acquired and congenital spinal   stenosis as above.              Exercises/Interventions:     Exercises in bold performed in department today. Items not bolded are carried forward from prior visits for continuity of the record.   Exercise/Equipment Resistance/Repetitions HEP Other comments       []      Knee rocks 1x10 [x]      GS 5 sec 1x10 [x]      TA (no exhale) 5 sec 1x10 [x]      Knee to chest with towel assist under knee 10 sec x 5 B [x]      Supine 90/90 traction with legs up on chair   10-20 minutes at a time, pt do to 1-3x/day for pain relief  [x]        []        []        []          []         []        []        []        []        []        []        []        []      Therapeutic Exercise/Home Exercise Program:   21 minutes  Reviewed MRI with pt, pt educated on stenosis   HEP has been established, See above, reviewed, progressed, pt given handout(s) of new exercises. Therapeutic Activity:  0 minutes     Gait: 0 minutes    Neuromuscular Re-Education:  0 minutes      Canalith Repositioning Procedure:  0 minutes     Manual Therapy:  15 minutes  Trial manual lumbar traction with belts (2 blue belts) x 15 minutes  Pt notes decreased pain after     Modalities: 0 minutes          ASSESSMENT:  Pain down to 1/10 at end of session     GOALS:  Patient stated goal: \" Patient goals : to try to decrease the pain \"  []? Progressing: []? Met: []? Not Met: []? Adjusted         Therapist goals for Patient:   Short Term Goals: To be achieved in: 3 weeks  1. Independent in HEP and progression per patient tolerance, in order to prevent re-injury. []? Progressing: []? Met: []? Not Met: []? Adjusted  2. Patient will have a decrease in pain to facilitate improvement in movement, function, and ADLs as indicated by Functional Deficits. []? Progressing: []? Met: []? Not Met: []? Adjusted        Long Term Goals: To be achieved in: 6 weeks  1. Disability index score of 16/50 or less for the CHANTE to assist with reaching prior level of function. []? Progressing: []? Met: []? Not Met: []? Adjusted  2. Patient will demonstrate increased AROM to [facility], good LS mobility, good hip ROM to allow for proper joint functioning as indicated by patients Functional Deficits. []? Progressing: []? Met: []? Not Met: []? Adjusted  3. Patient will demonstrate an increase in Strength to good proximal hip and core activation to allow for proper functional mobility as indicated by patients Functional Deficits. []? Progressing: []? Met: []? Not Met: []? Adjusted  4. Patient will be able to ambulate safely with LRAD mod indep for 15 minutes. []? Progressing: []? Met: []? Not Met: []? Adjusted  5. Pt will be able to stand for ADLS for 15 minutes    []?  Progressing: []? Met: []? Not Met: []? Adjusted       6. Pt will be able to so 4 steps with step to pattern with 1 rail     []? Progressing: []? Met: []? Not Met: []? Adjusted              Overall Progression Towards Functional goals/ Treatment Progress Update:  [] Patient is progressing as expected towards functional goals listed. [] Progression is slowed due to complexities/Impairments listed. [] Progression has been slowed due to co-morbidities. [x] Plan just implemented, too soon to assess goals progression <30days   [] Goals require adjustment due to lack of progress  [] Patient is not progressing as expected and requires additional follow up with physician  [] Patient has met goals as marked above   [] Other    Prognosis for POC: [x] Good [x] Fair  [] Poor    Patient requires continued skilled intervention: [x] Yes  [] No    Treatment/Activity Tolerance:  [x] Patient able to complete treatment  [] Patient limited by fatigue  [] Patient limited by pain    [] Patient limited by other medical complications  [] Other:         PLAN:   [x] Continue per plan of care [] Alter current plan (see comments above)  [] Plan of care initiated [] Hold pending MD visit [] Discharge        Therapeutic Exercise and NMR EXR  [x] (81247) Provided verbal/tactile cueing for activities related to strengthening, flexibility, endurance, ROM  for improvements in proximal strength and core control with self care, mobility, lifting and ambulation.  [] (80816) Provided verbal/tactile cueing for activities related to improving balance, coordination, kinesthetic sense, posture, motor skill, proprioception  to assist with core control in self care, mobility, lifting, and ambulation.      Therapeutic Activities and Gait:    [] (72219 or 91680) Provided verbal/tactile cueing for activities related to improving balance, coordination, kinesthetic sense, posture, motor skill, proprioception and motor activation to allow for proper function  with self care and ADLs  [] (64298) Provided training and instruction to the patient for proper core and proximal hip recruitment and positioning with ambulation re-education     Home Exercise Program:    [x] (78415) Reviewed/Progressed HEP activities related to strengthening, flexibility, endurance, ROM of core, proximal hip and LE for functional self-care, mobility, lifting and ambulation   [] (15601) Reviewed/Progressed HEP activities related to improving balance, coordination, kinesthetic sense, posture, motor skill, proprioception of core, proximal hip and LE for self care, mobility, lifting, and ambulation      Manual Treatments:  PROM / STM / Oscillations-Mobs:  G-I, II, III, IV (PA's, Inf., Post.)  [x] (21130) Provided manual therapy to mobilize proximal hip and LS spine soft tissue/joints for the purpose of modulating pain, promoting relaxation,  increasing ROM, reducing/eliminating soft tissue swelling/inflammation/restriction, improving soft tissue extensibility and allowing for proper ROM for normal function with self care, mobility, lifting and ambulation.      CRP:  [] (47776) Canalith Repositioning procedure for the assessment, treatment and education of BPPV    Modalities:   [] Ultrasound   [] Estim    [] Mechanical traction    [] Vaso-pneumatic device   [] Ionto     Charges:  Timed Code Treatment Minutes: 38   Total Treatment Minutes: 38     Medicare Cap total YTD:        [x]N/A  Workers Comp Time Stamp  (Per CPT and Total Treatment) [x]N/A   Time In:   Time Out:       [x] EVAL    [] Dry Needling  [x] MS(33382)   x   2  [] EStim Unattended 91028  [] NMR (83468)  x     [] Estim Attended  73395  [x] Manual (51219)  x    1  [] Mechanical Txn 50401  [] TA    x     [] Ultrasound  [] Gait   X    [] Vaso  [] CRP    [] Ionto           [] Other:        Electronically signed by: Dhara Salcedo PT , OMT-C, [id number]        Note: If patient does not return for scheduled/ recommended follow up visits, this note will serve as a discharge from care along with most recent update on progress.

## 2022-02-03 LAB
A/G RATIO: 1.5 (ref 1.1–2.2)
ALBUMIN SERPL-MCNC: 4.6 G/DL (ref 3.4–5)
ALP BLD-CCNC: 69 U/L (ref 40–129)
ALT SERPL-CCNC: 30 U/L (ref 10–40)
ANION GAP SERPL CALCULATED.3IONS-SCNC: 15 MMOL/L (ref 3–16)
AST SERPL-CCNC: 23 U/L (ref 15–37)
BILIRUB SERPL-MCNC: 0.5 MG/DL (ref 0–1)
BUN BLDV-MCNC: 22 MG/DL (ref 7–20)
CALCIUM SERPL-MCNC: 9.9 MG/DL (ref 8.3–10.6)
CHLORIDE BLD-SCNC: 104 MMOL/L (ref 99–110)
CHOLESTEROL, TOTAL: 149 MG/DL (ref 0–199)
CO2: 20 MMOL/L (ref 21–32)
CREAT SERPL-MCNC: 0.7 MG/DL (ref 0.8–1.3)
ESTIMATED AVERAGE GLUCOSE: 137 MG/DL
GFR AFRICAN AMERICAN: >60
GFR NON-AFRICAN AMERICAN: >60
GLUCOSE BLD-MCNC: 170 MG/DL (ref 70–99)
HBA1C MFR BLD: 6.4 %
HDLC SERPL-MCNC: 43 MG/DL (ref 40–60)
LDL CHOLESTEROL CALCULATED: 67 MG/DL
POTASSIUM SERPL-SCNC: 4.8 MMOL/L (ref 3.5–5.1)
SODIUM BLD-SCNC: 139 MMOL/L (ref 136–145)
TOTAL PROTEIN: 7.6 G/DL (ref 6.4–8.2)
TRIGL SERPL-MCNC: 193 MG/DL (ref 0–150)
VLDLC SERPL CALC-MCNC: 39 MG/DL

## 2022-02-09 ENCOUNTER — HOSPITAL ENCOUNTER (OUTPATIENT)
Dept: PHYSICAL THERAPY | Age: 62
Setting detail: THERAPIES SERIES
Discharge: HOME | End: 2022-02-09
Payer: MEDICAID

## 2022-02-09 DIAGNOSIS — E03.9 ACQUIRED HYPOTHYROIDISM: ICD-10-CM

## 2022-02-09 PROCEDURE — G0283 ELEC STIM OTHER THAN WOUND: HCPCS

## 2022-02-09 PROCEDURE — 97110 THERAPEUTIC EXERCISES: CPT

## 2022-02-09 RX ORDER — LEVOTHYROXINE SODIUM 0.07 MG/1
75 TABLET ORAL DAILY
Qty: 90 TABLET | Refills: 1 | Status: SHIPPED | OUTPATIENT
Start: 2022-02-09 | End: 2022-08-02

## 2022-02-09 NOTE — FLOWSHEET NOTE
Physical Therapy Daily Treatment Note    [x]Daily Treatment Note    []Progress Note    [] Discharge Summary         Date:  2022    Patient Name:  Elizabeth Baker    :  1960  MRN: [id number]      Medical/Treatment Diagnosis Information:  · Diagnosis: disc displacement, lumbar M51.26  · Treatment Diagnosis: LBP with radiculopathy, LE weakness, abnormality of gait    Insurance/Certification information:  [address], no stim, ionto, DN, NEEDS AUTH    Physician Information:   Dr. Kira Alarcon,     Plan of care signed :  []  Yes  [x] No  []  Cosign [x]  Fax    Date of Patient follow up with Physician: 22 for epidural     Is this a Progress Report:     []  Yes  [x]  No      If Yes:  Date Range for reporting period:  Beginning 22  Ending    Progress report will be due (10 Rx or 30 days whichever is less): by        Recertification will be due (POC Duration  / 90 days whichever is less): by 22 or  visit        Visit # Insurance Allowable Auth Required   3/12  Needs auth [x]  Yes []  No        Functional Scale:       Measure used: Erin Andres  Date:  22  Score:  33/50    Latex Allergy:  [x]NO      []YES  Preferred Language for Healthcare:   [x]English       []other:    RESTRICTIONS/PRECAUTIONS:  HTN, fall risk     SUBJECTIVE:    Pt states he fell yesterday at  home for his sister's .  States was just standing there and L leg gave out. Pt getting nerve block on L side on 22  Pt not sure how much he can do today due to significant pain today. States exs do help but as soon as he stands up and starts walking, pain returns. Pt agreeable to trying manual lumbar traction again, helped last visit       Pain level:  Currently 7-8/10 across LB and down L LE to big toe , pain as high as 10/10 in past few days. At eval:    Constant pain across LS area, L buttock is worst, intermittent radiating pain B LEs L > R, intermittent paresthesias in B LEs. Patient reports pain is  7/10 pain at present and  10/10 pain at its worst.    Plan Moving Forward/ For next visit:   ·  progress strength and flexibility exs as able  ·  safe ambulation, need for walker? ?, AFO L foot? ?  · Pain modulation               OBJECTIVE:   FINDINGS:   BONES/ALIGNMENT: There is normal alignment of the spine except anterior   subluxation L5-S1.  The vertebral body heights are maintained. The bone   marrow signal appears unremarkable.       SPINAL CORD: The conus terminates normally.       SOFT TISSUES: No paraspinal mass identified.       L1-2: Moderate circumferential disc marginal osteophyte hypertrophic facet   disease causing moderate central and lateral trefoil type spinal stenosis. Central thecal sac measures 7 mm.  Shortened pedicles noted.       L2-3: Hypertrophic facet disease and mild annular bulge cause moderate   trefoil type spinal stenosis and neural foraminal narrowing.       L3-4: Moderately severe trefoil type spinal stenosis due to hypertrophic   facets and shortened pedicles.       L4-5: Moderate central posterior disc marginal osteophyte and hypertrophic   facet disease causing severe narrowing of the it has thecal sac and neural   foramina.       L5-S1: 7 mm unroofing posterior disc with severe narrowing of the neural   foramina bilaterally.  Central thecal sac patent.           Impression   Grade 1 spondylolisthesis L5-S1.  Multilevel acquired and congenital spinal   stenosis as above.              Exercises/Interventions:     Exercises in bold performed in department today. Items not bolded are carried forward from prior visits for continuity of the record.   Exercise/Equipment Resistance/Repetitions HEP Other comments       []      Knee rocks 1x10 [x]      GS 5 sec 1x10 [x]      TA (no exhale) 5 sec 1x10 [x]      Knee to chest with towel assist under knee 10 sec x 5 B [x]      Supine 90/90 traction with legs up on chair   10-20 minutes at a time, pt do to 1-3x/day for pain relief  [x] Pt states helps temporarily      Positional distraction SL R over roll  Pt inst to do this for 10-30 min at a time, 3x per day or more as needed, and to monitor leg symptoms to see if they improve. [x] Pt inst to also monitor R LE and if any radiating pain develops, pt is to stop ex.        []        []          []         []        []        []        []        []        []        []        []      Therapeutic Exercise/Home Exercise Program:   25 minutes  HEP has been established, See above, reviewed, progressed, pt given handout(s) of new exercises. Reassessed ankle DF strength on L due to pt dragging L foot  Pt has full active DF on L  Pt states he just drags foot because it lessens the pain. Discussed using his walker when pain levels are high and dragging foot  Discussed purchasing TENS unit if pt feels estim helped. Pt given suggestions off internet. Therapeutic Activity:  0 minutes     Gait: 0 minutes    Neuromuscular Re-Education:  0 minutes      Canalith Repositioning Procedure:  0 minutes     Manual Therapy:  5 minutes  Manual lumbar traction with belts (2 blue belts) x 5 minutes  Pt states pain is increasing so PT stopped. Modalities: 15 minutes  Trial estim for pain modulation, premod, 2 electrodes across L5-S1 and 2 electrodes L buttock. Done while pt is in positional distraction SL R over roll  No complaints. ASSESSMENT:  Pain down to 3/10 at end of session     GOALS:  Patient stated goal: \" Patient goals : to try to decrease the pain \"  []? Progressing: []? Met: []? Not Met: []? Adjusted         Therapist goals for Patient:   Short Term Goals: To be achieved in: 3 weeks  1. Independent in HEP and progression per patient tolerance, in order to prevent re-injury. []? Progressing: []? Met: []? Not Met: []? Adjusted  2. Patient will have a decrease in pain to facilitate improvement in movement, function, and ADLs as indicated by Functional Deficits.   []? Progressing: []? Met: []? Not Met: []? Adjusted        Long Term Goals: To be achieved in: 6 weeks  1. Disability index score of 16/50 or less for the CHANTE to assist with reaching prior level of function. []? Progressing: []? Met: []? Not Met: []? Adjusted  2. Patient will demonstrate [facility], good LS mobility, good hip ROM to allow for proper joint functioning as indicated by patients Functional Deficits. []? Progressing: []? Met: []? Not Met: []? Adjusted  3. Patient will demonstrate an increase in Strength to good proximal hip and core activation to allow for proper functional mobility as indicated by patients Functional Deficits. []? Progressing: []? Met: []? Not Met: []? Adjusted  4. Patient will be able to ambulate safely with LRAD mod indep for 15 minutes. []? Progressing: []? Met: []? Not Met: []? Adjusted  5. Pt will be able to stand for ADLS for 15 minutes    []? Progressing: []? Met: []? Not Met: []? Adjusted       6. Pt will be able to so 4 steps with step to pattern with 1 rail     []? Progressing: []? Met: []? Not Met: []? Adjusted              Overall Progression Towards Functional goals/ Treatment Progress Update:  [] Patient is progressing as expected towards functional goals listed. [] Progression is slowed due to complexities/Impairments listed. [] Progression has been slowed due to co-morbidities.   [x] Plan just implemented, too soon to assess goals progression <30days   [] Goals require adjustment due to lack of progress  [] Patient is not progressing as expected and requires additional follow up with physician  [] Patient has met goals as marked above   [] Other    Prognosis for POC: [x] Good [x] Fair  [] Poor    Patient requires continued skilled intervention: [x] Yes  [] No    Treatment/Activity Tolerance:  [x] Patient able to complete treatment  [] Patient limited by fatigue  [x] Patient limited by pain    [] Patient limited by other medical complications  [] Other: PLAN:   [x] Continue per plan of care [] Alter current plan (see comments above)  [] Plan of care initiated [] Hold pending MD visit [] Discharge        Therapeutic Exercise and NMR EXR  [x] (47951) Provided verbal/tactile cueing for activities related to strengthening, flexibility, endurance, ROM  for improvements in proximal strength and core control with self care, mobility, lifting and ambulation.  [] (26588) Provided verbal/tactile cueing for activities related to improving balance, coordination, kinesthetic sense, posture, motor skill, proprioception  to assist with core control in self care, mobility, lifting, and ambulation.      Therapeutic Activities and Gait:    [] (95891 or 37340) Provided verbal/tactile cueing for activities related to improving balance, coordination, kinesthetic sense, posture, motor skill, proprioception and motor activation to allow for proper function  with self care and ADLs  [] (15468) Provided training and instruction to the patient for proper core and proximal hip recruitment and positioning with ambulation re-education     Home Exercise Program:    [x] (26293) Reviewed/Progressed HEP activities related to strengthening, flexibility, endurance, ROM of core, proximal hip and LE for functional self-care, mobility, lifting and ambulation   [] (23093) Reviewed/Progressed HEP activities related to improving balance, coordination, kinesthetic sense, posture, motor skill, proprioception of core, proximal hip and LE for self care, mobility, lifting, and ambulation      Manual Treatments:  PROM / STM / Oscillations-Mobs:  G-I, II, III, IV (PA's, Inf., Post.)  [x] (87509) Provided manual therapy to mobilize proximal hip and LS spine soft tissue/joints for the purpose of modulating pain, promoting relaxation,  increasing ROM, reducing/eliminating soft tissue swelling/inflammation/restriction, improving soft tissue extensibility and allowing for proper ROM for normal function with self care, mobility, lifting and ambulation. CRP:  [] (53720) Canalith Repositioning procedure for the assessment, treatment and education of BPPV    Modalities:   [] Ultrasound   [x] Estim    [] Mechanical traction    [] Vaso-pneumatic device   [] Ionto     Charges:  Timed Code Treatment Minutes: 30   Total Treatment Minutes: 45     Medicare Cap total YTD:        [x]N/A  Workers Comp Time Stamp  (Per CPT and Total Treatment) [x]N/A   Time In:   Time Out:       [] EVAL    [] Dry Needling  [x] XI(10663)   x   2  [x] EStim Unattended 55970  [] NMR (25842)  x     [] Estim Attended  89919  [] Manual (63337)  x     [] Mechanical Txn 99552  [] TA    x     [] Ultrasound  [] Gait   X    [] Vaso  [] CRP    [] Ionto           [] Other:        Electronically signed by: Amadou Garrido PT , OMT-C, [id number]        Note: If patient does not return for scheduled/ recommended follow up visits, this note will serve as a discharge from care along with most recent update on progress.

## 2022-02-16 ENCOUNTER — HOSPITAL ENCOUNTER (OUTPATIENT)
Dept: PHYSICAL THERAPY | Age: 62
Setting detail: THERAPIES SERIES
Discharge: HOME | End: 2022-02-16
Payer: MEDICAID

## 2022-02-16 PROCEDURE — 97110 THERAPEUTIC EXERCISES: CPT

## 2022-02-16 NOTE — FLOWSHEET NOTE
Physical Therapy Daily Treatment Note    [x]Daily Treatment Note    []Progress Note    [] Discharge Summary         Date:  2022    Patient Name:  Barrera Trujillo    :  1960  MRN: [id number]      Medical/Treatment Diagnosis Information:  · Diagnosis: disc displacement, lumbar M51.26  · Treatment Diagnosis: LBP with radiculopathy, LE weakness, abnormality of gait    Insurance/Certification information:  [facility], no stim, ionto, DN, NEEDS AUTH    Physician Information:   Dr. Blane Reynolds, DO    Plan of care signed :  []  Yes  [x] No  []  Cosign [x]  Fax    Date of Patient follow up with Physician: 22 for epidural     Is this a Progress Report:     []  Yes  [x]  No      If Yes:  Date Range for reporting period:  Beginning 22  Ending    Progress report will be due (10 Rx or 30 days whichever is less): by        Recertification will be due (POC Duration  / 90 days whichever is less): by 22 or  visit        Visit # Insurance Allowable Auth Required     Needs auth  APPROVED 48 UNITS OF EX, NEURO, TA, AND MANUAL FROM 22 TO 3/18/22 [x]  Yes []  No        Functional Scale:       Measure used: Alf Cross  Date:  22  Score:  33/50    Latex Allergy:  [x]NO      []YES  Preferred Language for Healthcare:   [x]English       []other:    RESTRICTIONS/PRECAUTIONS:  HTN, fall risk     SUBJECTIVE:    Pt getting nerve block on L side tomorrow on 22  Pt states side lying positional distraction did not help at all. States did better with his position lying prone over end of bed, and supine 90/90 position. Pt states he bought a TENs unit from [address] [employer] [address] and he uses it daily at night, helps him fall asleep. Has not had to take advil at night when using his TENS  Pt declined trying manual lumbar traction again. Pain level:  Currently 6/10 across LB and down L LE to big toe , pain as high as 9/10 in past few days.    At eval:    Constant pain across LS area, L buttock is worst, intermittent radiating pain B LEs L > R, intermittent paresthesias in B LEs. Patient reports pain is  7/10 pain at present and  10/10 pain at its worst.    Plan Moving Forward/ For next visit:   ·  progress strength and flexibility exs as able  · Pain modulation               OBJECTIVE:   FINDINGS:   BONES/ALIGNMENT: There is normal alignment of the spine except anterior   subluxation L5-S1.  The vertebral body heights are maintained. The bone   marrow signal appears unremarkable.       SPINAL CORD: The conus terminates normally.       SOFT TISSUES: No paraspinal mass identified.       L1-2: Moderate circumferential disc marginal osteophyte hypertrophic facet   disease causing moderate central and lateral trefoil type spinal stenosis. Central thecal sac measures 7 mm.  Shortened pedicles noted.       L2-3: Hypertrophic facet disease and mild annular bulge cause moderate   trefoil type spinal stenosis and neural foraminal narrowing.       L3-4: Moderately severe trefoil type spinal stenosis due to hypertrophic   facets and shortened pedicles.       L4-5: Moderate central posterior disc marginal osteophyte and hypertrophic   facet disease causing severe narrowing of the it has thecal sac and neural   foramina.       L5-S1: 7 mm unroofing posterior disc with severe narrowing of the neural   foramina bilaterally.  Central thecal sac patent.           Impression   Grade 1 spondylolisthesis L5-S1.  Multilevel acquired and congenital spinal   stenosis as above.              Exercises/Interventions:     Exercises in bold performed in department today. Items not bolded are carried forward from prior visits for continuity of the record.   Exercise/Equipment Resistance/Repetitions HEP Other comments       []      Knee rocks 1x10 [x]      GS 5 sec 1x10 [x]      TA (no exhale) 5 sec 1x10 [x]      Knee to chest with towel assist under knee 10 sec x 5 B [x]      Supine 90/90 traction with legs up on chair   10-20 minutes at a time, pt do to 1-3x/day for pain relief  [x] Pt states helps temporarily      Positional distraction SL R over roll  Pt inst to do this for 10-30 min at a time, 3x per day or more as needed, and to monitor leg symptoms to see if they improve. [x] Pt states this does not help at all and pt can discontinue. Hip adduction isometric  5 sec, 1x10 [x]      SL clamshells  2x10 B  [x]       Prone hip extension    2x7 [x]         []      scifit   HOLD  Level 1 3 minutes pain increased so pt stopped  [] Pt with significantly increased pain after doing just a few minutes.        []        []        []        []        []        []      Therapeutic Exercise/Home Exercise Program:   40 minutes  HEP has been established, See above, reviewed, progressed, pt given handout(s) of new exercises. Pt given some precautions for use of TENS unit     Therapeutic Activity:  0 minutes     Gait: 0 minutes    Neuromuscular Re-Education:  0 minutes      Canalith Repositioning Procedure:  0 minutes     Manual Therapy:  0 minutes   declined today    Modalities: 0 minutes      ASSESSMENT:  Pt did well with most of session until trying Scifit at end of session. Had significant increase in pain and difficulty walking after, needed a chair to sit due to fear of falling. Pt able to get to table with RW and lied prone which helped until pain decreased. Pt able to ambulate out of dept with cane. GOALS:  Patient stated goal: \" Patient goals : to try to decrease the pain \"  []? Progressing: []? Met: []? Not Met: []? Adjusted         Therapist goals for Patient:   Short Term Goals: To be achieved in: 3 weeks  1. Independent in HEP and progression per patient tolerance, in order to prevent re-injury. []? Progressing: []? Met: []? Not Met: []? Adjusted  2. Patient will have a decrease in pain to facilitate improvement in movement, function, and ADLs as indicated by Functional Deficits. []? Progressing: []? Met: []?  Not Met: []? Adjusted        Long Term Goals: To be achieved in: 6 weeks  1. Disability index score of 16/50 or less for the CHANTE to assist with reaching prior level of function. []? Progressing: []? Met: []? Not Met: []? Adjusted  2. Patient will demonstrate increased AROM to [facility], good LS mobility, good hip ROM to allow for proper joint functioning as indicated by patients Functional Deficits. []? Progressing: []? Met: []? Not Met: []? Adjusted  3. Patient will demonstrate an increase in Strength to good proximal hip and core activation to allow for proper functional mobility as indicated by patients Functional Deficits. []? Progressing: []? Met: []? Not Met: []? Adjusted  4. Patient will be able to ambulate safely with LRAD mod indep for 15 minutes. []? Progressing: []? Met: []? Not Met: []? Adjusted  5. Pt will be able to stand for ADLS for 15 minutes    []? Progressing: []? Met: []? Not Met: []? Adjusted       6. Pt will be able to so 4 steps with step to pattern with 1 rail     []? Progressing: []? Met: []? Not Met: []? Adjusted              Overall Progression Towards Functional goals/ Treatment Progress Update:  [] Patient is progressing as expected towards functional goals listed. [] Progression is slowed due to complexities/Impairments listed. [] Progression has been slowed due to co-morbidities.   [x] Plan just implemented, too soon to assess goals progression <30days   [] Goals require adjustment due to lack of progress  [] Patient is not progressing as expected and requires additional follow up with physician  [] Patient has met goals as marked above   [] Other    Prognosis for POC: [x] Good [x] Fair  [] Poor    Patient requires continued skilled intervention: [x] Yes  [] No    Treatment/Activity Tolerance:  [x] Patient able to complete treatment  [] Patient limited by fatigue  [x] Patient limited by pain    [] Patient limited by other medical complications  [] Other:         PLAN:   [x] Continue per plan of care [] Alter current plan (see comments above)  [] Plan of care initiated [] Hold pending MD visit [] Discharge        Therapeutic Exercise and NMR EXR  [x] (48193) Provided verbal/tactile cueing for activities related to strengthening, flexibility, endurance, ROM  for improvements in proximal strength and core control with self care, mobility, lifting and ambulation.  [] (77080) Provided verbal/tactile cueing for activities related to improving balance, coordination, kinesthetic sense, posture, motor skill, proprioception  to assist with core control in self care, mobility, lifting, and ambulation.      Therapeutic Activities and Gait:    [] (94788 or 41153) Provided verbal/tactile cueing for activities related to improving balance, coordination, kinesthetic sense, posture, motor skill, proprioception and motor activation to allow for proper function  with self care and ADLs  [] (56454) Provided training and instruction to the patient for proper core and proximal hip recruitment and positioning with ambulation re-education     Home Exercise Program:    [x] (57597) Reviewed/Progressed HEP activities related to strengthening, flexibility, endurance, ROM of core, proximal hip and LE for functional self-care, mobility, lifting and ambulation   [] (62955) Reviewed/Progressed HEP activities related to improving balance, coordination, kinesthetic sense, posture, motor skill, proprioception of core, proximal hip and LE for self care, mobility, lifting, and ambulation      Manual Treatments:  PROM / STM / Oscillations-Mobs:  G-I, II, III, IV (PA's, Inf., Post.)  [] (94104) Provided manual therapy to mobilize proximal hip and LS spine soft tissue/joints for the purpose of modulating pain, promoting relaxation,  increasing ROM, reducing/eliminating soft tissue swelling/inflammation/restriction, improving soft tissue extensibility and allowing for proper ROM for normal function with self care, mobility, lifting and ambulation. CRP:  [] (67980) Canalith Repositioning procedure for the assessment, treatment and education of BPPV    Modalities:   [] Ultrasound   [] Estim    [] Mechanical traction    [] Vaso-pneumatic device   [] Ionto     Charges:  Timed Code Treatment Minutes: 40   Total Treatment Minutes: 40     Medicare Cap total YTD:        [x]N/A  Workers Comp Time Stamp  (Per CPT and Total Treatment) [x]N/A   Time In:   Time Out:       [] EVAL    [] Dry Needling  [x] HP(22227)   x   3  [x] EStim Unattended 29683  [] NMR (71978)  x     [] Estim Attended  11182  [] Manual (18112)  x     [] Mechanical Txn 62738  [] TA    x     [] Ultrasound  [] Gait   X    [] Vaso  [] CRP    [] Ionto           [] Other:        Electronically signed by: Bam Dumont PT , OMT-C, [id number]        Note: If patient does not return for scheduled/ recommended follow up visits, this note will serve as a discharge from care along with most recent update on progress.

## 2022-02-21 ENCOUNTER — HOSPITAL ENCOUNTER (OUTPATIENT)
Dept: PHYSICAL THERAPY | Age: 62
Setting detail: THERAPIES SERIES
Discharge: HOME | End: 2022-02-21
Payer: MEDICAID

## 2022-02-21 PROCEDURE — 97110 THERAPEUTIC EXERCISES: CPT

## 2022-02-21 NOTE — PROGRESS NOTES
Physical Therapy Daily Treatment Note    [x]Daily Treatment Note    [x]Progress Note    [x] Discharge Summary         Date:  2022    Patient Name:  Allyssa Castillo    :  1960  MRN: [id number]      Medical/Treatment Diagnosis Information:  · Diagnosis: disc displacement, lumbar M51.26  · Treatment Diagnosis: LBP with radiculopathy, LE weakness, abnormality of gait    Insurance/Certification information:  [address], no stim, ionto, DN, NEEDS AUTH    Physician Information:   Dr. Kushal Prasad, DO    Plan of care signed :  []  Yes  [x] No  []  Cosign [x]  Fax    Date of Patient follow up with Physician: 22 for epidural     Is this a Progress Report:     [x]  Yes  []  No      If Yes:  Date Range for reporting period:  Beginning 22  Ending 22    Progress report will be due (10 Rx or 30 days whichever is less): NA      Recertification will be due (POC Duration  / 90 days whichever is less): NA      Visit # Insurance Allowable Auth Required     Needs auth  APPROVED 48 UNITS OF EX, NEURO, TA, AND MANUAL FROM 22 TO 3/18/22 [x]  Yes []  No        Functional Scale:       Measure used: mod oswesty  Date:  22  Score:  33/50 = 66%    Functional Scale:       Measure used: Hemalatha Garcia  Date:  22  Score:  36/50 = 72%    Latex Allergy:  [x]NO      []YES  Preferred Language for Healthcare:   [x]English       []other:    RESTRICTIONS/PRECAUTIONS:  HTN, fall risk     SUBJECTIVE:    Pt states he was hurting for days after trying to ride nustep last visit. Pt got nerve block on B sides on 22  For a few hours until Friday AM, pt was feeling a bit better. Pt states pain then was really bad Saturday through today. States he doesn't feel like it helped anything on the L side. Feels like L leg is wrapped with an Ace wrap, lots of pressure. Pt has had a GILMAN since having this injection. PT inst pt to call MD office tomorrow.   Pt states the nurse will be calling Thursday to get update with pt. Pt using TENS unit 20 minutes every night. Pt states no overall improvement with PT so far. Pt can only walk or stand for 5 minutes   Pt only has 1 step to get into home, avoids all other steps      Pain level:  Currently 6-7/10 across LB and down L LE to big toe , pain as high as 8/10 in past few days. At eval:    Constant pain across LS area, L buttock is worst, intermittent radiating pain B LEs L > R, intermittent paresthesias in B LEs. Patient reports pain is  7/10 pain at present and  10/10 pain at its worst.    Plan Moving Forward/ For next visit:   · NA              OBJECTIVE:   FINDINGS:   BONES/ALIGNMENT: There is normal alignment of the spine except anterior   subluxation L5-S1.  The vertebral body heights are maintained. The bone   marrow signal appears unremarkable.       SPINAL CORD: The conus terminates normally.       SOFT TISSUES: No paraspinal mass identified.       L1-2: Moderate circumferential disc marginal osteophyte hypertrophic facet   disease causing moderate central and lateral trefoil type spinal stenosis. Central thecal sac measures 7 mm.  Shortened pedicles noted.       L2-3: Hypertrophic facet disease and mild annular bulge cause moderate   trefoil type spinal stenosis and neural foraminal narrowing.       L3-4: Moderately severe trefoil type spinal stenosis due to hypertrophic   facets and shortened pedicles.       L4-5: Moderate central posterior disc marginal osteophyte and hypertrophic   facet disease causing severe narrowing of the it has thecal sac and neural   foramina.       L5-S1: 7 mm unroofing posterior disc with severe narrowing of the neural   foramina bilaterally.  Central thecal sac patent.           Impression   Grade 1 spondylolisthesis L5-S1.  Multilevel acquired and congenital spinal   stenosis as above.              Exercises/Interventions:     Exercises in bold performed in department today.   Items not bolded are carried forward from prior visits for continuity of the record. Exercise/Equipment Resistance/Repetitions HEP Other comments       []      Knee rocks 1x10 [x]      GS 5 sec 1x10 [x]      TA (no exhale) 5 sec 1x10 [x]      Knee to chest with towel assist under knee 10 sec x 5 B [x]      Supine 90/90 traction with legs up on chair   10-20 minutes at a time, pt do to 1-3x/day for pain relief  [x] Pt states helps temporarily      Positional distraction SL R over roll  Pt inst to do this for 10-30 min at a time, 3x per day or more as needed, and to monitor leg symptoms to see if they improve. [x] Pt states this does not help at all and pt can discontinue. Hip adduction isometric  5 sec, 1x10 [x]      SL clamshells  2x10 B  [x]       Prone hip extension    2x7 [x]         []      scifit   HOLD  Level 1 3 minutes pain increased so pt stopped  [] Pt with significantly increased pain after doing just a few minutes.        []        []        []        []        []        []      Therapeutic Exercise/Home Exercise Program:   38 minutes  HEP has been established, See above, reviewed, pt independent with current HEP  Pt given some precautions for use of TENS unit  Pt inst he can use TENS more than once per day. Pt inst in care of electrodes and how to order more. Reassessed for progress note. Therapeutic Activity:  0 minutes     Gait: 0 minutes    Neuromuscular Re-Education:  0 minutes      Canalith Repositioning Procedure:  0 minutes     Manual Therapy:  0 minutes    Modalities: 0 minutes      ASSESSMENT:  No significant improvement in pain levels or function noted with therapy. Unable to progress treatment due to pt's high levels of pain. Pt is independent with his current HEP. GOALS:  Patient stated goal: \" Patient goals : to try to decrease the pain \"  []? Progressing: []? Met: [x]? Not Met: []? Adjusted         Therapist goals for Patient:   Short Term Goals: To be achieved in: 3 weeks  1.  Independent in HEP and progression per patient tolerance, in order to prevent re-injury. []? Progressing: [x]? Met: []? Not Met: []? Adjusted  2. Patient will have a decrease in pain to facilitate improvement in movement, function, and ADLs as indicated by Functional Deficits. []? Progressing: []? Met: [x]? Not Met: []? Adjusted        Long Term Goals: To be achieved in: 6 weeks  1. Disability index score of 16/50 or less for the CHANTE to assist with reaching prior level of function. []? Progressing: []? Met: [x]? Not Met: []? Adjusted  2. Patient will demonstrate [facility], good LS mobility, good hip ROM to allow for proper joint functioning as indicated by patients Functional Deficits. []? Progressing: []? Met: [x]? Not Met: []? Adjusted  3. Patient will demonstrate an increase in Strength to good proximal hip and core activation to allow for proper functional mobility as indicated by patients Functional Deficits. []? Progressing: []? Met: [x]? Not Met: []? Adjusted  4. Patient will be able to ambulate safely with LRAD mod indep for 15 minutes. []? Progressing: []? Met: [x]? Not Met: []? Adjusted  5. Pt will be able to stand for ADLS for 15 minutes    []? Progressing: []? Met: [x]? Not Met: []? Adjusted       6. Pt will be able to so 4 steps with step to pattern with 1 rail     []? Progressing: []? Met: [x]? Not Met: []? Adjusted              Overall Progression Towards Functional goals/ Treatment Progress Update:  [] Patient is progressing as expected towards functional goals listed. [] Progression is slowed due to complexities/Impairments listed. [] Progression has been slowed due to co-morbidities.   [] Plan just implemented, too soon to assess goals progression <30days   [] Goals require adjustment due to lack of progress  [x] Patient is not progressing as expected and requires additional follow up with physician  [] Patient has met goals as marked above   [x] Other  No progress with PT and pt unable to tolerate much in therapy due to high pain levels. Prognosis for POC: [] Good [] Fair  [x] Poor    Patient requires continued skilled intervention: [] Yes  [x] No    Treatment/Activity Tolerance:  [] Patient able to complete treatment  [] Patient limited by fatigue  [x] Patient limited by pain    [] Patient limited by other medical complications  [] Other:         PLAN:   [] Continue per plan of care [] Alter current plan (see comments above)  [] Plan of care initiated [] Hold pending MD visit [x] Discharge to current [facility] due to pt not making progress and not able to tolerate progression in therapy. Therapeutic Exercise and NMR EXR  [x] (26400) Provided verbal/tactile cueing for activities related to strengthening, flexibility, endurance, ROM  for improvements in proximal strength and core control with self care, mobility, lifting and ambulation.  [] (40521) Provided verbal/tactile cueing for activities related to improving balance, coordination, kinesthetic sense, posture, motor skill, proprioception  to assist with core control in self care, mobility, lifting, and ambulation.      Therapeutic Activities and Gait:    [] (83752 or 26019) Provided verbal/tactile cueing for activities related to improving balance, coordination, kinesthetic sense, posture, motor skill, proprioception and motor activation to allow for proper function  with self care and ADLs  [] (50793) Provided training and instruction to the patient for proper core and proximal hip recruitment and positioning with ambulation re-education     Home Exercise Program:    [x] (02217) Reviewed/Progressed HEP activities related to strengthening, flexibility, endurance, ROM of core, proximal hip and LE for functional self-care, mobility, lifting and ambulation   [] (02519) Reviewed/Progressed HEP activities related to improving balance, coordination, kinesthetic sense, posture, motor skill, proprioception of core, proximal hip and LE for self care, mobility, lifting, and ambulation      Manual Treatments:  PROM / STM / Oscillations-Mobs:  G-I, II, III, IV (PA's, Inf., Post.)  [] (73256) Provided manual therapy to mobilize proximal hip and LS spine soft tissue/joints for the purpose of modulating pain, promoting relaxation,  increasing ROM, reducing/eliminating soft tissue swelling/inflammation/restriction, improving soft tissue extensibility and allowing for proper ROM for normal function with self care, mobility, lifting and ambulation. CRP:  [] (54736) Canalith Repositioning procedure for the assessment, treatment and education of BPPV    Modalities:   [] Ultrasound   [] Estim    [] Mechanical traction    [] Vaso-pneumatic device   [] Ionto     Charges:  Timed Code Treatment Minutes: 38   Total Treatment Minutes: 38     Medicare Cap total YTD:        [x]N/A  Workers Comp Time Stamp  (Per CPT and Total Treatment) [x]N/A   Time In:   Time Out:       [] EVAL    [] Dry Needling  [x] CA(49141)   x   3  [x] EStim Unattended 22102  [] NMR (97711)  x     [] Estim Attended  [id number]  [] Manual (50035)  x     [] [place] 28[address]  [] TA    x     [] Ultrasound  [] Gait   X    [] Vaso  [] CRP    [] Ionto           [] Other:        Electronically signed by: Иван Calvo PT , OMT-C, [id number]        Note: If patient does not return for scheduled/ recommended follow up visits, this note will serve as a discharge from care along with most recent update on progress.

## 2022-02-23 ENCOUNTER — APPOINTMENT (OUTPATIENT)
Dept: CT IMAGING | Age: 62
End: 2022-02-23
Payer: MEDICAID

## 2022-02-23 ENCOUNTER — NURSE TRIAGE (OUTPATIENT)
Dept: OTHER | Facility: CLINIC | Age: 62
End: 2022-02-23

## 2022-02-23 ENCOUNTER — APPOINTMENT (OUTPATIENT)
Dept: GENERAL RADIOLOGY | Age: 62
End: 2022-02-23
Payer: MEDICAID

## 2022-02-23 ENCOUNTER — HOSPITAL ENCOUNTER (EMERGENCY)
Age: 62
Discharge: HOME | End: 2022-02-23
Attending: EMERGENCY MEDICINE
Payer: MEDICAID

## 2022-02-23 ENCOUNTER — APPOINTMENT (OUTPATIENT)
Dept: PHYSICAL THERAPY | Age: 62
End: 2022-02-23
Payer: MEDICAID

## 2022-02-23 VITALS
SYSTOLIC BLOOD PRESSURE: 123 MMHG | TEMPERATURE: 97.1 F | RESPIRATION RATE: 16 BRPM | WEIGHT: 315 LBS | BODY MASS INDEX: 45.1 KG/M2 | DIASTOLIC BLOOD PRESSURE: 101 MMHG | OXYGEN SATURATION: 95 % | HEART RATE: 63 BPM | HEIGHT: 70 IN

## 2022-02-23 DIAGNOSIS — R51.9 ACUTE NONINTRACTABLE HEADACHE, UNSPECIFIED HEADACHE TYPE: Primary | ICD-10-CM

## 2022-02-23 LAB
A/G RATIO: 1.4 (ref 1.1–2.2)
ALBUMIN SERPL-MCNC: 4.3 G/DL (ref 3.4–5)
ALP BLD-CCNC: 77 U/L (ref 40–129)
ALT SERPL-CCNC: 23 U/L (ref 10–40)
ANION GAP SERPL CALCULATED.3IONS-SCNC: 13 MMOL/L (ref 3–16)
ANISOCYTOSIS: ABNORMAL
AST SERPL-CCNC: 18 U/L (ref 15–37)
BASOPHILS ABSOLUTE: 0 K/UL (ref 0–0.2)
BASOPHILS RELATIVE PERCENT: 0 %
BILIRUB SERPL-MCNC: 0.5 MG/DL (ref 0–1)
BILIRUBIN URINE: ABNORMAL
BLOOD, URINE: NEGATIVE
BUN BLDV-MCNC: 24 MG/DL (ref 7–20)
CALCIUM SERPL-MCNC: 9.9 MG/DL (ref 8.3–10.6)
CHLORIDE BLD-SCNC: 103 MMOL/L (ref 99–110)
CHP ED QC CHECK: NORMAL
CLARITY: CLEAR
CO2: 23 MMOL/L (ref 21–32)
COLOR: YELLOW
CREAT SERPL-MCNC: 0.8 MG/DL (ref 0.8–1.3)
EKG ATRIAL RATE: 67 BPM
EKG DIAGNOSIS: NORMAL
EKG P AXIS: -10 DEGREES
EKG P-R INTERVAL: 134 MS
EKG Q-T INTERVAL: 398 MS
EKG QRS DURATION: 92 MS
EKG QTC CALCULATION (BAZETT): 420 MS
EKG R AXIS: 35 DEGREES
EKG T AXIS: 54 DEGREES
EKG VENTRICULAR RATE: 67 BPM
EOSINOPHILS ABSOLUTE: 0 K/UL (ref 0–0.6)
EOSINOPHILS RELATIVE PERCENT: 0 %
GFR AFRICAN AMERICAN: >60
GFR NON-AFRICAN AMERICAN: >60
GLUCOSE BLD-MCNC: 148 MG/DL
GLUCOSE BLD-MCNC: 148 MG/DL (ref 70–99)
GLUCOSE BLD-MCNC: 164 MG/DL (ref 70–99)
GLUCOSE URINE: NEGATIVE MG/DL
HCT VFR BLD CALC: 47.4 % (ref 40.5–52.5)
HEMOGLOBIN: 16.4 G/DL (ref 13.5–17.5)
INFLUENZA A: NOT DETECTED
INFLUENZA B: NOT DETECTED
INR BLD: 1.05 (ref 0.88–1.12)
KETONES, URINE: NEGATIVE MG/DL
LEUKOCYTE ESTERASE, URINE: NEGATIVE
LIPASE: 35 U/L (ref 13–60)
LYMPHOCYTES ABSOLUTE: 1.4 K/UL (ref 1–5.1)
LYMPHOCYTES RELATIVE PERCENT: 11 %
MCH RBC QN AUTO: 32.6 PG (ref 26–34)
MCHC RBC AUTO-ENTMCNC: 34.5 G/DL (ref 31–36)
MCV RBC AUTO: 94.2 FL (ref 80–100)
MICROSCOPIC EXAMINATION: ABNORMAL
MONOCYTES ABSOLUTE: 0.4 K/UL (ref 0–1.3)
MONOCYTES RELATIVE PERCENT: 3 %
NEUTROPHILS ABSOLUTE: 11.1 K/UL (ref 1.7–7.7)
NEUTROPHILS RELATIVE PERCENT: 86 %
NITRITE, URINE: NEGATIVE
PDW BLD-RTO: 12.4 % (ref 12.4–15.4)
PERFORMED ON: ABNORMAL
PH UA: 6 (ref 5–8)
PLATELET # BLD: 303 K/UL (ref 135–450)
PMV BLD AUTO: 8.2 FL (ref 5–10.5)
POTASSIUM REFLEX MAGNESIUM: 4 MMOL/L (ref 3.5–5.1)
PROTEIN UA: NEGATIVE MG/DL
PROTHROMBIN TIME: 11.9 SEC (ref 9.9–12.7)
RBC # BLD: 5.03 M/UL (ref 4.2–5.9)
SARS-COV-2 RNA, RT PCR: NOT DETECTED
SODIUM BLD-SCNC: 139 MMOL/L (ref 136–145)
SPECIFIC GRAVITY UA: 1.02 (ref 1–1.03)
TOTAL PROTEIN: 7.4 G/DL (ref 6.4–8.2)
TROPONIN: <0.01 NG/ML
URINE REFLEX TO CULTURE: ABNORMAL
URINE TYPE: ABNORMAL
UROBILINOGEN, URINE: 0.2 E.U./DL
WBC # BLD: 12.9 K/UL (ref 4–11)

## 2022-02-23 PROCEDURE — 81003 URINALYSIS AUTO W/O SCOPE: CPT

## 2022-02-23 PROCEDURE — 85610 PROTHROMBIN TIME: CPT

## 2022-02-23 PROCEDURE — 99283 EMERGENCY DEPT VISIT LOW MDM: CPT

## 2022-02-23 PROCEDURE — 87636 SARSCOV2 & INF A&B AMP PRB: CPT

## 2022-02-23 PROCEDURE — 85025 COMPLETE CBC W/AUTO DIFF WBC: CPT

## 2022-02-23 PROCEDURE — 96374 THER/PROPH/DIAG INJ IV PUSH: CPT

## 2022-02-23 PROCEDURE — 96361 HYDRATE IV INFUSION ADD-ON: CPT

## 2022-02-23 PROCEDURE — 80053 COMPREHEN METABOLIC PANEL: CPT

## 2022-02-23 PROCEDURE — 93005 ELECTROCARDIOGRAM TRACING: CPT | Performed by: NURSE PRACTITIONER

## 2022-02-23 PROCEDURE — 6360000004 HC RX CONTRAST MEDICATION: Performed by: EMERGENCY MEDICINE

## 2022-02-23 PROCEDURE — 70450 CT HEAD/BRAIN W/O DYE: CPT

## 2022-02-23 PROCEDURE — 93010 ELECTROCARDIOGRAM REPORT: CPT | Performed by: INTERNAL MEDICINE

## 2022-02-23 PROCEDURE — 96375 TX/PRO/DX INJ NEW DRUG ADDON: CPT

## 2022-02-23 PROCEDURE — 2580000003 HC RX 258: Performed by: EMERGENCY MEDICINE

## 2022-02-23 PROCEDURE — 84484 ASSAY OF TROPONIN QUANT: CPT

## 2022-02-23 PROCEDURE — 83690 ASSAY OF LIPASE: CPT

## 2022-02-23 PROCEDURE — 6360000002 HC RX W HCPCS: Performed by: NURSE PRACTITIONER

## 2022-02-23 PROCEDURE — 71045 X-RAY EXAM CHEST 1 VIEW: CPT

## 2022-02-23 PROCEDURE — 70498 CT ANGIOGRAPHY NECK: CPT

## 2022-02-23 PROCEDURE — 36415 COLL VENOUS BLD VENIPUNCTURE: CPT

## 2022-02-23 RX ORDER — DIPHENHYDRAMINE HYDROCHLORIDE 50 MG/ML
25 INJECTION INTRAMUSCULAR; INTRAVENOUS ONCE
Status: COMPLETED | OUTPATIENT
Start: 2022-02-23 | End: 2022-02-23

## 2022-02-23 RX ORDER — 0.9 % SODIUM CHLORIDE 0.9 %
1000 INTRAVENOUS SOLUTION INTRAVENOUS ONCE
Status: COMPLETED | OUTPATIENT
Start: 2022-02-23 | End: 2022-02-23

## 2022-02-23 RX ORDER — KETOROLAC TROMETHAMINE 30 MG/ML
15 INJECTION, SOLUTION INTRAMUSCULAR; INTRAVENOUS ONCE
Status: COMPLETED | OUTPATIENT
Start: 2022-02-23 | End: 2022-02-23

## 2022-02-23 RX ORDER — PROCHLORPERAZINE EDISYLATE 5 MG/ML
5 INJECTION INTRAMUSCULAR; INTRAVENOUS ONCE
Status: COMPLETED | OUTPATIENT
Start: 2022-02-23 | End: 2022-02-23

## 2022-02-23 RX ADMIN — SODIUM CHLORIDE 1000 ML: 9 INJECTION, SOLUTION INTRAVENOUS at 16:27

## 2022-02-23 RX ADMIN — KETOROLAC TROMETHAMINE 15 MG: 30 INJECTION, SOLUTION INTRAMUSCULAR at 16:56

## 2022-02-23 RX ADMIN — IOPAMIDOL 85 ML: 755 INJECTION, SOLUTION INTRAVENOUS at 15:50

## 2022-02-23 RX ADMIN — PROCHLORPERAZINE EDISYLATE 5 MG: 5 INJECTION INTRAMUSCULAR; INTRAVENOUS at 18:01

## 2022-02-23 RX ADMIN — DIPHENHYDRAMINE HYDROCHLORIDE 25 MG: 50 INJECTION INTRAMUSCULAR; INTRAVENOUS at 18:01

## 2022-02-23 ASSESSMENT — ENCOUNTER SYMPTOMS
COUGH: 0
EYE PAIN: 0
BACK PAIN: 0
ABDOMINAL PAIN: 1
DIARRHEA: 0
SORE THROAT: 0
NAUSEA: 1
RHINORRHEA: 0
SHORTNESS OF BREATH: 0
BLOOD IN STOOL: 0
VOMITING: 0

## 2022-02-23 ASSESSMENT — PAIN SCALES - GENERAL
PAINLEVEL_OUTOF10: 8
PAINLEVEL_OUTOF10: 8
PAINLEVEL_OUTOF10: 6

## 2022-02-23 ASSESSMENT — PAIN - FUNCTIONAL ASSESSMENT: PAIN_FUNCTIONAL_ASSESSMENT: 0-10

## 2022-02-23 ASSESSMENT — PAIN DESCRIPTION - LOCATION: LOCATION: HEAD

## 2022-02-23 ASSESSMENT — PAIN DESCRIPTION - PAIN TYPE: TYPE: ACUTE PAIN

## 2022-02-23 NOTE — ED PROVIDER NOTES
[address] ED  EMERGENCY DEPARTMENT ENCOUNTER        Pt Name: Kim Stern  MRN: [id number]  [place] 1960  Date of evaluation: 2/23/2022  Provider: KAMILLE Blake - ALEJANDRO  PCP: Dionte Buck MD  Note Started: 4:38 PM EST       I have seen and evaluated this patient with my supervising physician [facility] PHYSICAL REHABILITATION, DO. Triage CHIEF COMPLAINT       Chief Complaint   Patient presents with    Headache     Pt states that he had a spinal nerve block on Thursday. Pt states that on Friday morning he woke up with a headache and upset stomach. Pt states the headache has gotten worse. HISTORY OF PRESENT ILLNESS   (Location/Symptom, Timing/Onset, Context/Setting, Quality, Duration, Modifying Factors, Severity)  Note limiting factors. Chief Complaint: Headache     Kim Stern is a 59 y.o. male who presents emergency department with symptoms of headache. Patient states that he began with headache since Friday on wakening. Patient states he is also had some general fatigue and nausea as well. States he has had some mild lightheadedness. Reports to me that he concerns may be due to effect of his recent joint injection into the sacroiliac joint. He states that he received 2 injections on Thursday prior to his headache symptoms starting. He states that he has had the injections in the past without any difficulty or any side effects. He states that since the injections upon waking the next morning he continued to have general headache. Is any neck pain or fever. No chest pain or shortness of breath. Does have some mild abdominal pain but denies any vomiting or diarrhea. Nursing Notes were all reviewed and agreed with or any disagreements were addressed in the HPI. REVIEW OF SYSTEMS    (2-9 systems for level 4, 10 or more for level 5)     Review of Systems   Constitutional: Negative for chills, diaphoresis and fever.    HENT: Negative for congestion, ear pain, rhinorrhea and sore throat. Eyes: Negative for pain and visual disturbance. Respiratory: Negative for cough and shortness of breath. Cardiovascular: Negative for chest pain and leg swelling. Gastrointestinal: Positive for abdominal pain and nausea. Negative for blood in stool, diarrhea and vomiting. Genitourinary: Negative for difficulty urinating, dysuria, flank pain and frequency. Musculoskeletal: Negative for back pain and neck pain. Skin: Negative for rash and wound. Neurological: Positive for light-headedness and headaches. Negative for tremors, seizures, syncope, speech difficulty and weakness. PAST MEDICAL HISTORY     Past Medical History:   Diagnosis Date    Acquired hypothyroidism 6/14/2021    Arthritis     High blood pressure     History of influenza vaccine allergy 11/17/2017    HTN (hypertension)     Hyperlipidemia LDL goal < 100     Insomnia     Lumbosacral radiculopathy-L5 by EMG     epidural spring 6114    Metabolic syndrome X     Nephrolithiasis 7/3/2013    Obesity     Pneumonia 2008    Tinnitus 12/20/2012       SURGICAL HISTORY     Past Surgical History:   Procedure Laterality Date    CHOLECYSTECTOMY  03/2005    lap    LITHOTRIPSY Left 3/16/2021    CYSTOSCOPY RETROGRADE PYELOGRAM, LEFT URETEROSCOPY, LEFT  LASER LITHOTRIPSY, STENT INSERTION performed by Mike Gold [facility] at [facility]       Previous Medications    AMITRIPTYLINE (ELAVIL) 50 MG TABLET    Take 1 tablet by mouth nightly    AMLODIPINE (NORVASC) 5 MG TABLET    Take 1 tablet by mouth daily    ASPIRIN 81 MG TABLET    Take 81 mg by mouth daily. BENAZEPRIL (LOTENSIN) 40 MG TABLET    TAKE ONE TABLET BY MOUTH DAILY    ETODOLAC (LODINE) 400 MG TABLET    TAKE ONE TABLET BY MOUTH TWICE DAILY AS NEEDED    GABAPENTIN (NEURONTIN) 600 MG TABLET    Take 600 mg by mouth 3 times daily.     IBUPROFEN (ADVIL;MOTRIN) 800 MG TABLET    Take 0.5 tablets by mouth every 8 hours as needed for Pain    LEVOTHYROXINE (SYNTHROID) 75 MCG TABLET    Take 1 tablet by mouth daily NEW DOSAGE    METFORMIN (GLUCOPHAGE) 1000 MG TABLET    Take 2 tablets by mouth daily (with breakfast)    METOPROLOL SUCCINATE (TOPROL XL) 50 MG EXTENDED RELEASE TABLET    Take 1 tablet by mouth daily    MULTIVITAMIN (THERAGRAN) PER TABLET    Take 1 tablet by mouth daily. OMEPRAZOLE (PRILOSEC) 20 MG DELAYED RELEASE CAPSULE    Take 1 capsule by mouth every morning (before breakfast)    PRAVASTATIN (PRAVACHOL) 20 MG TABLET    Take 1 tablet by mouth nightly       ALLERGIES     Influenza vaccines and Codeine    FAMILYHISTORY       Family History   Problem Relation Age of Onset    Alcohol Abuse Mother     Diabetes Father     Heart Disease Father     Stroke Father     Kidney Disease Other     Diabetes Sister     Stroke Sister     Coronary Art Dis Sister         SOCIAL HISTORY       Social History     Socioeconomic History    Marital status:      Spouse name: None    Number of children: None    Years of education: None    Highest education level: None   Occupational History    Occupation:    Tobacco Use    Smoking status: Never Smoker    Smokeless tobacco: Never Used    Tobacco comment: Advised to not start   Substance and Sexual Activity    Alcohol use: Not Currently     Alcohol/week: 0.0 standard drinks     Comment: 3 beers/9 weeks    Drug use: No    Sexual activity: None   Other Topics Concern    None   Social History Narrative    /deliveries    Lives alone. Exercise: physical job loading trucks     Seatbelt use: Always    Living will: no,   additional information provided             Social Determinants of Health     Financial Resource Strain:     Difficulty of Paying Living Expenses: Not on file   Food Insecurity:     Worried About Running Out of Food in the Last Year: Not on file    Raymond of Food in the Last Year: Not on file   Transportation Needs:     Lack of Transportation (Medical):  Not on file  Lack of Transportation (Non-Medical): Not on file   Physical Activity:     Days of Exercise per Week: Not on file    Minutes of Exercise per Session: Not on file   Stress:     Feeling of Stress : Not on file   Social Connections:     Frequency of Communication with Friends and Family: Not on file    Frequency of Social Gatherings with Friends and Family: Not on file    Attends Jewish Services: Not on file    Active Member of [address] [place] [address] [employer] or Organizations: Not on file    Attends Club or Organization Meetings: Not on file    Marital Status: Not on file   Intimate Partner Violence:     Fear of Current or Ex-Partner: Not on file    Emotionally Abused: Not on file    Physically Abused: Not on file    Sexually Abused: Not on file   Housing Stability:     Unable to Pay for Housing in the Last Year: Not on file    Number of [place] in the Last Year: Not on file    Unstable Housing in the Last Year: Not on file       SCREENINGS   NIH Stroke Scale  Interval: Baseline  Level of Consciousness (1a. ): Alert  LOC Questions (1b. ):  Answers both correctly  LOC Commands (1c. ): Performs both tasks correctly  Best Gaze (2. ): Normal  Visual (3. ): No visual loss  Facial Palsy (4. ): Normal symmetrical movement  Motor Arm, Left (5a. ): No drift  Motor Arm, Right (5b. ): No drift  Motor Leg, Left (6a. ): No drift  Motor Leg, Right (6b. ): No drift  Limb Ataxia (7. ): Absent  Sensory (8. ): Normal  Best Language (9. ): No aphasia  Dysarthria (10. ): Normal  Extinction and Inattention (11): No abnormality  Total: 0Glasgow Coma Scale  Eye Opening: Spontaneous  Best Verbal Response: Oriented  Best Motor Response: Obeys commands  Love Coma Scale Score: 15        PHYSICAL EXAM    (up to 7 for level 4, 8 or more for level 5)     ED Triage Vitals [02/23/22 1417]   BP Temp Temp Source Pulse Resp SpO2 Height Weight   (!) 162/92 97.1 °F (36.2 °C) Temporal 76 20 100 % 5' 10\" (1.778 m) (!) 341 lb (154.7 kg)       Physical Exam  Vitals and nursing note reviewed. Constitutional:       Appearance: Normal appearance. He is not toxic-appearing or diaphoretic. HENT:      Head: Normocephalic and atraumatic. Nose: Nose normal.   Eyes:      General:         Right eye: No discharge. Left eye: No discharge. Cardiovascular:      Rate and Rhythm: Normal rate and regular rhythm. Pulses: Normal pulses. Heart sounds: No murmur heard. Pulmonary:      Effort: Pulmonary effort is normal. No respiratory distress. Abdominal:      Palpations: Abdomen is soft. Tenderness: There is no abdominal tenderness. There is no guarding or rebound. Musculoskeletal:         General: Normal range of motion. Cervical back: Normal range of motion and neck supple. Skin:     General: Skin is warm and dry. Neurological:      General: No focal deficit present. Mental Status: He is alert and oriented to person, place, and time. GCS: GCS eye subscore is 4. GCS verbal subscore is 5. GCS motor subscore is 6. Sensory: No sensory deficit. Motor: No weakness.    Psychiatric:         Mood and Affect: Mood normal.         Behavior: Behavior normal.         DIAGNOSTIC RESULTS   LABS:    Labs Reviewed   CBC WITH AUTO DIFFERENTIAL - Abnormal; Notable for the following components:       Result Value    WBC 12.9 (*)     Neutrophils Absolute 11.1 (*)     Anisocytosis Occasional (*)     All other components within normal limits    Narrative:     Performed at:  [facility],  [place] [facility]   Phone [phone]   COMPREHENSIVE METABOLIC PANEL W/ REFLEX TO MG FOR LOW K - Abnormal; Notable for the following components:    Glucose 164 (*)     BUN 24 (*)     All other components within normal limits    Narrative:     Performed at:  [facility],  [place] [facility]   Phone [phone]   [address] CULTURE - Abnormal; Notable for the following components:    Bilirubin Urine MODERATE (*)     All other components within normal limits    Narrative:     Performed at:  [facility] [address],  [place], [place]   Phone [phone]   POCT GLUCOSE - Abnormal; Notable for the following components:    POC Glucose 148 (*)     All other components within normal limits    Narrative:     Performed at:  [facility] [address],  [place], [place] [employer]   Phone [phone]   POCT GLUCOSE - Normal   COVID-19 & INFLUENZA COMBO    Narrative:     Performed at:  [facility] [address],  [place], [place] [employer]   Phone [phone]   TROPONIN    Narrative:     Performed at:  [facility] [address],  [place], [place]   Phone [phone]   PROTIME-INR    Narrative:     Performed at:  [facility] [address],  [place], [place] [employer]   Phone [phone]   LIPASE    Narrative:     Performed at:  [facility]) [facility],  [place], [place] [employer]   Phone [phone]       When ordered, only abnormal lab results are displayed. All other labs were within normal range or not returned as of this dictation. EKG: When ordered, EKG's are interpreted by the Emergency Department Physician in the absence of a cardiologist.  Please see their note for interpretation of EKG. RADIOLOGY:   Non-plain film images such as CT, Ultrasound and MRI are read by the radiologist. Plain radiographic images are visualized andpreliminarily interpreted by the  ED Provider with the below findings:        Interpretation perthe Radiologist below, if available at the time of this note:     [facility]   Final Result   1. Unremarkable CTA of the head and neck.    2. Partially included in the field view of this study is a 2.4 x 1.9 cm right   paratracheal lymph node. Dedicated CT of the chest is recommended for   further evaluation. RECOMMENDATIONS:   Unavailable         CT HEAD WO CONTRAST   Final Result   No acute intracranial abnormality. XR CHEST PORTABLE   Final Result   No radiographic evidence of acute pulmonary disease. CT HEAD WO CONTRAST    Result Date: 2/23/2022  EXAMINATION: CT OF THE HEAD WITHOUT CONTRAST  2/23/2022 4:05 pm TECHNIQUE: CT of the head was performed without the administration of intravenous contrast. Dose modulation, iterative reconstruction, and/or weight based adjustment of the mA/kV was utilized to reduce the radiation dose to as low as reasonably achievable. COMPARISON: None. HISTORY: ORDERING SYSTEM PROVIDED HISTORY: Stroke Symptoms TECHNOLOGIST PROVIDED HISTORY: Has a \"code stroke\" or \"stroke alert\" been called? ->No Reason for exam:->Stroke Symptoms Decision Support Exception - unselect if not a suspected or confirmed emergency medical condition->Emergency Medical Condition (MA) Reason for Exam: stroke like symptoms and severe headache no history of headache FINDINGS: BRAIN/VENTRICLES: There is no acute intracranial hemorrhage, mass effect or midline shift. No abnormal extra-axial fluid collection. The gray-white differentiation is maintained without evidence of an acute infarct. There is no evidence of hydrocephalus. Mild prominence of the ventricles and sulci due to global parenchymal volume loss. ORBITS: The visualized portion of the orbits demonstrate no acute abnormality. SINUSES: The visualized paranasal sinuses and mastoid air cells demonstrate no acute abnormality. SOFT TISSUES/SKULL:  No acute abnormality of the visualized skull or soft tissues. No acute intracranial abnormality. XR CHEST PORTABLE    Result Date: 2/23/2022  EXAMINATION: ONE XRAY VIEW OF THE CHEST 2/23/2022 2:54 pm COMPARISON: Chest x-ray dated 12/28/2015.  HISTORY: ORDERING SYSTEM PROVIDED HISTORY: stroke symptoms TECHNOLOGIST PROVIDED HISTORY: Reason for exam:->stroke symptoms Reason for Exam: stroke symptoms FINDINGS: HEART/MEDIASTINUM: The cardiomediastinal silhouette is stable. PLEURA/LUNGS: There are no focal consolidations or pleural effusions. There is no appreciable pneumothorax. BONES/SOFT TISSUE: No acute abnormality. No radiographic evidence of acute pulmonary disease. CTA HEAD NECK W CONTRAST    Result Date: 2/23/2022  EXAMINATION: CTA OF THE HEAD AND NECK WITH CONTRAST 2/23/2022 4:07 pm: TECHNIQUE: CTA of the head and neck was performed with the administration of intravenous contrast. Multiplanar reformatted images are provided for review. MIP images are provided for review. Stenosis of the internal carotid arteries measured using NASCET criteria. Dose modulation, iterative reconstruction, and/or weight based adjustment of the mA/kV was utilized to reduce the radiation dose to as low as reasonably achievable. COMPARISON: None. HISTORY: ORDERING SYSTEM PROVIDED HISTORY: Stroke Symptoms TECHNOLOGIST PROVIDED HISTORY: Has a \"code stroke\" or \"stroke alert\" been called? ->No Reason for exam:->Stroke Symptoms Decision Support Exception - unselect if not a suspected or confirmed emergency medical condition->Emergency Medical Condition (MA) Reason for Exam: stroke like symtoms, severe headache, no history of headache FINDINGS: CTA NECK: AORTIC ARCH/ARCH VESSELS: No dissection or arterial injury. No significant stenosis of the brachiocephalic or subclavian arteries. CAROTID ARTERIES: No dissection, arterial injury, or hemodynamically significant stenosis by NASCET criteria. VERTEBRAL ARTERIES: No dissection, arterial injury, or significant stenosis. SOFT TISSUES: The lung apices are clear. Partially included in the field of view of this study is an enlarged 2.4 x 1.9 cm right paratracheal lymph node. .  The larynx and pharynx are unremarkable.   No acute abnormality of the salivary and thyroid glands. BONES: No acute osseous abnormality. CTA HEAD: ANTERIOR CIRCULATION: No significant stenosis of the intracranial internal carotid, anterior cerebral, or middle cerebral arteries. No aneurysm. POSTERIOR CIRCULATION: No significant stenosis of the vertebral, basilar, or posterior cerebral arteries. No aneurysm. OTHER: No dural venous sinus thrombosis on this non-dedicated study. BRAIN: No mass effect or midline shift. No extra-axial fluid collection. The gray-white differentiation is maintained. 1. Unremarkable CTA of the head and neck. 2. Partially included in the field view of this study is a 2.4 x 1.9 cm right paratracheal lymph node. Dedicated CT of the chest is recommended for further evaluation. RECOMMENDATIONS: Unavailable         PROCEDURES   Unless otherwise noted below, none     Procedures    CRITICAL CARE TIME   N/A    CONSULTS:  IP CONSULT TO PHARMACY  PHARMACY TO CHANGE BASE FLUIDS      EMERGENCY DEPARTMENT COURSE and DIFFERENTIAL DIAGNOSIS/MDM:   Vitals:    Vitals:    02/23/22 1530 02/23/22 1629 02/23/22 1643 02/23/22 1733   BP: 136/76 134/76 126/69 138/85   Pulse: 71 72 75 63   Resp: 20 18 18 24   Temp:       TempSrc:       SpO2: 96% 96% 93% 97%   Weight:       Height:           Patient was given thefollowing medications:  Medications   iopamidol (ISOVUE-370) 76 % injection 85 mL (85 mLs IntraVENous Given 2/23/22 1550)   0.9 % sodium chloride bolus (0 mLs IntraVENous Stopped 2/23/22 1801)   ketorolac (TORADOL) injection 15 mg (15 mg IntraVENous Given 2/23/22 1656)   diphenhydrAMINE (BENADRYL) injection 25 mg (25 mg IntraVENous Given 2/23/22 1801)   prochlorperazine (COMPAZINE) injection 5 mg (5 mg IntraVENous Given 2/23/22 1801)           Patient be discharged home in good condition. Patient's CT scan of the head neck read as negative. Patient's work-up in the emergency department is benign. Patient states that his headache is resolved.   He states he feels much better at this time. He has no symptoms of neck pain or back pain. No chest pain or shortness of breath. At this time patient's NIH score is 0. I have low concern for acute ischemic or hemorrhagic stroke. Have low concern for meningitis. I have low suspicion that this is any complication from the recent procedure. Patient to be discharged home in good condition. Patient states he feels much better at this time. Patient is going to follow-up family doctor next 2 to 3 days. FINAL IMPRESSION      1.  Acute nonintractable headache, unspecified headache type          DISPOSITION/PLAN   DISPOSITION Decision To Discharge 02/23/2022 06:43:45 PM      PATIENT REFERREDTO:  Althea Carvajal MD  [address] [place] [address]  [address] [place] [address]  [phone]    Schedule an appointment as soon as possible for a visit         DISCHARGE MEDICATIONS:  New Prescriptions    No medications on file       DISCONTINUED MEDICATIONS:  Discontinued Medications    No medications on file              (Please note that portions ofthis note were completed with a voice recognition program.  Efforts were made to edit the dictations but occasionally words are mis-transcribed.)    KAMILLE Markham CNP (electronically signed)            KAMILLE Markham CNP  02/23/22 1079

## 2022-02-23 NOTE — TELEPHONE ENCOUNTER
Received call from Rashaad Sherwood at [facility] with Red Flag Complaint. Subjective: Caller states \"Last Thursday I had 2 nerve blocks put in. On Friday I started developing this pain in my head that is constant and dull. If you put a bowl over my head that is all the area that is hurting right now. \"     Current Symptoms:   Last Thursday x2 nerve blocks  Friday- headaches  Back of head and pressure behind eyeballs  Tight feeling when touching chin to chest  Sour/burning feeling in stomach  fatigue    Onset: 6 days ago; gradual    Associated Symptoms: NA    Pain Severity: 8/10; dull aching; constant    Temperature: denies    What has been tried: saw eye doctor, tylenol    LMP: NA Pregnant: NA    Recommended disposition: To be seen within 24 hours    Advised to utilize ED/UCC if no available appmts     Care advice provided, patient verbalizes understanding; denies any other questions or concerns; instructed to call back for any new or worsening symptoms. Patient/Caller agrees with recommended disposition; writer provided warm transfer to Angelique Evans at [facility] for appointment scheduling     Attention Provider: Thank you for allowing me to participate in the care of your patient. The patient was connected to triage in response to information provided to the ECC/PSC. Please do not respond through this encounter as the response is not directed to a shared pool.         Reason for Disposition   [1] MODERATE headache (e.g., interferes with normal activities) AND [2] present > 24 hours AND [3] unexplained  (Exceptions: analgesics not tried, typical migraine, or headache part of viral illness)    Protocols used: HEADACHE-ADULT-

## 2022-02-24 NOTE — ED NOTES
Writer educated pt on needing to find a ride d/t potential drowsiness caused by IV meds administered. Pt verbalized understanding.      Pillo Patel RN  02/23/22 2130

## 2022-02-24 NOTE — ED NOTES
Patient discharged in stable, ambulatory condition with all documented belongings. This nurse reviewed discharge instructions, pt verbalized understanding.        Jen Tee RN  02/23/22 7464

## 2022-02-25 ENCOUNTER — TELEPHONE (OUTPATIENT)
Dept: FAMILY MEDICINE CLINIC | Age: 62
End: 2022-02-25

## 2022-02-25 NOTE — TELEPHONE ENCOUNTER
Pt is scheduled to be seen for a VV at 10:30   Doy uo want to see him person or is a VV okay?   Please advise

## 2022-02-25 NOTE — ED PROVIDER NOTES
I personally saw Berry Rodriguez and performed a substantive portion of the visit including all aspects of the medical decision making. .    In brief, this is a 55-year-old male presenting for evaluation of headache. He states that he started developing a headache on Friday morning when he woke up. He describes a headache as a bandlike sensation around his head. He does state that he had to joint injections on Thursday, it appears to be triamcinolone that was injected into his SI joints, not interspinal.  He states that his headache started on Friday morning he does not know if these events are related. He denies any vision changes, or vomiting but has had some nausea. He denies chest pain. He denies any fevers. On exam, he is nontoxic-appearing. Neck is supple without rigidity there is no meningismus. Heart is regular rate and rhythm and lungs are clear to auscultation diffusely. Abdomen is soft, nontender nondistended there are no peritoneal signs. Strength 5/5 in UE and LE bilaterally. Sensation intact x 4. No aphasia or dysarthria. CN 2-12 intact. No facial droop. No limb or gait ataxia. The Ekg interpreted by me shows  normal sinus rhythm with a rate of 67  Axis is   Normal  QTc is  within an acceptable range  Intervals and Durations are unremarkable. ST Segments: normal    ED course: This is a 55-year-old male presenting for evaluation of headache. His vital signs show mild hypertension otherwise they are within normal limits, he is afebrile. He is nontoxic-appearing on exam, neurologically intact. He does describe a headache since Friday morning after SI injections on Thursday, unclear if these events are related potentially to positioning on the table on Thursday for the procedure. He is initially treated with Toradol here for his headache, his CT head and CTA are unremarkable for acute process.   He has a nonspecific leukocytosis of 12.9, otherwise lab work is largely unremarkable he is mildly dehydrated however no BRADY. He is given fluids as well. His abdominal exam is benign and he does not have tenderness, I do not feel that CT is indicated. He primarily is describing his headache. I did discuss with him that to fully rule out subarachnoid hemorrhage or meningitis we would need to perform an LP, however the patient declines stating that his headache is improved after Compazine and Benadryl and wishes to be discharged home to follow-up with primary care. He is given strict return precautions back to the ED and voices understanding. All diagnostic, treatment, and disposition decisions were made by myself in conjunction with the advanced practice provider. For all further details of the patient's emergency department visit, please see the advanced practice provider's documentation. Comment: Please note this report has been produced using speech recognition software and may contain errors related to that system including errors in grammar, punctuation, and spelling, as well as words and phrases that may be inappropriate. If there are any questions or concerns please feel free to contact the dictating provider for clarification.          DO Abram  02/25/22 Jose Segovia DO  02/25/22 [id number]

## 2022-02-25 NOTE — TELEPHONE ENCOUNTER
----- Message from Stacey Sinha sent at 2/25/2022  9:12 AM EST -----  Subject: Appointment Request    Reason for Call: Urgent (Patient Request) ED Follow Up Visit    QUESTIONS  Type of Appointment? Established Patient  Reason for appointment request? Available appointments did not meet   patient need  Additional Information for Provider? Pt was at the [place] ED on 2-23 for   a bad headache. They ran a lot of tests and he still has the headache. Pt   would like to see Dr. Dahlia House next week sometime. Would like a call back  ---------------------------------------------------------------------------  --------------  CALL BACK INFO  What is the best way for the office to contact you? OK to leave message on   voicemail  Preferred Call Back Phone Number? [phone]  ---------------------------------------------------------------------------  --------------  SCRIPT ANSWERS  Relationship to Patient? Self  (Patient requests to see provider urgently. )? Yes  Have you been diagnosed with, awaiting test results for, or told that you   are suspected of having COVID-19 (Coronavirus)? (If patient has tested   negative or was tested as a requirement for work, school, or travel and   not based on symptoms, answer no)? No  Within the past 10 days have you developed any of the following symptoms   (answer no if symptoms have been present longer than 10 days or began   more than 10 days ago)? Fever or Chills, Cough, Shortness of breath or   difficulty breathing, Loss of taste or smell, Sore throat, Nasal   congestion, Sneezing or runny nose, Fatigue or generalized body aches   (answer no if pain is specific to a body part e.g. back pain), Diarrhea,   Headache?  Yes

## 2022-02-28 ENCOUNTER — APPOINTMENT (OUTPATIENT)
Dept: PHYSICAL THERAPY | Age: 62
End: 2022-02-28
Payer: MEDICAID

## 2022-03-03 ENCOUNTER — OFFICE VISIT (OUTPATIENT)
Dept: FAMILY MEDICINE CLINIC | Age: 62
End: 2022-03-03
Payer: MEDICAID

## 2022-03-03 VITALS
SYSTOLIC BLOOD PRESSURE: 114 MMHG | HEART RATE: 67 BPM | HEIGHT: 70 IN | OXYGEN SATURATION: 98 % | BODY MASS INDEX: 45.1 KG/M2 | DIASTOLIC BLOOD PRESSURE: 76 MMHG | WEIGHT: 315 LBS | RESPIRATION RATE: 16 BRPM

## 2022-03-03 DIAGNOSIS — R51.9 SUDDEN ONSET OF SEVERE HEADACHE: ICD-10-CM

## 2022-03-03 DIAGNOSIS — R51.9 SUDDEN ONSET OF SEVERE HEADACHE: Primary | ICD-10-CM

## 2022-03-03 LAB
BASOPHILS ABSOLUTE: 0.1 K/UL (ref 0–0.2)
BASOPHILS RELATIVE PERCENT: 1 %
C-REACTIVE PROTEIN: 13.8 MG/L (ref 0–5.1)
EOSINOPHILS ABSOLUTE: 0.2 K/UL (ref 0–0.6)
EOSINOPHILS RELATIVE PERCENT: 1.5 %
HCT VFR BLD CALC: 48.4 % (ref 40.5–52.5)
HEMOGLOBIN: 16.3 G/DL (ref 13.5–17.5)
LYMPHOCYTES ABSOLUTE: 2.2 K/UL (ref 1–5.1)
LYMPHOCYTES RELATIVE PERCENT: 19.7 %
MCH RBC QN AUTO: 32.6 PG (ref 26–34)
MCHC RBC AUTO-ENTMCNC: 33.6 G/DL (ref 31–36)
MCV RBC AUTO: 96.9 FL (ref 80–100)
MONOCYTES ABSOLUTE: 0.8 K/UL (ref 0–1.3)
MONOCYTES RELATIVE PERCENT: 7.4 %
NEUTROPHILS ABSOLUTE: 7.9 K/UL (ref 1.7–7.7)
NEUTROPHILS RELATIVE PERCENT: 70.4 %
PDW BLD-RTO: 12.6 % (ref 12.4–15.4)
PLATELET # BLD: 270 K/UL (ref 135–450)
PMV BLD AUTO: 8.5 FL (ref 5–10.5)
RBC # BLD: 4.99 M/UL (ref 4.2–5.9)
SEDIMENTATION RATE, ERYTHROCYTE: 54 MM/HR (ref 0–20)
WBC # BLD: 11.3 K/UL (ref 4–11)

## 2022-03-03 PROCEDURE — G8483 FLU IMM NO ADMIN DOC REA: HCPCS | Performed by: FAMILY MEDICINE

## 2022-03-03 PROCEDURE — G8417 CALC BMI ABV UP PARAM F/U: HCPCS | Performed by: FAMILY MEDICINE

## 2022-03-03 PROCEDURE — 99214 OFFICE O/P EST MOD 30 MIN: CPT | Performed by: FAMILY MEDICINE

## 2022-03-03 PROCEDURE — 1036F TOBACCO NON-USER: CPT | Performed by: FAMILY MEDICINE

## 2022-03-03 PROCEDURE — 3017F COLORECTAL CA SCREEN DOC REV: CPT | Performed by: FAMILY MEDICINE

## 2022-03-03 PROCEDURE — G8427 DOCREV CUR MEDS BY ELIG CLIN: HCPCS | Performed by: FAMILY MEDICINE

## 2022-03-03 RX ORDER — METHOCARBAMOL 500 MG/1
TABLET, FILM COATED ORAL
COMMUNITY
Start: 2022-02-07

## 2022-03-03 RX ORDER — PROCHLORPERAZINE MALEATE 5 MG/1
5 TABLET ORAL EVERY 6 HOURS PRN
Qty: 120 TABLET | Refills: 3 | Status: SHIPPED | OUTPATIENT
Start: 2022-03-03

## 2022-03-03 NOTE — PROGRESS NOTES
EMERGENCY ROOM FOLLOW-UP  Chief Complaint   Patient presents with    ED Follow-up      Subjective:    Patient here for follow-up from ER on 02/23/2022  Symptoms: pt had a really headache. Had an injection in his back and it all started after that   Diagnosis: headache, CT AND CTA OK, mild dehydration  Treatment: IVF, compazine, benadryl    At present: still having headaches some are really bad. 13 days now    Taking tylenol    Worst headache of life    Currently- sudden onset sharp top of head, assoc N, no V. May last 2-3 h. Happening almost daily, anytime  Worse if move around a lot  Better after a nap- may only last 15-20 minutes  Denies vision change  Keeps hydrated, 1 caffeine a day  Sleeps good with CPAP    Review of Systems   General ROS: fever? No,    Night sweats? No  Respiratory ROS: cough? No   Wheezing? No  Cardiovascular ROS: chest pain? No   Shortness of breath? No  Gastrointestinal ROS: abdominal pain? No   Change in stools? No  Genito-Urinary ROS: painful urination? No   Trouble urinating? No  Neurological ROS: TIA or stroke symptoms? No   Numbness/tingling? No    *Chief complaint, HPI and History provided by the medical assistant has been reviewed and verified by provider Marquita Pascual MD    HISTORY:  Patient's medications, allergies, past medical, and social histories were reviewed and updated as appropriate.      CHART REVIEW  Health Maintenance   Topic Date Due    COVID-19 Vaccine (1) Never done    Colorectal Cancer Screen  03/24/2018    Depression Screen  03/22/2022    Diabetic foot exam  06/14/2022    Diabetic microalbuminuria test  06/14/2022    A1C test (Diabetic or Prediabetic)  02/02/2023    Lipid screen  02/02/2023    TSH testing  02/02/2023    Diabetic retinal exam  02/22/2023    Potassium monitoring  02/23/2023    Creatinine monitoring  02/23/2023    Pneumococcal 0-64 years Vaccine (2 of 2 - PPSV23) 06/20/2023    DTaP/Tdap/Td vaccine (5 - Td or Tdap) 09/29/2025    Shingles Vaccine  Completed    Hepatitis C screen  Completed    HIV screen  Completed    Hepatitis A vaccine  Aged Out    Hib vaccine  Aged Out    Meningococcal (ACWY) vaccine  Aged Out     The 10-year ASCVD risk score (Kristie Benavidez, et al., 2013) is: 16.9%    Values used to calculate the score:      Age: 59 years      Sex: Male      Is Non- : No      Diabetic: Yes      Tobacco smoker: No      Systolic Blood Pressure: 122 mmHg      Is BP treated: Yes      HDL Cholesterol: 43 mg/dL      Total Cholesterol: 149 mg/dL  Prior to Visit Medications    Medication Sig Taking? Authorizing Provider   methocarbamol (ROBAXIN) 500 MG tablet  Yes Historical Provider, MD   prochlorperazine (COMPAZINE) 5 MG tablet Take 1 tablet by mouth every 6 hours as needed for Nausea (headache) Yes Dieter Carrion MD   levothyroxine (SYNTHROID) 75 MCG tablet Take 1 tablet by mouth daily NEW DOSAGE Yes Dieter Carrion MD   metFORMIN (GLUCOPHAGE) 1000 MG tablet Take 2 tablets by mouth daily (with breakfast) Yes Dieter Carrion MD   metoprolol succinate (TOPROL XL) 50 MG extended release tablet Take 1 tablet by mouth daily Yes Dieter Carrion MD   etodolac (LODINE) 400 MG tablet TAKE ONE TABLET BY MOUTH TWICE DAILY AS NEEDED Yes Dieter Carrion MD   benazepril (LOTENSIN) 40 MG tablet TAKE ONE TABLET BY MOUTH DAILY Yes Dieter Carrion MD   amitriptyline (ELAVIL) 50 MG tablet Take 1 tablet by mouth nightly Yes Dieter Carrion MD   pravastatin (PRAVACHOL) 20 MG tablet Take 1 tablet by mouth nightly Yes Dieter Carrion MD   amLODIPine (NORVASC) 5 MG tablet Take 1 tablet by mouth daily Yes Dieter Carrion MD   gabapentin (NEURONTIN) 600 MG tablet Take 600 mg by mouth 3 times daily.  Yes Petros Tao MD   omeprazole (PRILOSEC) 20 MG delayed release capsule Take 1 capsule by mouth every morning (before breakfast) Yes Lauren Shelley MD   ibuprofen (ADVIL;MOTRIN) 800 MG tablet Take 0.5 tablets by mouth every 8 hours as needed for Pain Yes Amber Ruby [employer], APRN - NP   aspirin 81 MG tablet Take 81 mg by mouth daily. Yes Historical Provider, MD   multivitamin [facility]) per tablet Take 1 tablet by mouth daily.    Yes Historical Provider, MD      Family History   Problem Relation Age of Onset    Alcohol Abuse Mother     Diabetes Father     Heart Disease Father     Stroke Father     Kidney Disease Other     Diabetes Sister     Stroke Sister     Coronary Art Dis Sister      Social History     Tobacco Use    Smoking status: Never Smoker    Smokeless tobacco: Never Used    Tobacco comment: Advised to not start   Substance Use Topics    Alcohol use: Not Currently     Alcohol/week: 0.0 standard drinks     Comment: 3 beers/9 weeks    Drug use: No      LAST LABS  Cholesterol, Total   Date Value Ref Range Status   02/02/2022 149 0 - 199 mg/dL Final     LDL Calculated   Date Value Ref Range Status   02/02/2022 67 <100 mg/dL Final     HDL   Date Value Ref Range Status   02/02/2022 43 40 - 60 mg/dL Final   05/21/2012 41 40 - 60 mg/dl Final     Triglycerides   Date Value Ref Range Status   02/02/2022 193 (H) 0 - 150 mg/dL Final     Lab Results   Component Value Date    GLUCOSE 148 02/23/2022     Lab Results   Component Value Date     02/23/2022    K 4.0 02/23/2022    CREATININE 0.8 02/23/2022     Lab Results   Component Value Date    WBC 12.9 (H) 02/23/2022    HGB 16.4 02/23/2022    HCT 47.4 02/23/2022    MCV 94.2 02/23/2022     02/23/2022     Lab Results   Component Value Date    ALT 23 02/23/2022    AST 18 02/23/2022    ALKPHOS 77 02/23/2022    BILITOT 0.5 02/23/2022     TSH (uIU/mL)   Date Value   02/02/2022 4.47 (H)     Lab Results   Component Value Date    LABA1C 6.4 02/02/2022      Objective:    PHYSICAL EXAM   /76 (Site: Right Upper Arm, Position: Sitting, Cuff Size: Large Adult)   Pulse 67   Resp 16   Ht 5' 10\" (1.778 m)   Wt (!) 339 lb (153.8 kg)   SpO2 98%   BMI 48.64 kg/m²   BP Readings from Last 5 Encounters: 03/03/22 114/76   02/23/22 (!) 123/101   02/02/22 112/78   11/02/21 126/70   06/14/21 132/84     Wt Readings from Last 5 Encounters:   03/03/22 (!) 339 lb (153.8 kg)   02/23/22 (!) 341 lb (154.7 kg)   02/02/22 (!) 340 lb (154.2 kg)   11/02/21 (!) 338 lb (153.3 kg)   06/14/21 (!) 339 lb (153.8 kg)      GENERAL:   · well-developed, well-nourished, alert, no distress. EYES:   · External findings: lids and lashes normal and conjunctivae and sclerae normal  · Eyes: no periorbital cellulitis. ENT:   · External nose and ears appear normal  · normal TM's and external ear canals both ears  · Pharynx: normal. Exudates: None  · Lips, mucosa, and tongue normal   · Hearing grossly normal.     LUNGS:    · Breathing unlabored  · clear to auscultation bilaterally and good air movement  CARDIOVASC:   · regular rate and rhythm, S1, S2 normal. No murmurs noted. PSYCH:    · Alert and oriented  · Normal reasoning, insight good  · Facial expressions full, mood appropriate  · No memory disturbance noted  NECK- nl ROM     Assessment and Plan:      Diagnosis Orders   1. Sudden onset of severe headache  prochlorperazine (COMPAZINE) 5 MG tablet    Amb External Referral To Neurology    MRI BRAIN WO CONTRAST   Plan below. Not improving. Almost 2 weeks. Not fit migraine, cluster, trigeminal.  Eyes cleared by optho. CT and CTA nl  Declined LP    INSTRUCTIONS  · NEXT APPOINTMENT: Please schedule check-up in 2 months. · PLEASE GET BLOODWORK DRAWN TODAY ON FIRST FLOOR in 170. RESULTS- most blood tests back in couple days. We will call you if any problems. If bloodwork good, you will get letter in mail or notified thru [facility] (if signed up) within 2 weeks. If you do not, please call office. · Schedule MRI of head to get a different perspective. · Schedule with neurology soon. May need that lumbar puncture if not improving. · Get benadryl at pharmacy. Take compazine with benadryl at onset of headache.

## 2022-03-03 NOTE — PATIENT INSTRUCTIONS
INSTRUCTIONS  · NEXT APPOINTMENT: Please schedule check-up in 2 months. · PLEASE GET BLOODWORK DRAWN TODAY ON FIRST FLOOR in 170. RESULTS- most blood tests back in couple days. We will call you if any problems. If bloodwork good, you will get letter in mail or notified thru [address] (if signed up) within 2 weeks. If you do not, please call office. · Schedule MRI of head to get a different perspective. · Schedule with neurology soon. May need that lumbar puncture if not improving. · Get benadryl at pharmacy. Take compazine with benadryl at onset of headache.

## 2022-03-04 ENCOUNTER — TELEPHONE (OUTPATIENT)
Dept: FAMILY MEDICINE CLINIC | Age: 62
End: 2022-03-04

## 2022-03-04 DIAGNOSIS — F40.240 CLAUSTROPHOBIA: Primary | ICD-10-CM

## 2022-03-04 NOTE — TELEPHONE ENCOUNTER
Patient called in stating he scheduled his MRI however, he is claustrophobic and the  told him to contact his Dr to see if he can get something to take before he goes. He also mentioned that he is supposed to get a neurology test but was confused on what exactly he is supposed to do.     Please Advise

## 2022-03-04 NOTE — TELEPHONE ENCOUNTER
If he gets someone else to drive, will give him a Valium before the mri. Let me know. Hany Borja was trying to get him him with  neurology. If headaches worse, go back to ER.

## 2022-03-07 RX ORDER — DIAZEPAM 10 MG/1
TABLET ORAL
Qty: 1 TABLET | Refills: 0 | Status: SHIPPED | OUTPATIENT
Start: 2022-03-07 | End: 2022-04-06

## 2022-03-07 NOTE — TELEPHONE ENCOUNTER
HE HAS A RIDE TO THE MRI AND BACK  OK TO PRESCRIBE VALIUM - [facility] ON [address]    Wednesday IS mri     ALSO ASKING ABOUT THE REFERRAL FOR [facility]- NEEDS MORE INFORMATION ON THIS- HE THOUGHT KALYAN WAS GOING TO MAKE THE APPT    NO HEADACHE SAT  HAD ONE YESTERDAY AND TOOK ACETAMINOPHEM   AND LAID DOWN- IT DID WORK AFTER 30 MINS

## 2022-03-09 ENCOUNTER — HOSPITAL ENCOUNTER (OUTPATIENT)
Dept: MRI IMAGING | Age: 62
Discharge: HOME | End: 2022-03-09
Payer: MEDICAID

## 2022-03-09 DIAGNOSIS — R51.9 SUDDEN ONSET OF SEVERE HEADACHE: ICD-10-CM

## 2022-03-09 PROCEDURE — 70551 MRI BRAIN STEM W/O DYE: CPT

## 2022-03-10 DIAGNOSIS — J32.9 CHRONIC SINUSITIS, UNSPECIFIED LOCATION: Primary | ICD-10-CM

## 2022-03-10 RX ORDER — AMOXICILLIN AND CLAVULANATE POTASSIUM 875; 125 MG/1; MG/1
1 TABLET, FILM COATED ORAL 2 TIMES DAILY
Qty: 20 TABLET | Refills: 0 | Status: SHIPPED | OUTPATIENT
Start: 2022-03-10 | End: 2022-03-20

## 2022-04-12 DIAGNOSIS — I10 ESSENTIAL HYPERTENSION: ICD-10-CM

## 2022-04-12 RX ORDER — METOPROLOL SUCCINATE 50 MG/1
TABLET, EXTENDED RELEASE ORAL
Qty: 90 TABLET | Refills: 0 | Status: SHIPPED | OUTPATIENT
Start: 2022-04-12 | End: 2022-07-26 | Stop reason: SDUPTHER

## 2022-05-04 ENCOUNTER — TELEPHONE (OUTPATIENT)
Dept: FAMILY MEDICINE CLINIC | Age: 62
End: 2022-05-04

## 2022-05-04 ENCOUNTER — OFFICE VISIT (OUTPATIENT)
Dept: FAMILY MEDICINE CLINIC | Age: 62
End: 2022-05-04
Payer: MEDICAID

## 2022-05-04 VITALS
HEART RATE: 78 BPM | RESPIRATION RATE: 18 BRPM | WEIGHT: 315 LBS | HEIGHT: 70 IN | OXYGEN SATURATION: 96 % | SYSTOLIC BLOOD PRESSURE: 136 MMHG | DIASTOLIC BLOOD PRESSURE: 74 MMHG | BODY MASS INDEX: 45.1 KG/M2

## 2022-05-04 DIAGNOSIS — G47.33 OBSTRUCTIVE SLEEP APNEA SYNDROME: ICD-10-CM

## 2022-05-04 DIAGNOSIS — E78.5 HYPERLIPIDEMIA WITH TARGET LDL LESS THAN 100: ICD-10-CM

## 2022-05-04 DIAGNOSIS — Z12.11 SCREENING FOR COLON CANCER: ICD-10-CM

## 2022-05-04 DIAGNOSIS — Z23 NEED FOR PNEUMOCOCCAL VACCINATION: ICD-10-CM

## 2022-05-04 DIAGNOSIS — I10 ESSENTIAL HYPERTENSION: ICD-10-CM

## 2022-05-04 DIAGNOSIS — M51.36 DDD (DEGENERATIVE DISC DISEASE), LUMBAR: ICD-10-CM

## 2022-05-04 DIAGNOSIS — E03.9 ACQUIRED HYPOTHYROIDISM: ICD-10-CM

## 2022-05-04 DIAGNOSIS — E11.9 CONTROLLED TYPE 2 DIABETES MELLITUS WITHOUT COMPLICATION, WITHOUT LONG-TERM CURRENT USE OF INSULIN (HCC): Primary | ICD-10-CM

## 2022-05-04 LAB
HBA1C MFR BLD: 6.3 %
TSH SERPL DL<=0.05 MIU/L-ACNC: 2.99 UIU/ML (ref 0.27–4.2)

## 2022-05-04 PROCEDURE — 2022F DILAT RTA XM EVC RTNOPTHY: CPT | Performed by: FAMILY MEDICINE

## 2022-05-04 PROCEDURE — 90677 PCV20 VACCINE IM: CPT | Performed by: FAMILY MEDICINE

## 2022-05-04 PROCEDURE — 3017F COLORECTAL CA SCREEN DOC REV: CPT | Performed by: FAMILY MEDICINE

## 2022-05-04 PROCEDURE — 1036F TOBACCO NON-USER: CPT | Performed by: FAMILY MEDICINE

## 2022-05-04 PROCEDURE — G8417 CALC BMI ABV UP PARAM F/U: HCPCS | Performed by: FAMILY MEDICINE

## 2022-05-04 PROCEDURE — 3044F HG A1C LEVEL LT 7.0%: CPT | Performed by: FAMILY MEDICINE

## 2022-05-04 PROCEDURE — G8427 DOCREV CUR MEDS BY ELIG CLIN: HCPCS | Performed by: FAMILY MEDICINE

## 2022-05-04 PROCEDURE — 99214 OFFICE O/P EST MOD 30 MIN: CPT | Performed by: FAMILY MEDICINE

## 2022-05-04 PROCEDURE — 83036 HEMOGLOBIN GLYCOSYLATED A1C: CPT | Performed by: FAMILY MEDICINE

## 2022-05-04 RX ORDER — CELECOXIB 200 MG/1
200 CAPSULE ORAL DAILY
Qty: 30 CAPSULE | Refills: 5 | Status: SHIPPED | OUTPATIENT
Start: 2022-05-04

## 2022-05-04 ASSESSMENT — PATIENT HEALTH QUESTIONNAIRE - PHQ9
SUM OF ALL RESPONSES TO PHQ QUESTIONS 1-9: 0
1. LITTLE INTEREST OR PLEASURE IN DOING THINGS: 0
SUM OF ALL RESPONSES TO PHQ9 QUESTIONS 1 & 2: 0
SUM OF ALL RESPONSES TO PHQ QUESTIONS 1-9: 0
2. FEELING DOWN, DEPRESSED OR HOPELESS: 0
SUM OF ALL RESPONSES TO PHQ QUESTIONS 1-9: 0
SUM OF ALL RESPONSES TO PHQ QUESTIONS 1-9: 0

## 2022-05-04 NOTE — TELEPHONE ENCOUNTER
Had allergic reaction to a flu shot in past. Needs to get first COVID vaccine in a medical center not a pharmacy. What are the options?

## 2022-05-04 NOTE — PROGRESS NOTES
CHRONIC CONDITION FOLOW-UP     Assessment and Plan:      Diagnosis Orders   1. Controlled type 2 diabetes mellitus without complication, without long-term current use of insulin (HCC)  Stable with current medications. No adjustments needed. Will continue to monitor. 2. Essential hypertension  Stable with current medications. No adjustments needed. Will continue to monitor. 3. Hyperlipidemia with target LDL less than 100  Stable with current medications. No adjustments needed. Will continue to monitor. 4. Obstructive sleep apnea syndrome  stable   5. Acquired hypothyroidism  TSH- check on increased dosage   6. DDD (degenerative disc disease), lumbar  Pain worse, change NSAID to COX2. On robaxin and gabapentin per pain. 7. Need for pneumococcal vaccination  Pneumococcal Conjugate PCV20, PF (Prevnar 20)    celecoxib (CELEBREX) 200 MG capsule   8. Screening for colon cancer  POCT Fecal Immunochemical Test (FIT)      Continue current Tx plan. Any changes marked below. INSTRUCTIONS  NEXT APPOINTMENT: Please schedule fasting annual physical (30 minutes) in 6 months. OK to have water, black coffee and medications (except for diabetes medicines)  ·  Dr. Jackson and Analilia Khan (Nurse Practitioner) are sharing their practices as a team.  This will allow increased access to care. Office visits may alternate between these providers while we continue to maintain high quality of care. · PLEASE TAKE THIS FORM TO CHECK-OUT WINDOW TO SCHEDULE NEXT VISIT.   · A1c good at 6.3 today. · Ask pain doctor about increasing gabapentin. · Stop etodolac. START celebrex. · Please do stool dip screen and return/mail back to office for colon cancer screening. Subjective:      Chief Complaint   Patient presents with    Diabetes     Pt is here for a diabetic follow up. Naye Alannah is an 59 y.o. male who presents for follow up    Complaints:    Back in back with radicular to both legs.  Planning another epidural. On gabapentin and robaxin per pain.  NO OTC ibuprofen or naproxen. OK to take tylenol arthritis. CHART REVIEW   reports that he has never smoked. He has never used smokeless tobacco.  Health Maintenance Due   Topic Date Due    COVID-19 Vaccine (1) Never done    Pneumococcal 0-64 years Vaccine (2 - PCV) 03/17/2018    Colorectal Cancer Screen  03/24/2018     Current Outpatient Medications   Medication Instructions    amitriptyline (ELAVIL) 50 mg, Oral, NIGHTLY    amLODIPine (NORVASC) 5 mg, Oral, DAILY    aspirin 81 mg, Oral, DAILY    benazepril (LOTENSIN) 40 MG tablet TAKE ONE TABLET BY MOUTH DAILY    celecoxib (CELEBREX) 200 mg, Oral, DAILY    gabapentin (NEURONTIN) 600 mg, Oral, 3 TIMES DAILY    ibuprofen (ADVIL;MOTRIN) 400 mg, Oral, EVERY 8 HOURS PRN    levothyroxine (SYNTHROID) 75 mcg, Oral, DAILY, NEW DOSAGE    metFORMIN (GLUCOPHAGE) 2,000 mg, Oral, DAILY WITH BREAKFAST    methocarbamol (ROBAXIN) 500 MG tablet No dose, route, or frequency recorded.     metoprolol succinate (TOPROL XL) 50 MG extended release tablet TAKE ONE TABLET BY MOUTH DAILY    multivitamin (THERAGRAN) per tablet 1 tablet, Oral, DAILY    omeprazole (PRILOSEC) 20 mg, Oral, DAILY BEFORE BREAKFAST    pravastatin (PRAVACHOL) 20 mg, Oral, NIGHTLY    prochlorperazine (COMPAZINE) 5 mg, Oral, EVERY 6 HOURS PRN     LAST LABS  Lab Results   Component Value Date    LDLCALC 67 02/02/2022     Lab Results   Component Value Date    HDL 43 02/02/2022     Lab Results   Component Value Date    TRIG 193 (H) 02/02/2022     Lab Results   Component Value Date     02/23/2022    K 4.0 02/23/2022    CREATININE 0.8 02/23/2022     Lab Results   Component Value Date    WBC 11.3 (H) 03/03/2022    HGB 16.3 03/03/2022     03/03/2022     Lab Results   Component Value Date    ALT 23 02/23/2022    AST 18 02/23/2022    ALKPHOS 77 02/23/2022    BILITOT 0.5 02/23/2022     TSH (uIU/mL)   Date Value   02/02/2022 4.47 (H)     Lab Results Component Value Date    GLUCOSE 148 02/23/2022     Lab Results   Component Value Date    LABA1C 6.4 02/02/2022    LABA1C 6.4 02/02/2022    LABA1C 6.4 11/02/2021       Objective:   PHYSICAL EXAM   /74 (Site: Right Upper Arm, Position: Sitting, Cuff Size: Large Adult)   Pulse 78   Resp 18   Ht 5' 10\" (1.778 m)   Wt (!) 335 lb (152 kg)   SpO2 96%   BMI 48.07 kg/m²   BP Readings from Last 5 Encounters:   05/04/22 136/74   03/03/22 114/76   02/23/22 (!) 123/101   02/02/22 112/78   11/02/21 126/70     Wt Readings from Last 5 Encounters:   05/04/22 (!) 335 lb (152 kg)   03/03/22 (!) 339 lb (153.8 kg)   02/23/22 (!) 341 lb (154.7 kg)   02/02/22 (!) 340 lb (154.2 kg)   11/02/21 (!) 338 lb (153.3 kg)      GENERAL:   · well-developed, well-nourished, alert, no distress.      LUNGS:    · Breathing unlabored  · clear to auscultation bilaterally and good air movement  CARDIOVASC:   · regular rate and rhythm  SKIN: warm and dry

## 2022-05-06 NOTE — TELEPHONE ENCOUNTER
Called a few placed to see where this could happen   [facility] does them Monday - Friday 10-5. Pt does not need to bring anything expect a photo ID   They are fully staffed with nurses and have epi pens on site incase of any reaction   Nurse I spoke with said they watch pt for 30 minutes after the shot to look for a reaction   Called pt to ask what type of reaction pt had to the flu shot   Pt said that he has trouble breathing and arm swelling, throat swelling, and tongue swelling   Would you like to give pt anything before getting the shot to see if they may help lower the reaction? Pt also got the Prever  shot when here, asked pt of any reaction.  Pt stated no reaction to that shot   Please advise  Thank you

## 2022-05-09 ENCOUNTER — TELEPHONE (OUTPATIENT)
Dept: FAMILY MEDICINE CLINIC | Age: 62
End: 2022-05-09

## 2022-05-09 NOTE — TELEPHONE ENCOUNTER
Called pt   Pt said he will go get the Covid shot and will call us if any reactions takes place to keep us updated   Pt said he will try the medication before the shot to see if that helps   Pt asked about test results   Read  message from results   Thank you

## 2022-05-09 NOTE — TELEPHONE ENCOUNTER
Can take benadryl 25 mg before if it does not make him too sleepy to drive. Alternatively, take 2 OTC Claritin/Allovert/loratidine or Zyrtec/certrizine 10 mg a couple hours before shot.

## 2022-05-23 DIAGNOSIS — E11.9 CONTROLLED TYPE 2 DIABETES MELLITUS WITHOUT COMPLICATION, WITHOUT LONG-TERM CURRENT USE OF INSULIN (HCC): ICD-10-CM

## 2022-05-25 ENCOUNTER — APPOINTMENT (OUTPATIENT)
Dept: GENERAL RADIOLOGY | Age: 62
End: 2022-05-25
Payer: MEDICAID

## 2022-05-25 ENCOUNTER — HOSPITAL ENCOUNTER (EMERGENCY)
Age: 62
Discharge: HOME | End: 2022-05-25
Attending: EMERGENCY MEDICINE
Payer: MEDICAID

## 2022-05-25 ENCOUNTER — APPOINTMENT (OUTPATIENT)
Dept: CT IMAGING | Age: 62
End: 2022-05-25
Payer: MEDICAID

## 2022-05-25 VITALS
HEART RATE: 80 BPM | TEMPERATURE: 98.9 F | SYSTOLIC BLOOD PRESSURE: 145 MMHG | RESPIRATION RATE: 20 BRPM | WEIGHT: 315 LBS | OXYGEN SATURATION: 98 % | HEIGHT: 70 IN | DIASTOLIC BLOOD PRESSURE: 79 MMHG | BODY MASS INDEX: 45.1 KG/M2

## 2022-05-25 DIAGNOSIS — S76.311A STRAIN OF RIGHT HAMSTRING MUSCLE, INITIAL ENCOUNTER: ICD-10-CM
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
## 2022-05-25 DIAGNOSIS — S20.211A RIB CONTUSION, RIGHT, INITIAL ENCOUNTER: ICD-10-CM

## 2022-05-25 DIAGNOSIS — S50.01XA CONTUSION OF RIGHT ELBOW, INITIAL ENCOUNTER: ICD-10-CM

## 2022-05-25 DIAGNOSIS — S49.91XA INJURY OF RIGHT SHOULDER, INITIAL ENCOUNTER: ICD-10-CM

## 2022-05-25 PROCEDURE — 73080 X-RAY EXAM OF ELBOW: CPT

## 2022-05-25 PROCEDURE — 99284 EMERGENCY DEPT VISIT MOD MDM: CPT

## 2022-05-25 PROCEDURE — 73030 X-RAY EXAM OF SHOULDER: CPT

## 2022-05-25 PROCEDURE — 71250 CT THORAX DX C-: CPT

## 2022-05-25 RX ORDER — HYDROCODONE BITARTRATE AND ACETAMINOPHEN 5; 325 MG/1; MG/1
1 TABLET ORAL EVERY 6 HOURS PRN
Qty: 12 TABLET | Refills: 0 | Status: SHIPPED | OUTPATIENT
Start: 2022-05-25 | End: 2022-05-28

## 2022-05-25 ASSESSMENT — PAIN DESCRIPTION - LOCATION
LOCATION: ABDOMEN;SHOULDER;LEG
LOCATION: ABDOMEN;SHOULDER;LEG

## 2022-05-25 ASSESSMENT — PAIN DESCRIPTION - ORIENTATION
ORIENTATION: RIGHT
ORIENTATION: RIGHT

## 2022-05-25 ASSESSMENT — PAIN SCALES - GENERAL
PAINLEVEL_OUTOF10: 8
PAINLEVEL_OUTOF10: 7

## 2022-05-25 ASSESSMENT — PAIN DESCRIPTION - DESCRIPTORS
DESCRIPTORS: ACHING
DESCRIPTORS: ACHING

## 2022-05-25 ASSESSMENT — PAIN - FUNCTIONAL ASSESSMENT: PAIN_FUNCTIONAL_ASSESSMENT: 0-10

## 2022-05-25 NOTE — ED PROVIDER NOTES
RADHA Geller Rkp. 18. (pt states he slipped on wet steps traking out trash on Monday morning, c/o R rib, leg and shoulder pain)       HISTORY OF PRESENT ILLNESS  Navya Reyes is a 59 y.o. male  who presents to the ED complaining of right sided rib pain, shoulder and elbow pain on the right as well as of right posterior leg pain s/p fall. On Monday he was walking down and slipped on concrete steps falling to the ground. He did not feel lightheaded all and did not hit his head or have loss of consciousness. Patient states that he has back issues at baseline but denies any injury to his back and denies any neck pain. He states that it feels like he may have injured his hamstring as it feels like there is a \"wire\" pulling in his posterior leg but his primary complaint is pain in the right anterior lateral rib cage area inferiorly as well as pain in the right elbow and right shoulder. Is worse with movement or walking. Worse also with lying down. Patient has some nausea with it no significant shortness of breath. He has tried Tylenol without much relief. He previously had been on aspirin daily but is planned to have an epidural injection into his back soon so has discontinued his aspirin over the past week. He is not on any other anticoagulation. He has been able to ambulate since this event. No other complaints, modifying factors or associated symptoms. I have reviewed the following from the nursing documentation.     Past Medical History:   Diagnosis Date    Acquired hypothyroidism 6/14/2021    Arthritis     High blood pressure     History of influenza vaccine allergy 11/17/2017    HTN (hypertension)     Hyperlipidemia LDL goal < 100     Insomnia     Lumbosacral radiculopathy-L5 by EMG     epidural spring 6111    Metabolic syndrome X     Nephrolithiasis 7/3/2013    Obesity     Pneumonia 2008    Tinnitus 12/20/2012     Past Surgical History:   Procedure Friends and Family: Not on file    Attends Religion Services: Not on file    Active Member of Clubs or Organizations: Not on file    Attends Club or Organization Meetings: Not on file    Marital Status: Not on file   Intimate Partner Violence:     Fear of Current or Ex-Partner: Not on file    Emotionally Abused: Not on file    Physically Abused: Not on file    Sexually Abused: Not on file   Housing Stability:     Unable to Pay for Housing in the Last Year: Not on file    Number of [place] in the Last Year: Not on file    Unstable Housing in the Last Year: Not on file     No current facility-administered medications for this encounter. Current Outpatient Medications   Medication Sig Dispense Refill    HYDROcodone-acetaminophen (NORCO) 5-325 MG per tablet Take 1 tablet by mouth every 6 hours as needed for Pain for up to 3 days. Intended supply: 3 days. Take lowest dose possible to manage pain 12 tablet 0    metFORMIN (GLUCOPHAGE) 1000 MG tablet TAKE TWO TABLETS BY MOUTH DAILY WITH BREAKFAST 180 tablet 1    celecoxib (CELEBREX) 200 MG capsule Take 1 capsule by mouth daily 30 capsule 5    metoprolol succinate (TOPROL XL) 50 MG extended release tablet TAKE ONE TABLET BY MOUTH DAILY 90 tablet 0    methocarbamol (ROBAXIN) 500 MG tablet       prochlorperazine (COMPAZINE) 5 MG tablet Take 1 tablet by mouth every 6 hours as needed for Nausea (headache) 120 tablet 3    levothyroxine (SYNTHROID) 75 MCG tablet Take 1 tablet by mouth daily NEW DOSAGE 90 tablet 1    benazepril (LOTENSIN) 40 MG tablet TAKE ONE TABLET BY MOUTH DAILY 90 tablet 1    amitriptyline (ELAVIL) 50 MG tablet Take 1 tablet by mouth nightly 90 tablet 1    pravastatin (PRAVACHOL) 20 MG tablet Take 1 tablet by mouth nightly 90 tablet 1    amLODIPine (NORVASC) 5 MG tablet Take 1 tablet by mouth daily 90 tablet 1    gabapentin (NEURONTIN) 600 MG tablet Take 600 mg by mouth 3 times daily.       omeprazole (PRILOSEC) 20 MG delayed release capsule Take 1 capsule by mouth every morning (before breakfast) 30 capsule 0    ibuprofen (ADVIL;MOTRIN) 800 MG tablet Take 0.5 tablets by mouth every 8 hours as needed for Pain 120 tablet 0    aspirin 81 MG tablet Take 81 mg by mouth daily.  multivitamin (THERAGRAN) per tablet Take 1 tablet by mouth daily. Allergies   Allergen Reactions    Influenza Vaccines Hives     IMMEDIATE REACTION    Other Hives    Codeine Nausea And Vomiting and Rash       REVIEW OF SYSTEMS  10 systems reviewed, pertinent positives per HPI otherwise noted to be negative. PHYSICAL EXAM  BP (!) 145/79   Pulse 80   Temp 98.9 °F (37.2 °C) (Oral)   Resp 20   Ht 5' 10\" (1.778 m)   Wt (!) 331 lb (150.1 kg)   SpO2 98%   BMI 47.49 kg/m²    GENERAL APPEARANCE: Awake and alert. Cooperative. No acute distress. Obese. HENT: Normocephalic. Atraumatic. Mucous membranes are moist.  No drooling or stridor. NECK: Supple. EYES: PERRL. EOM's grossly intact. HEART/CHEST: RRR. No murmurs. Patient with chest wall tenderness to the anterior lateral aspect of the inferior right rib cage without any bony step-offs or crepitus. No soft tissue swelling or ecchymoses appreciated. 2+ radial pulses bilaterally. LUNGS: Respirations unlabored. CTAB. Good air exchange. Speaking comfortably in full sentences. ABDOMEN: No tenderness. Soft. Non-distended. No masses. No organomegaly. No guarding or rebound. MUSCULOSKELETAL: No extremity edema. Compartments soft. No deformity. Patient with diffuse tenderness to the posterior lateral aspect of the right elbow but patient able to fully flex and extend although with pain. He is able to supinate and pronate. No other distal tenderness in the right upper extremity. There is also diffuse tenderness to the anterior aspect of the shoulder especially at the [facility] joint with pain with abduction.   Patient also with tenderness diffusely to the right hamstring but able to flex and extend. .  All extremities neurovascularly intact. SKIN: Warm and dry. No acute rashes. NEUROLOGICAL: Alert and oriented. CN's 2-12 intact. No gross facial drooping. PSYCHIATRIC: Normal mood and affect. LABS  I have reviewed all labs for this visit. No results found for this visit on 05/25/22. RADIOLOGY  XR SHOULDER RIGHT (MIN 2 VIEWS)    Result Date: 5/25/2022  EXAMINATION: THREE XRAY VIEWS OF THE RIGHT SHOULDER 5/25/2022 8:58 am COMPARISON: None. HISTORY: ORDERING SYSTEM PROVIDED HISTORY: fall pain with abduction TECHNOLOGIST PROVIDED HISTORY: Reason for exam:->fall pain with abduction Reason for Exam: Fall, shoulder pain FINDINGS: There is no fracture or dislocation. Mild-to-moderate degenerative joint space narrowing is present within the acromioclavicular and glenohumeral regions. The soft tissues are unremarkable. Visualized lung is unremarkable. Degenerative changes without acute abnormality. XR ELBOW RIGHT (MIN 3 VIEWS)    Result Date: 5/25/2022  EXAMINATION: THREE XRAY VIEWS OF THE RIGHT ELBOW 5/25/2022 8:58 am COMPARISON: None. HISTORY: ORDERING SYSTEM PROVIDED HISTORY: fall, pain posterior lateral elbow TECHNOLOGIST PROVIDED HISTORY: Reason for exam:->fall, pain posterior lateral elbow Reason for Exam: Fall, elbow pain FINDINGS: Advanced degenerative change of the right elbow. Prominent spurring at the distal humerus as well as at the proximal radius and ulna. No acute fracture or dislocation. No joint effusion or significant soft tissue swelling. Advanced degenerative change of the right elbow with no acute abnormality relating to recent injury. CT CHEST WO CONTRAST    Result Date: 5/25/2022  EXAMINATION: CT OF THE CHEST WITHOUT CONTRAST 5/25/2022 8:58 am TECHNIQUE: CT of the chest was performed without the administration of intravenous contrast. Multiplanar reformatted images are provided for review.  Automated exposure control, iterative reconstruction, and/or weight based adjustment of the mA/kV was utilized to reduce the radiation dose to as low as reasonably achievable. COMPARISON: None. HISTORY: ORDERING SYSTEM PROVIDED HISTORY: right rib pain s/p fall, chest wall trauma TECHNOLOGIST PROVIDED HISTORY: Reason for exam:->right rib pain s/p fall, chest wall trauma Decision Support Exception - unselect if not a suspected or confirmed emergency medical condition->Emergency Medical Condition (MA) Reason for Exam: Fall, right side rib pain FINDINGS: THYROID: The visualized thyroid gland is unremarkable. CARDIOVASCULAR: The heart is normal in size. There is no pericardial effusion. The aorta is normal in course and caliber. The main pulmonary artery is normal in caliber. LUNGS/PLEURA: There are no focal consolidations or pleural effusion. There is no pneumothorax. The trachea and central bronchi are patent. THORACIC NODES: There are no pathologically enlarged mediastinal, hilar or axillary lymph nodes. UPPER ABDOMEN: Limited images of the upper abdomen demonstrate no acute abnormality. There is diffuse fatty infiltration of the liver. The gallbladder is surgically absent. BONES/SOFT: There is no acute osseous or soft tissue abnormality. No CT evidence of acute pulmonary disease. No CT evidence of acute displaced rib fracture. ED COURSE/MDM  Patient seen and evaluated. Old records reviewed. Imaging reviewed and results discussed with patient. Patient presenting status post mechanical fall. I suspect likely strain of his hamstring muscle. He does have chronic back pain which she is following up closely for, but no new back injury or significant exacerbation of his back pain. No neck or head injury. He was complaining mostly of right-sided rib pain and CT scan showed no displaced rib fractures or other traumatic injury noted. He also has pain to the right shoulder and I suspect possible rotator cuff injury given pain especially with abduction.   Patient also with right elbow injury that is likely contusion. Will treat supportively and also refer to physical therapy and orthopedics for close follow-up. Patient is in agreement that plan. Reasons to return to the ER were discussed and all questions were answered at time of discharge. I estimate there is LOW risk for COMPARTMENT SYNDROME, DEEP VENOUS THROMBOSIS, SEPTIC ARTHRITIS, TENDON OR NEUROVASCULAR INJURY, thus I consider the discharge disposition reasonable. Norm Reyes and I have discussed the diagnosis and risks, and we agree with discharging home to follow-up with their primary doctor or the referral orthopedist. We also discussed returning to the Emergency Department immediately if new or worsening symptoms occur. We have discussed the symptoms which are most concerning (e.g., changing or worsening pain, numbness, weakness) that necessitate immediate return. Is this patient to be included in the SEP-1 Core Measure? No   Exclusion criteria - the patient is NOT to be included for SEP-1 Core Measure due to: Infection is not suspected     During the patient's ED course, the patient was given:  Medications - No data to display     CLINICAL IMPRESSION  1. Fall, initial encounter    2. Rib contusion, right, initial encounter    3. Injury of right shoulder, initial encounter    4. Contusion of right elbow, initial encounter    5. Strain of right hamstring muscle, initial encounter        Blood pressure (!) 145/79, pulse 80, temperature 98.9 °F (37.2 °C), temperature source Oral, resp. rate 20, height 5' 10\" (1.778 m), weight (!) 331 lb (150.1 kg), SpO2 98 %. DISPOSITION  Norm Reyes was discharged to home in stable condition. Patient was given scripts for the following medications. I counseled patient how to take these medications.    Discharge Medication List as of 5/25/2022  9:52 AM      START taking these medications    Details   HYDROcodone-acetaminophen (NORCO) 5-325 MG per tablet Take 1 tablet by mouth every 6 hours as needed for Pain for up to 3 days. Intended supply: 3 days. Take lowest dose possible to manage pain, Disp-12 tablet, R-0Normal             Follow-up with:  David Murcia MD  [address] [facility]  [address] [place] [address]  [phone]    Schedule an appointment as soon as possible for a visit in 2 days  For recheck    [place]  [address] [address]  Schedule an appointment as soon as possible for a visit       [facility] Bruce [facility] [address] [facility] [address]  [phone]    Schedule an appointment as soon as possible for a visit   to follow up with orthopedics about your shoulder injury, which may be a rotator cuff injury      DISCLAIMER: This chart was created using Dragon dictation software. Efforts were made by me to ensure accuracy, however some errors may be present due to limitations of this technology and occasionally words are not transcribed correctly.         Brien Soria MD  05/25/22 1007

## 2022-06-07 DIAGNOSIS — E78.5 HYPERLIPIDEMIA WITH TARGET LDL LESS THAN 100: ICD-10-CM

## 2022-06-08 RX ORDER — PRAVASTATIN SODIUM 20 MG
TABLET ORAL
Qty: 90 TABLET | Refills: 1 | Status: SHIPPED | OUTPATIENT
Start: 2022-06-08

## 2022-06-09 ENCOUNTER — HOSPITAL ENCOUNTER (EMERGENCY)
Age: 62
Discharge: HOME | End: 2022-06-09
Attending: EMERGENCY MEDICINE
Payer: MEDICAID

## 2022-06-09 VITALS
SYSTOLIC BLOOD PRESSURE: 138 MMHG | DIASTOLIC BLOOD PRESSURE: 83 MMHG | BODY MASS INDEX: 45.1 KG/M2 | HEIGHT: 70 IN | HEART RATE: 82 BPM | TEMPERATURE: 98.1 F | OXYGEN SATURATION: 93 % | WEIGHT: 315 LBS | RESPIRATION RATE: 18 BRPM

## 2022-06-09 DIAGNOSIS — M54.31 SCIATICA OF RIGHT SIDE: Primary | ICD-10-CM

## 2022-06-09 PROCEDURE — 99283 EMERGENCY DEPT VISIT LOW MDM: CPT

## 2022-06-09 RX ORDER — OXYCODONE HYDROCHLORIDE AND ACETAMINOPHEN 5; 325 MG/1; MG/1
1 TABLET ORAL EVERY 6 HOURS PRN
Qty: 10 TABLET | Refills: 0 | Status: SHIPPED | OUTPATIENT
Start: 2022-06-09 | End: 2022-06-12

## 2022-06-09 RX ORDER — ONDANSETRON HYDROCHLORIDE 8 MG/1
8 TABLET, FILM COATED ORAL EVERY 8 HOURS PRN
Qty: 10 TABLET | Refills: 0 | Status: SHIPPED | OUTPATIENT
Start: 2022-06-09

## 2022-06-09 RX ORDER — METHOCARBAMOL 500 MG/1
500 TABLET, FILM COATED ORAL 4 TIMES DAILY
Qty: 20 TABLET | Refills: 0 | Status: SHIPPED | OUTPATIENT
Start: 2022-06-09 | End: 2022-06-14

## 2022-06-09 RX ORDER — LIDOCAINE 50 MG/G
1 PATCH TOPICAL EVERY 24 HOURS
Qty: 15 PATCH | Refills: 0 | Status: SHIPPED | OUTPATIENT
Start: 2022-06-09 | End: 2022-06-24

## 2022-06-09 RX ORDER — PREDNISONE 10 MG/1
TABLET ORAL
Qty: 30 TABLET | Refills: 0 | Status: SHIPPED | OUTPATIENT
Start: 2022-06-09 | End: 2022-06-21

## 2022-06-09 ASSESSMENT — ENCOUNTER SYMPTOMS
ABDOMINAL PAIN: 0
BACK PAIN: 1
SHORTNESS OF BREATH: 0

## 2022-06-09 ASSESSMENT — PAIN DESCRIPTION - ORIENTATION: ORIENTATION: LOWER

## 2022-06-09 ASSESSMENT — PAIN DESCRIPTION - LOCATION: LOCATION: BACK

## 2022-06-09 ASSESSMENT — PAIN SCALES - GENERAL: PAINLEVEL_OUTOF10: 9

## 2022-06-09 ASSESSMENT — PAIN - FUNCTIONAL ASSESSMENT: PAIN_FUNCTIONAL_ASSESSMENT: 0-10

## 2022-06-09 NOTE — ED NOTES
Discharge instructions reviewed without questions. No further needs voiced at this time. Ambulatory from department in stable condition.        Kadeem Moody RN  06/09/22 3841

## 2022-06-09 NOTE — ED PROVIDER NOTES
[facility]      Pt Name: Maile Falk  MRN: [id number]  [place] 1960  Date of evaluation: 6/9/2022  Provider: Ashkan Henriquez MD    [address] [place] [address]       Chief Complaint   Patient presents with    Back Pain     worse since Tuesday, no obvious injuries. history of nerve problems in low back. HISTORY OF PRESENT ILLNESS   (Location/Symptom, Timing/Onset, Context/Setting, Quality, Duration, Modifying Factors, Severity)  Note limiting factors. Maile Falk is a 59 y.o. male who presents to the emergency department     Patient is extremely obese he says that he has had some chronic recurrent pain over the years he has had imaging before. He apparently had a flareup after twisting his back. He says it does go down his left buttock cheek up. Patient denies any other symptomatology denies abdominal pain denies dysuria frequency denies gross hematuria denies any rashes pain is very compatible with sciatica which he also endorses patient states that he has never been on prednisone before. He has had epidural injections. Patient denies any other symptoms he apparently drove himself here and does not have anybody to pick him up I will give him a few pain pills he is to notify his pain management doctors of this game plan since he says he does not have anything at home    The history is provided by the patient. Nursing Notes were reviewed. REVIEW OF SYSTEMS    (2-9 systems for level 4, 10 or more for level 5)     Review of Systems   Constitutional: Positive for activity change. Negative for chills and fever. HENT: Negative for congestion. Respiratory: Negative for shortness of breath. Cardiovascular: Negative for chest pain. Gastrointestinal: Negative for abdominal pain. Musculoskeletal: Positive for back pain. Negative for neck pain. Allergic/Immunologic: Negative for immunocompromised state.    Neurological: Negative for dizziness, light-headedness and headaches. Psychiatric/Behavioral: Negative for behavioral problems. All other systems reviewed and are negative. Except as noted above the remainder of the review of systems was reviewed and negative. PAST MEDICAL HISTORY     Past Medical History:   Diagnosis Date    Acquired hypothyroidism 6/14/2021    Arthritis     High blood pressure     History of influenza vaccine allergy 11/17/2017    HTN (hypertension)     Hyperlipidemia LDL goal < 100     Insomnia     Lumbosacral radiculopathy-L5 by EMG     epidural spring 0764    Metabolic syndrome X     Nephrolithiasis 7/3/2013    Obesity     Pneumonia 2008    Tinnitus 12/20/2012         SURGICAL HISTORY       Past Surgical History:   Procedure Laterality Date    CHOLECYSTECTOMY  03/2005    lap    LITHOTRIPSY Left 3/16/2021    CYSTOSCOPY RETROGRADE PYELOGRAM, LEFT URETEROSCOPY, LEFT  LASER LITHOTRIPSY, STENT INSERTION performed by Lady Tony MD at [address] [place] [address]       Previous Medications    AMITRIPTYLINE (ELAVIL) 50 MG TABLET    Take 1 tablet by mouth nightly    AMLODIPINE (NORVASC) 5 MG TABLET    Take 1 tablet by mouth daily    ASPIRIN 81 MG TABLET    Take 81 mg by mouth daily. BENAZEPRIL (LOTENSIN) 40 MG TABLET    TAKE ONE TABLET BY MOUTH DAILY    CELECOXIB (CELEBREX) 200 MG CAPSULE    Take 1 capsule by mouth daily    GABAPENTIN (NEURONTIN) 600 MG TABLET    Take 600 mg by mouth 3 times daily. IBUPROFEN (ADVIL;MOTRIN) 800 MG TABLET    Take 0.5 tablets by mouth every 8 hours as needed for Pain    LEVOTHYROXINE (SYNTHROID) 75 MCG TABLET    Take 1 tablet by mouth daily NEW DOSAGE    METFORMIN (GLUCOPHAGE) 1000 MG TABLET    TAKE TWO TABLETS BY MOUTH DAILY WITH BREAKFAST    METHOCARBAMOL (ROBAXIN) 500 MG TABLET        METOPROLOL SUCCINATE (TOPROL XL) 50 MG EXTENDED RELEASE TABLET    TAKE ONE TABLET BY MOUTH DAILY    MULTIVITAMIN (THERAGRAN) PER TABLET    Take 1 tablet by mouth daily. Not on file   Social Connections:     Frequency of Communication with Friends and Family: Not on file    Frequency of Social Gatherings with Friends and Family: Not on file    Attends Adventism Services: Not on file    Active Member of Clubs or Organizations: Not on file    Attends Club or Organization Meetings: Not on file    Marital Status: Not on file   Intimate Partner Violence:     Fear of Current or Ex-Partner: Not on file    Emotionally Abused: Not on file    Physically Abused: Not on file    Sexually Abused: Not on file   Housing Stability:     Unable to Pay for Housing in the Last Year: Not on file    Number of [place] in the Last Year: Not on file    Unstable Housing in the Last Year: Not on file       SCREENINGS    Love Coma Scale  Eye Opening: Spontaneous  Best Verbal Response: Oriented  Best Motor Response: Obeys commands  [place] Coma Scale Score: 15          PHYSICAL EXAM    (up to 7 for level 4, 8 or more for level 5)     ED Triage Vitals   BP Temp Temp Source Heart Rate Resp SpO2 Height Weight   06/09/22 1156 06/09/22 1138 06/09/22 1138 06/09/22 1138 06/09/22 1138 06/09/22 1138 06/09/22 1138 06/09/22 1138   138/83 98.1 °F (36.7 °C) Oral 82 18 93 % 5' 10\" (1.778 m) (!) 335 lb (152 kg)       Physical Exam  Vitals and nursing note reviewed. Constitutional:       General: He is not in acute distress. Appearance: He is well-developed. He is obese. He is ill-appearing. He is not diaphoretic. HENT:      Head: Normocephalic and atraumatic. Eyes:      Conjunctiva/sclera: Conjunctivae normal.      Pupils: Pupils are equal, round, and reactive to light. Neck:      Thyroid: No thyromegaly. Cardiovascular:      Rate and Rhythm: Normal rate and regular rhythm. Heart sounds: Normal heart sounds. No murmur heard. No friction rub. No gallop. Pulmonary:      Effort: Pulmonary effort is normal. No respiratory distress. Breath sounds: Normal breath sounds.    Abdominal: General: Bowel sounds are normal. There is no distension. Palpations: Abdomen is soft. Tenderness: There is no abdominal tenderness. Musculoskeletal:         General: Tenderness and signs of injury present. No swelling. Cervical back: Normal range of motion and neck supple. Lumbar back: Tenderness present. No bony tenderness. Decreased range of motion. Positive left straight leg raise test.        Back:    Neurological:      Mental Status: He is alert and oriented to person, place, and time. GCS: GCS eye subscore is 4. GCS verbal subscore is 5. GCS motor subscore is 6. Cranial Nerves: No cranial nerve deficit. Sensory: No sensory deficit. Motor: No abnormal muscle tone. Coordination: Coordination normal.      Deep Tendon Reflexes: Reflexes normal.   Psychiatric:         Behavior: Behavior normal.         DIAGNOSTIC RESULTS     EKG: All EKG's are interpreted by the Emergency Department Physician who either signs or Co-signs this chart in the absence of a cardiologist.        RADIOLOGY:   Non-plain film images such as CT, Ultrasound and MRI are read by the radiologist. Plain radiographic images are visualized and preliminarily interpreted by the emergency physician with the below findings:        Interpretation per the Radiologist below, if available at the time of this note:    No orders to display           LABS:  No results found for this visit on 06/09/22. EMERGENCY DEPARTMENT COURSE and DIFFERENTIAL DIAGNOSIS/MDM:     Vitals:    06/09/22 1138 06/09/22 1156   BP:  138/83   Pulse: 82    Resp: 18    Temp: 98.1 °F (36.7 °C)    TempSrc: Oral    SpO2: 93%    Weight: (!) 335 lb (152 kg)    Height: 5' 10\" (1.778 m)            MDM      REASSESSMENT          CRITICAL CARE TIME     CONSULTS:  None      PROCEDURES:     Procedures    MEDICATIONS GIVEN THIS VISIT:  Medications - No data to display     FINAL IMPRESSION      1.  Sciatica of right side DISPOSITION/PLAN   DISPOSITION Decision To Discharge 06/09/2022 12:03:09 PM      PATIENT REFERRED TO:  Your pain management physician    Schedule an appointment as soon as possible for a visit         DISCHARGE MEDICATIONS:  New Prescriptions    LIDOCAINE (LIDODERM) 5 %    Place 1 patch onto the skin every 24 hours for 15 doses LEAVE ON 12 HOURS AT A TIME THEN OFF FOR 12 HOURS    METHOCARBAMOL (ROBAXIN) 500 MG TABLET    Take 1 tablet by mouth 4 times daily for 20 doses    ONDANSETRON (ZOFRAN) 8 MG TABLET    Take 1 tablet by mouth every 8 hours as needed for Nausea    OXYCODONE-ACETAMINOPHEN (PERCOCET) 5-325 MG PER TABLET    Take 1 tablet by mouth every 6 hours as needed for Pain for up to 10 doses. PREDNISONE (DELTASONE) 10 MG TABLET    Take 4 tablets by mouth daily for 3 days, THEN 3 tablets daily for 3 days, THEN 2 tablets daily for 3 days, THEN 1 tablet daily for 3 days. Take by oral route 4 tabs x 3 days, then 3 tabs x 3 days, then 2 tabs x 3 days, then 1 tab x 3 days, then discontinue. Take with food. .       Controlled Substances Monitoring  RX Monitoring 4/7/2021   Periodic Controlled Substance Monitoring No signs of potential drug abuse or diversion identified. (Please note that portions of this note were completed with a voice recognition program.  Efforts were made to edit the dictations but occasionally words are mis-transcribed.)    Patient was advised to return to the Emergency Department if there was any worsening.     Maricel Huerta MD (electronically signed)  Attending Emergency Physician          Tiffany Crow MD  06/09/22 4711

## 2022-06-16 DIAGNOSIS — I10 ESSENTIAL HYPERTENSION: ICD-10-CM

## 2022-06-16 RX ORDER — BENAZEPRIL HYDROCHLORIDE 40 MG/1
TABLET, FILM COATED ORAL
Qty: 90 TABLET | Refills: 1 | Status: SHIPPED | OUTPATIENT
Start: 2022-06-16

## 2022-06-21 RX ORDER — AMITRIPTYLINE HYDROCHLORIDE 50 MG/1
TABLET, FILM COATED ORAL
Qty: 30 TABLET | Refills: 2 | Status: SHIPPED | OUTPATIENT
Start: 2022-06-21 | End: 2022-09-26 | Stop reason: SDUPTHER

## 2022-06-25 DIAGNOSIS — I10 ESSENTIAL HYPERTENSION: ICD-10-CM

## 2022-06-27 RX ORDER — AMLODIPINE BESYLATE 5 MG/1
TABLET ORAL
Qty: 30 TABLET | Refills: 5 | Status: SHIPPED | OUTPATIENT
Start: 2022-06-27

## 2022-07-26 DIAGNOSIS — I10 ESSENTIAL HYPERTENSION: ICD-10-CM

## 2022-07-26 RX ORDER — METOPROLOL SUCCINATE 50 MG/1
TABLET, EXTENDED RELEASE ORAL
Qty: 90 TABLET | Refills: 1 | Status: SHIPPED | OUTPATIENT
Start: 2022-07-26

## 2022-08-02 DIAGNOSIS — E03.9 ACQUIRED HYPOTHYROIDISM: ICD-10-CM

## 2022-08-02 RX ORDER — LEVOTHYROXINE SODIUM 0.07 MG/1
TABLET ORAL
Qty: 90 TABLET | Refills: 0 | Status: SHIPPED | OUTPATIENT
Start: 2022-08-02 | End: 2022-11-03 | Stop reason: SDUPTHER

## 2022-09-15 ENCOUNTER — TELEPHONE (OUTPATIENT)
Dept: FAMILY MEDICINE CLINIC | Age: 62
End: 2022-09-15

## 2022-09-15 DIAGNOSIS — M51.36 DDD (DEGENERATIVE DISC DISEASE), LUMBAR: Primary | ICD-10-CM

## 2022-09-15 DIAGNOSIS — G89.29 CHRONIC BILATERAL LOW BACK PAIN WITH LEFT-SIDED SCIATICA: ICD-10-CM

## 2022-09-15 DIAGNOSIS — M54.42 CHRONIC BILATERAL LOW BACK PAIN WITH LEFT-SIDED SCIATICA: ICD-10-CM

## 2022-09-15 NOTE — TELEPHONE ENCOUNTER
Called pt and left a message letting him know that we faxed over the referral   Printed the referral and placed upfront to be faxed   Thank you

## 2022-09-15 NOTE — TELEPHONE ENCOUNTER
Patient is calling in to get a referral to [address] [place] [address].      Fax [phone] Attn: Neural Surgery     [facility] told him he needs a referral to [facility] from Dr. Esther Mcghee    Please Advise

## 2022-09-26 DIAGNOSIS — I10 ESSENTIAL HYPERTENSION: ICD-10-CM

## 2022-09-26 RX ORDER — AMITRIPTYLINE HYDROCHLORIDE 50 MG/1
TABLET, FILM COATED ORAL
Qty: 30 TABLET | Refills: 2 | Status: SHIPPED | OUTPATIENT
Start: 2022-09-26

## 2022-11-03 DIAGNOSIS — E03.9 ACQUIRED HYPOTHYROIDISM: ICD-10-CM

## 2022-11-03 RX ORDER — LEVOTHYROXINE SODIUM 0.07 MG/1
TABLET ORAL
Qty: 90 TABLET | Refills: 2 | Status: SHIPPED | OUTPATIENT
Start: 2022-11-03

## 2022-11-03 NOTE — TELEPHONE ENCOUNTER
Pharm Confirmed- [facility] in [address] [place] [address] spoke with alvaoger to refill last week ans was told they did not hear back from PCP.

## 2022-11-05 DIAGNOSIS — Z23 NEED FOR PNEUMOCOCCAL VACCINATION: ICD-10-CM

## 2022-11-07 RX ORDER — CELECOXIB 200 MG/1
CAPSULE ORAL
Qty: 30 CAPSULE | Refills: 5 | Status: SHIPPED | OUTPATIENT
Start: 2022-11-07

## 2022-11-28 ENCOUNTER — TELEPHONE (OUTPATIENT)
Dept: FAMILY MEDICINE CLINIC | Age: 62
End: 2022-11-28

## 2022-11-28 DIAGNOSIS — G47.33 OBSTRUCTIVE SLEEP APNEA SYNDROME: Primary | ICD-10-CM

## 2022-11-28 NOTE — TELEPHONE ENCOUNTER
Patient called in stating that he has sleep apnea and his machine broke. His work got him his machine about 15 years ago. He would like to know where to go to get a new machine?     Please Advise

## 2022-11-29 ENCOUNTER — OFFICE VISIT (OUTPATIENT)
Dept: SLEEP MEDICINE | Age: 62
End: 2022-11-29
Payer: MEDICAID

## 2022-11-29 VITALS
WEIGHT: 315 LBS | BODY MASS INDEX: 44.1 KG/M2 | RESPIRATION RATE: 24 BRPM | OXYGEN SATURATION: 97 % | SYSTOLIC BLOOD PRESSURE: 150 MMHG | HEART RATE: 75 BPM | DIASTOLIC BLOOD PRESSURE: 90 MMHG | TEMPERATURE: 98 F | HEIGHT: 71 IN

## 2022-11-29 DIAGNOSIS — G47.19 EXCESSIVE DAYTIME SLEEPINESS: ICD-10-CM

## 2022-11-29 DIAGNOSIS — R06.81 WITNESSED EPISODE OF APNEA: ICD-10-CM

## 2022-11-29 DIAGNOSIS — G47.33 OSA (OBSTRUCTIVE SLEEP APNEA): Primary | ICD-10-CM

## 2022-11-29 DIAGNOSIS — R06.89 GASPING FOR BREATH: ICD-10-CM

## 2022-11-29 DIAGNOSIS — E66.01 CLASS 3 SEVERE OBESITY DUE TO EXCESS CALORIES WITH SERIOUS COMORBIDITY AND BODY MASS INDEX (BMI) OF 45.0 TO 49.9 IN ADULT (HCC): ICD-10-CM

## 2022-11-29 DIAGNOSIS — I10 ESSENTIAL HYPERTENSION: ICD-10-CM

## 2022-11-29 DIAGNOSIS — R06.83 SNORING: ICD-10-CM

## 2022-11-29 PROCEDURE — 99204 OFFICE O/P NEW MOD 45 MIN: CPT | Performed by: PSYCHIATRY & NEUROLOGY

## 2022-11-29 PROCEDURE — G8427 DOCREV CUR MEDS BY ELIG CLIN: HCPCS | Performed by: PSYCHIATRY & NEUROLOGY

## 2022-11-29 PROCEDURE — G8417 CALC BMI ABV UP PARAM F/U: HCPCS | Performed by: PSYCHIATRY & NEUROLOGY

## 2022-11-29 PROCEDURE — G8483 FLU IMM NO ADMIN DOC REA: HCPCS | Performed by: PSYCHIATRY & NEUROLOGY

## 2022-11-29 PROCEDURE — 3074F SYST BP LT 130 MM HG: CPT | Performed by: PSYCHIATRY & NEUROLOGY

## 2022-11-29 PROCEDURE — 1036F TOBACCO NON-USER: CPT | Performed by: PSYCHIATRY & NEUROLOGY

## 2022-11-29 PROCEDURE — 3078F DIAST BP <80 MM HG: CPT | Performed by: PSYCHIATRY & NEUROLOGY

## 2022-11-29 PROCEDURE — 3017F COLORECTAL CA SCREEN DOC REV: CPT | Performed by: PSYCHIATRY & NEUROLOGY

## 2022-11-29 ASSESSMENT — SLEEP AND FATIGUE QUESTIONNAIRES
HOW LIKELY ARE YOU TO NOD OFF OR FALL ASLEEP WHILE SITTING INACTIVE IN A PUBLIC PLACE: 3
HOW LIKELY ARE YOU TO NOD OFF OR FALL ASLEEP WHILE SITTING AND TALKING TO SOMEONE: 0
HOW LIKELY ARE YOU TO NOD OFF OR FALL ASLEEP WHILE SITTING AND READING: 0
HOW LIKELY ARE YOU TO NOD OFF OR FALL ASLEEP WHILE LYING DOWN TO REST IN THE AFTERNOON WHEN CIRCUMSTANCES PERMIT: 3
HOW LIKELY ARE YOU TO NOD OFF OR FALL ASLEEP WHILE SITTING QUIETLY AFTER LUNCH WITHOUT ALCOHOL: 0
ESS TOTAL SCORE: 12
NECK CIRCUMFERENCE (INCHES): 20
HOW LIKELY ARE YOU TO NOD OFF OR FALL ASLEEP IN A CAR, WHILE STOPPED FOR A FEW MINUTES IN TRAFFIC: 0
HOW LIKELY ARE YOU TO NOD OFF OR FALL ASLEEP WHILE WATCHING TV: 3
HOW LIKELY ARE YOU TO NOD OFF OR FALL ASLEEP WHEN YOU ARE A PASSENGER IN A CAR FOR AN HOUR WITHOUT A BREAK: 3

## 2022-11-29 ASSESSMENT — ENCOUNTER SYMPTOMS
APNEA: 1
GASTROINTESTINAL NEGATIVE: 1
WHEEZING: 1
CHOKING: 1
EYES NEGATIVE: 1
SORE THROAT: 1
COUGH: 1
STRIDOR: 1
BACK PAIN: 1

## 2022-11-29 NOTE — PATIENT INSTRUCTIONS
Orders Placed This Encounter   Procedures    Sleep Study with PAP Titration     Standing Status:   Future     Standing Expiration Date:   11/29/2023     Order Specific Question:   Sleep Study Titration Type     Answer:   Split Night Study (Baseline followed by PAP Titration)     Order Specific Question:   Location For Sleep Study     Answer:   [place]     Order Specific Question:   Select Sleep Lab Location     Answer:   [facility]

## 2022-11-29 NOTE — PROGRESS NOTES
MD RISHI Davis Board Certified in Sleep Medicine  Certified [facility] Sleep Medicine  Board Certified in Neurology [address] [facility] [address] [place], [place] [id number] [place] [address]  [address] [facility], [address] SLEEP MEDICINE [place] [address]  [phone]    Subjective:     Patient ID: Ninoska Avila is a 57 y.o. male. Chief Complaint   Patient presents with    [facility]    Sleep Apnea       HPI:        Ninoska Avila is a 57 y.o. male referred by Milana Cervantes for a sleep evaluation. He complains of snoring, snorting, choking, periods of not breathing, tossing and turning, kicking, excessive daytime sleepiness, feels sleepy during the day, take naps during the day but he denies knees buckling with laughing, completely or partially paralyzed while falling asleep or waking up, noisy environment, uncomfortable room temperature, uncomfortable bedding. Symptoms began several years ago, gradually worsening since that time. The patient's bed-partner confirmed the snoring and stopped breathing at night. SLEEP SCHEDULE: Goes to bed around 11 PM in the weekdays and 11 PM in the weekends. It usually takes the patient 30-60 minutes to fall asleep. The patient gets up 3-4 per night to go to the bathroom. The Patient finally gets up at 7 AM during the weekdays and 7 AM in the weekends. patient wakes up with dry mouth and sometimes morning headache. . the headache usually dull headache lasts 30-60 minutes. The patient has restless sleep with frequent arousals in addition to the Patient has significant daytime sleepiness.  The Patient scored Washington Sleepiness Score: 12 on Washington Sleepiness Scale ( more than 10 is indicative of daytime sleepiness)and 57 in fatigue scale ( more than 36 is indicative of daytime fatigue). The patient takes 2 naps/day for 20 minutes and usually is not refreshing nap. Previous evaluation and treatment has included- PSG. his machine quit working. 14 ears ago. The Patient has been obese for many years and tried, has not gained weight in the last 5 years,  unsuccessfully to lose weight through diet, exercise. DOT/CDL - N/A  FAA/'slicense - N/A  The patient HTN is stable. Previous Report(s) Reviewed: historical medical records       Social History     Socioeconomic History    Marital status:      Spouse name: Not on file    Number of children: Not on file    Years of education: Not on file    Highest education level: Not on file   Occupational History    Occupation:    Tobacco Use    Smoking status: Never     Passive exposure: Past    Smokeless tobacco: Never    Tobacco comments:     Advised to not start   Vaping Use    Vaping Use: Never used   Substance and Sexual Activity    Alcohol use: Not Currently     Alcohol/week: 0.0 standard drinks     Comment: 3 beers/9 weeks    Drug use: No    Sexual activity: Not on file   Other Topics Concern    Not on file   Social History Narrative    /deliveries    Lives alone. Exercise: physical job loading trucks     Seatbelt use: Always    Living will: no,   additional information provided             Social Determinants of Health     Financial Resource Strain: Not on file   Food Insecurity: Not on file   Transportation Needs: Not on file   Physical Activity: Not on file   Stress: Not on file   Social Connections: Not on file   Intimate Partner Violence: Not on file   Housing Stability: Not on file       Prior to Admission medications    Medication Sig Start Date End Date Taking?  Authorizing Provider   celecoxib (CELEBREX) 200 MG capsule TAKE ONE CAPSULE BY MOUTH DAILY 11/7/22  Yes Camelia Ceballos MD   levothyroxine (SYNTHROID) 75 MCG tablet TAKE ONE TABLET BY MOUTH DAILY 11/3/22  Yes Camelia Ceballos MD amitriptyline (ELAVIL) 50 MG tablet TAKE ONE TABLET BY MOUTH ONCE NIGHTLY 9/26/22  Yes Brian August MD   metoprolol succinate (TOPROL XL) 50 MG extended release tablet TAKE ONE TABLET BY MOUTH DAILY 7/26/22  Yes KAMILLE Headley - CNP   amLODIPine (NORVASC) 5 MG tablet TAKE ONE TABLET BY MOUTH DAILY 6/27/22  Yes Brian August MD   benazepril (LOTENSIN) 40 MG tablet TAKE ONE TABLET BY MOUTH DAILY 6/16/22  Yes Brian August MD   ondansetron (ZOFRAN) 8 MG tablet Take 1 tablet by mouth every 8 hours as needed for Nausea 6/9/22  Yes Bhavik De Souza MD   pravastatin (PRAVACHOL) 20 MG tablet TAKE ONE TABLET BY MOUTH ONCE NIGHTLY 6/8/22  Yes Brian August MD   metFORMIN (GLUCOPHAGE) 1000 MG tablet TAKE TWO TABLETS BY MOUTH DAILY WITH BREAKFAST 5/23/22  Yes Brian August MD   methocarbamol (ROBAXIN) 500 MG tablet  2/7/22  Yes Historical Provider, MD   prochlorperazine (COMPAZINE) 5 MG tablet Take 1 tablet by mouth every 6 hours as needed for Nausea (headache) 3/3/22  Yes Brian August MD   gabapentin (NEURONTIN) 600 MG tablet Take 600 mg by mouth 3 times daily. Yes Madison Cohen MD   omeprazole (PRILOSEC) 20 MG delayed release capsule Take 1 capsule by mouth every morning (before breakfast) 4/7/21  Yes Christi Dueñas MD   ibuprofen (ADVIL;MOTRIN) 800 MG tablet Take 0.5 tablets by mouth every 8 hours as needed for Pain 3/17/21  Yes Chrissy Encinas APRN - NP   aspirin 81 MG tablet Take 81 mg by mouth daily. Yes Historical Provider, MD   multivitamin [facility]) per tablet Take 1 tablet by mouth daily.      Yes Historical Provider, MD       Allergies as of 11/29/2022 - Fully Reviewed 06/09/2022   Allergen Reaction Noted    Influenza vaccines Hives 11/17/2017    Other Hives 04/27/2022    Codeine Nausea And Vomiting and Rash 12/28/2010       Patient Active Problem List   Diagnosis    Plantar fasciitis    Lumbosacral radiculopathy-L5 by EMG, Epidural spring 2011    Spinal stenosis    Essential hypertension Metabolic syndrome X    Hyperlipidemia with target LDL less than 100    Seborrheic dermatitis    Mood swings    Insomnia    Obesity    Foot drop    Tinnitus    History of nephrolithiasis    Allergic rhinitis    Needs flu shot    Chronic headache    Obstructive sleep apnea syndrome    Outbursts of anger    Primary insomnia    History of influenza vaccine allergy    Ureteral stone    Controlled type 2 diabetes mellitus without complication, without long-term current use of insulin ([facility] [address]    Acquired hypothyroidism    DDD (degenerative disc disease), lumbar       Past Medical History:   Diagnosis Date    Acquired hypothyroidism 06/14/2021    Arthritis     High blood pressure     History of influenza vaccine allergy 11/17/2017    HTN (hypertension)     Hyperlipidemia LDL goal < 100     Insomnia     Lumbosacral radiculopathy-L5 by EMG     epidural spring 6021    Metabolic syndrome X     Nephrolithiasis 07/03/2013    Obesity     Pneumonia 2008    Sleep apnea     Tinnitus 12/20/2012       Past Surgical History:   Procedure Laterality Date    CHOLECYSTECTOMY  03/2005    lap    LITHOTRIPSY Left 3/16/2021    CYSTOSCOPY RETROGRADE PYELOGRAM, LEFT URETEROSCOPY, LEFT  LASER LITHOTRIPSY, STENT INSERTION performed by Boston Berman MD at [facility] OR       Family History   Problem Relation Age of Onset    Alcohol Abuse Mother     Diabetes Father     Heart Disease Father     Stroke Father     Kidney Disease Other     Diabetes Sister     Stroke Sister     Coronary Art Dis Sister        Review of Systems   Constitutional:  Positive for diaphoresis and fatigue. HENT:  Positive for congestion and sore throat. Eyes: Negative. Respiratory:  Positive for apnea, cough, choking, wheezing and stridor. Cardiovascular:  Positive for leg swelling. Gastrointestinal: Negative. Genitourinary:  Positive for frequency. Musculoskeletal:  Positive for arthralgias, back pain and myalgias. Skin: Negative.     Neurological:  Positive for headaches. Psychiatric/Behavioral:  The patient is nervous/anxious. Objective:     Vitals:  Weight BMI Neck circumference    Wt Readings from Last 3 Encounters:   11/29/22 (!) 332 lb 9.6 oz (150.9 kg)   06/09/22 (!) 335 lb (152 kg)   05/25/22 (!) 331 lb (150.1 kg)    Body mass index is 47.05 kg/m². Neck circumference (Inches): 20     BP HR SaO2   BP Readings from Last 3 Encounters:   11/29/22 (!) 150/90   06/09/22 138/83   05/25/22 (!) 145/79    Pulse Readings from Last 3 Encounters:   11/29/22 75   06/09/22 82   05/25/22 80    SpO2 Readings from Last 3 Encounters:   11/29/22 97%   06/09/22 93%   05/25/22 98%        The mandibular molar Class :   1 []2 []3      Mallampati I Airway Classification:   []1 [x]2 []3 []4        Physical Exam  Vitals and nursing note reviewed. Constitutional:       Appearance: He is obese. HENT:      Head: Atraumatic. Nose: Nose normal.      Mouth/Throat:      Comments: Mallampati class 2, no retrognathia or hypognathia , normal airflow in bilateral nostrils, no septum deviation , crowded oropharynx, no tonsils enlargement. Cardiovascular:      Rate and Rhythm: Normal rate and regular rhythm. Pulmonary:      Effort: Pulmonary effort is normal.      Breath sounds: Normal breath sounds. Musculoskeletal:      Right lower leg: Edema (1+) present. Left lower leg: Edema (1+) present. Neurological:      Mental Status: Mental status is at baseline. Assessment:    Obstructive sleep apnea especially with snoring, snorting,  observed apnea, daytime sleepiness, large neck circumference, Mallampati class of 2 and obesity. Diagnosis Orders   1. KLEVER (obstructive sleep apnea)  Sleep Study with PAP Titration      2. Essential hypertension        3. Class 3 severe obesity due to excess calories with serious comorbidity and body mass index (BMI) of 45.0 to 49.9 in adult [facility])  Ambulatory Referral to Bariatric Surgery      4. Snoring        5.  Witnessed episode of apnea        6. Gasping for breath        7. Excessive daytime sleepiness          Plan:     Patient was counseled about the pathophysiology of obstructive sleep apnea syndrome and the methods for evaluating its presence and severity. Patient was counseled to avoid driving and other potentially hazardous circumstances if the patient is experiencing excessive sleepiness. Treatment considerations include the use of nasal CPAP, oral dental appliance or a surgical intervention, which should be based on otolarygologic findings, In the meantime, the patient should be cautioned to avoid the use of alcohol or other depressant medications because of potential for increasing the duration and severity of apnea and cautioned regarding driving or operating and dangerous equipment if the patient is experiencing daytime sleepiness. .  Most likely treating the KLEVER will have positive impact on HTN control. Weight loss: The proportionality between weight and AHI. With 10% weight change, the AHI has a 27% proportionate change. With 20% weight change, the AHI has a 45-50% proportionate change. The Patient accepts referral to bariatrics for further consideration of weight loss methods. Orders Placed This Encounter   Procedures    Ambulatory Referral to Bariatric Surgery    Sleep Study with PAP Titration         Return for F/U between 31 and 90 days from the recieving CPAP.     Hanna Bowman MD  Medical Director - [facility]

## 2022-12-03 ENCOUNTER — HOSPITAL ENCOUNTER (OUTPATIENT)
Dept: SLEEP CENTER | Age: 62
Discharge: HOME | End: 2022-12-03

## 2022-12-03 DIAGNOSIS — G47.33 OSA (OBSTRUCTIVE SLEEP APNEA): ICD-10-CM

## 2022-12-05 NOTE — PROGRESS NOTES
Rishi Chow         : 1960  [] [address]     [] [address]      [] [facility]     [facility]    [] [facility]  [] [facility]  [x] [facility]   [] [facility] services [] Other:  Diagnosis: [x] KLEVER (G47.33) [] CSA (G47.31) [] Apnea (G47.30)   Length of Need: [] 12 Months [x] 99 Months [] Other:    Machine (TARUN!):  [x] ResMed AirSense     Auto [] Other:     [x]  CPAP () [] Bilevel ()   Mode: [x] Auto [] Spontaneous    Mode: [] Auto [] Spontaneous           15 cm                 Comfort Settings:   - Ramp Pressure: 5 cmH2O                                        - Ramp time: 15 min                                     -  Flex/EPR - 3 full time                                    - For ResMed Bilevel (TiMax-4 sec   TiMin- 0.2 sec)     Humidifier: [x] Heated ()        [x] Water chamber replacement ()/ 1 per 6 months        Mask:  Please always start with the mask the patient used during the titraion   [] Nasal () /1 per 3 months [x] Full Face () /1 per 3 months   [] Patient choice -Size and fit mask [x] Patient Choice - Size and fit mask   [] Dispense:  [] Dispense:   medium Vitera    [] Headgear () / 1 per 3 months [x] Headgear () / 1 per 3 months   [] Replacement Nasal Cushion ()/2 per month [x] Interface Replacement ()/1 per month   [] Replacement Nasal Pillows ()/2 per month         Tubing: [x] Heated ()/1 per 3 months    [] Standard ()/1 per 3 months [] Other:           Filters: [x] Non-disposable ()/1 per 6 months     [x] Ultra-Fine, Disposable ()/2 per month        Miscellaneous: [x] Chin Strap ()/ 1 per 6 months [] O2 bleed-in:       LPM   [] Oximetry on CPAP/Bilevel []  Other:          Start Order Date: 22    MEDICAL JUSTIFICATION:  I, the undersigned, certify that the above prescribed supplies are medically necessary for this patients wellbeing.   In my opinion, the supplies are both reasonable and necessary in reference to accepted standards of medicalpractice in treatment of this patients condition.     Krystal Thakur MD      NPI: [id number]       Order Signed Date: 12/05/22    Electronically signed by Krystal Thakur MD on 12/5/2022 at 1:33 PM

## 2022-12-06 ENCOUNTER — TELEPHONE (OUTPATIENT)
Dept: PULMONOLOGY | Age: 62
End: 2022-12-06

## 2022-12-06 NOTE — TELEPHONE ENCOUNTER
Sleep study showed moderate KLEVER. AHI was 27.8  per hr. And O2 Desaturations to 80%.  Becausee of this CPAP was initiated  Dr Karina Coto CPAP therapy of 15 cm is suggested    Pt notified of message and wants orders to go to [address]    Orders faxed

## 2022-12-11 DIAGNOSIS — E78.5 HYPERLIPIDEMIA WITH TARGET LDL LESS THAN 100: ICD-10-CM

## 2022-12-11 DIAGNOSIS — E11.9 CONTROLLED TYPE 2 DIABETES MELLITUS WITHOUT COMPLICATION, WITHOUT LONG-TERM CURRENT USE OF INSULIN (HCC): ICD-10-CM

## 2022-12-12 RX ORDER — PRAVASTATIN SODIUM 20 MG
TABLET ORAL
Qty: 90 TABLET | Refills: 1 | Status: SHIPPED | OUTPATIENT
Start: 2022-12-12

## 2022-12-17 DIAGNOSIS — I10 ESSENTIAL HYPERTENSION: ICD-10-CM

## 2022-12-19 RX ORDER — BENAZEPRIL HYDROCHLORIDE 40 MG/1
TABLET, FILM COATED ORAL
Qty: 90 TABLET | Refills: 1 | Status: SHIPPED | OUTPATIENT
Start: 2022-12-19

## 2022-12-19 NOTE — TELEPHONE ENCOUNTER
[facility] is not in network with pt insurance.   Notified him and he will go with [facility]

## 2022-12-26 DIAGNOSIS — I10 ESSENTIAL HYPERTENSION: ICD-10-CM

## 2022-12-27 RX ORDER — AMLODIPINE BESYLATE 5 MG/1
TABLET ORAL
Qty: 30 TABLET | Refills: 0 | Status: SHIPPED | OUTPATIENT
Start: 2022-12-27

## 2022-12-29 ENCOUNTER — TELEPHONE (OUTPATIENT)
Dept: FAMILY MEDICINE CLINIC | Age: 62
End: 2022-12-29

## 2022-12-29 RX ORDER — AMITRIPTYLINE HYDROCHLORIDE 50 MG/1
TABLET, FILM COATED ORAL
Qty: 30 TABLET | Refills: 2 | Status: SHIPPED | OUTPATIENT
Start: 2022-12-29

## 2022-12-29 NOTE — TELEPHONE ENCOUNTER
Patient called wanting to know if Dr. Elodia Vasquez received a form from [employer] [facility] for her to sign as he is getting an injection on 1/6/23    He said it was sent over this morning    Please Advise

## 2022-12-29 NOTE — TELEPHONE ENCOUNTER
[facility] faxed a paper (scanned into [employer]) for you to okay him to Hold his ASA 81 mg for 5 days prior to having Transforaminal Epidural Steroid injection on 1/6/23. Also, hold Fish oil, but I don't see that he takes that. Told them you have not seen him since 5/4/22, so not sure about doing this. They said if he can't do it on 1/6/23, it will have to be changed to end of 1150 On license of [facility] [address] Katie Sal he is In a lot of shoulder pain. They said they can take a verbal order since you won't be in till Wed next week, if okay to hold the ASA 81 mg for 5 days prior to procedure.

## 2023-01-17 DIAGNOSIS — I10 ESSENTIAL HYPERTENSION: ICD-10-CM

## 2023-01-17 RX ORDER — METOPROLOL SUCCINATE 50 MG/1
TABLET, EXTENDED RELEASE ORAL
Qty: 90 TABLET | Refills: 1 | Status: SHIPPED | OUTPATIENT
Start: 2023-01-17

## 2023-01-17 RX ORDER — AMLODIPINE BESYLATE 5 MG/1
TABLET ORAL
Qty: 30 TABLET | Refills: 5 | Status: SHIPPED | OUTPATIENT
Start: 2023-01-17

## 2023-03-29 RX ORDER — AMITRIPTYLINE HYDROCHLORIDE 50 MG/1
TABLET, FILM COATED ORAL
Qty: 30 TABLET | Refills: 2 | OUTPATIENT
Start: 2023-03-29

## 2023-05-03 ENCOUNTER — TELEPHONE (OUTPATIENT)
Dept: FAMILY MEDICINE CLINIC | Age: 63
End: 2023-05-03

## 2023-05-03 DIAGNOSIS — E11.9 CONTROLLED TYPE 2 DIABETES MELLITUS WITHOUT COMPLICATION, WITHOUT LONG-TERM CURRENT USE OF INSULIN (HCC): ICD-10-CM

## (undated) DEVICE — CAMERA STRYKER 1488 HD GEN

## (undated) DEVICE — GOWN,SIRUS,FABRNF,XL,20/CS: Brand: MEDLINE

## (undated) DEVICE — MEDIA CONTRAST ISOVUE  300 10X50ML

## (undated) DEVICE — READY WET SKIN SCRUB TRAY-LF: Brand: MEDLINE INDUSTRIES, INC.

## (undated) DEVICE — GLOVE SURG SIGNATURE LTX ESS LTX PF 7.5

## (undated) DEVICE — CYSTO PACK: Brand: MEDLINE INDUSTRIES, INC.

## (undated) DEVICE — SHEET PT TRANSFER 80X40 IN LAT AIR NYL GRY COMFORT GLIDE

## (undated) DEVICE — KIT BEDSIDE REVITAL OX 500ML

## (undated) DEVICE — NITINOL STONE RETRIEVAL BASKET: Brand: ZERO TIP

## (undated) DEVICE — LASER SURG FIBER MASTERPULSE

## (undated) DEVICE — BAG DRNGE COMB PK

## (undated) DEVICE — BAG DRAINAGE CONTAINER 15 LT FLUID COLLCTN

## (undated) DEVICE — Z INACTIVE USE 2660664 SOLUTION IRRIG 3000ML 0.9% SOD CHL USP UROMATIC PLAS CONT

## (undated) DEVICE — SEAL ENDOSCP INSTR 7FR BX SELF SEAL

## (undated) DEVICE — GUIDEWIRE ENDOSCP L150CM DIA0.035IN TIP 3CM PTFE NIT

## (undated) DEVICE — SOLUTION IV IRRIG WATER 1000ML POUR BRL 2F7114